# Patient Record
Sex: FEMALE | Race: BLACK OR AFRICAN AMERICAN | NOT HISPANIC OR LATINO | Employment: FULL TIME | URBAN - METROPOLITAN AREA
[De-identification: names, ages, dates, MRNs, and addresses within clinical notes are randomized per-mention and may not be internally consistent; named-entity substitution may affect disease eponyms.]

---

## 2023-04-25 DIAGNOSIS — I10 HYPERTENSION, UNSPECIFIED TYPE: Primary | ICD-10-CM

## 2023-04-26 RX ORDER — LOSARTAN POTASSIUM AND HYDROCHLOROTHIAZIDE 12.5; 5 MG/1; MG/1
TABLET ORAL
Qty: 90 TABLET | Refills: 1 | Status: SHIPPED | OUTPATIENT
Start: 2023-04-26 | End: 2023-07-07 | Stop reason: ALTCHOICE

## 2023-07-07 ENCOUNTER — OFFICE VISIT (OUTPATIENT)
Dept: PRIMARY CARE | Facility: CLINIC | Age: 53
End: 2023-07-07
Payer: COMMERCIAL

## 2023-07-07 VITALS
HEIGHT: 62 IN | DIASTOLIC BLOOD PRESSURE: 84 MMHG | BODY MASS INDEX: 25.24 KG/M2 | HEART RATE: 84 BPM | OXYGEN SATURATION: 95 % | TEMPERATURE: 97.1 F | SYSTOLIC BLOOD PRESSURE: 128 MMHG

## 2023-07-07 DIAGNOSIS — F10.20 ALCOHOLISM (MULTI): Primary | ICD-10-CM

## 2023-07-07 DIAGNOSIS — F41.9 ANXIETY AND DEPRESSION: ICD-10-CM

## 2023-07-07 DIAGNOSIS — Z09 HOSPITAL DISCHARGE FOLLOW-UP: ICD-10-CM

## 2023-07-07 DIAGNOSIS — F32.A ANXIETY AND DEPRESSION: ICD-10-CM

## 2023-07-07 PROBLEM — Z98.51 HISTORY OF BILATERAL TUBAL LIGATION: Status: ACTIVE | Noted: 2018-01-03

## 2023-07-07 PROBLEM — I49.3 PVC'S (PREMATURE VENTRICULAR CONTRACTIONS): Status: ACTIVE | Noted: 2023-07-07

## 2023-07-07 PROBLEM — A59.9 TRICHOMONAS VAGINALIS INFECTION: Status: ACTIVE | Noted: 2023-07-07

## 2023-07-07 PROBLEM — F10.10 ALCOHOL ABUSE: Status: ACTIVE | Noted: 2018-01-03

## 2023-07-07 PROBLEM — R55 SYNCOPE: Status: ACTIVE | Noted: 2023-07-07

## 2023-07-07 PROBLEM — L57.4 LAXITY OF SKIN: Status: ACTIVE | Noted: 2022-05-05

## 2023-07-07 PROBLEM — H93.11 TINNITUS, RIGHT EAR: Status: ACTIVE | Noted: 2023-07-07

## 2023-07-07 PROBLEM — N93.9 ABNORMAL UTERINE BLEEDING (AUB): Status: ACTIVE | Noted: 2023-07-07

## 2023-07-07 PROBLEM — E66.9 OBESITY: Status: ACTIVE | Noted: 2023-07-07

## 2023-07-07 PROBLEM — F17.200 SMOKING: Status: ACTIVE | Noted: 2023-07-07

## 2023-07-07 PROBLEM — H90.6 MIXED CONDUCTIVE AND SENSORINEURAL HEARING LOSS OF BOTH EARS: Status: ACTIVE | Noted: 2023-07-07

## 2023-07-07 PROBLEM — R00.0 TACHYCARDIA: Status: ACTIVE | Noted: 2023-07-07

## 2023-07-07 PROBLEM — I47.10 PAROXYSMAL SVT (SUPRAVENTRICULAR TACHYCARDIA) (CMS-HCC): Status: ACTIVE | Noted: 2023-07-07

## 2023-07-07 PROBLEM — S89.91XA INJURY OF RIGHT KNEE: Status: ACTIVE | Noted: 2023-07-07

## 2023-07-07 PROBLEM — R19.8 LAXITY OF ABDOMINAL WALL: Status: ACTIVE | Noted: 2022-05-05

## 2023-07-07 PROBLEM — S82.831A: Status: ACTIVE | Noted: 2023-07-07

## 2023-07-07 PROBLEM — R42 DIZZINESS: Status: ACTIVE | Noted: 2023-07-07

## 2023-07-07 PROBLEM — L98.7 EXCESS SKIN OF ABDOMEN: Status: ACTIVE | Noted: 2022-05-04

## 2023-07-07 PROBLEM — T88.7XXA MEDICATION SIDE EFFECT: Status: ACTIVE | Noted: 2023-07-07

## 2023-07-07 PROBLEM — H91.90 HEARING LOSS: Status: ACTIVE | Noted: 2023-07-07

## 2023-07-07 PROBLEM — I10 BENIGN ESSENTIAL HYPERTENSION: Status: ACTIVE | Noted: 2018-01-03

## 2023-07-07 PROCEDURE — 3079F DIAST BP 80-89 MM HG: CPT | Performed by: INTERNAL MEDICINE

## 2023-07-07 PROCEDURE — 1036F TOBACCO NON-USER: CPT | Performed by: INTERNAL MEDICINE

## 2023-07-07 PROCEDURE — 3074F SYST BP LT 130 MM HG: CPT | Performed by: INTERNAL MEDICINE

## 2023-07-07 PROCEDURE — 99215 OFFICE O/P EST HI 40 MIN: CPT | Performed by: INTERNAL MEDICINE

## 2023-07-07 RX ORDER — QUETIAPINE FUMARATE 25 MG/1
TABLET, FILM COATED ORAL
COMMUNITY
Start: 2023-06-24 | End: 2024-03-18 | Stop reason: ALTCHOICE

## 2023-07-07 RX ORDER — LOSARTAN POTASSIUM 50 MG/1
TABLET ORAL
COMMUNITY
Start: 2023-07-01

## 2023-07-07 RX ORDER — SERTRALINE HYDROCHLORIDE 50 MG/1
TABLET, FILM COATED ORAL
COMMUNITY
Start: 2023-06-28 | End: 2024-03-18 | Stop reason: ALTCHOICE

## 2023-07-07 RX ORDER — FOLIC ACID 1 MG/1
TABLET ORAL
COMMUNITY
Start: 2023-07-01

## 2023-07-07 RX ORDER — METOPROLOL SUCCINATE 100 MG/1
1 TABLET, EXTENDED RELEASE ORAL DAILY
COMMUNITY
Start: 2020-08-28 | End: 2023-12-29

## 2023-07-07 RX ORDER — HYDROCHLOROTHIAZIDE 12.5 MG/1
TABLET ORAL
COMMUNITY
Start: 2023-06-28

## 2023-07-07 RX ORDER — TRAZODONE HYDROCHLORIDE 50 MG/1
TABLET ORAL
COMMUNITY
Start: 2023-07-05

## 2023-07-07 RX ORDER — HYDROXYZINE HYDROCHLORIDE 50 MG/1
TABLET, FILM COATED ORAL
COMMUNITY
Start: 2023-05-05

## 2023-07-07 RX ORDER — NALTREXONE HYDROCHLORIDE 50 MG/1
TABLET, FILM COATED ORAL
COMMUNITY
Start: 2023-06-28 | End: 2024-06-04 | Stop reason: WASHOUT

## 2023-07-07 ASSESSMENT — PATIENT HEALTH QUESTIONNAIRE - PHQ9
10. IF YOU CHECKED OFF ANY PROBLEMS, HOW DIFFICULT HAVE THESE PROBLEMS MADE IT FOR YOU TO DO YOUR WORK, TAKE CARE OF THINGS AT HOME, OR GET ALONG WITH OTHER PEOPLE: SOMEWHAT DIFFICULT
9. THOUGHTS THAT YOU WOULD BE BETTER OFF DEAD, OR OF HURTING YOURSELF: NOT AT ALL
2. FEELING DOWN, DEPRESSED OR HOPELESS: SEVERAL DAYS
SUM OF ALL RESPONSES TO PHQ9 QUESTIONS 1 & 2: 0
SUM OF ALL RESPONSES TO PHQ9 QUESTIONS 1 AND 2: 2
8. MOVING OR SPEAKING SO SLOWLY THAT OTHER PEOPLE COULD HAVE NOTICED. OR THE OPPOSITE, BEING SO FIGETY OR RESTLESS THAT YOU HAVE BEEN MOVING AROUND A LOT MORE THAN USUAL: NOT AT ALL
7. TROUBLE CONCENTRATING ON THINGS, SUCH AS READING THE NEWSPAPER OR WATCHING TELEVISION: SEVERAL DAYS
1. LITTLE INTEREST OR PLEASURE IN DOING THINGS: NOT AT ALL
6. FEELING BAD ABOUT YOURSELF - OR THAT YOU ARE A FAILURE OR HAVE LET YOURSELF OR YOUR FAMILY DOWN: NOT AT ALL
SUM OF ALL RESPONSES TO PHQ QUESTIONS 1-9: 9
4. FEELING TIRED OR HAVING LITTLE ENERGY: SEVERAL DAYS
3. TROUBLE FALLING OR STAYING ASLEEP OR SLEEPING TOO MUCH: NEARLY EVERY DAY
2. FEELING DOWN, DEPRESSED OR HOPELESS: NOT AT ALL
10. IF YOU CHECKED OFF ANY PROBLEMS, HOW DIFFICULT HAVE THESE PROBLEMS MADE IT FOR YOU TO DO YOUR WORK, TAKE CARE OF THINGS AT HOME, OR GET ALONG WITH OTHER PEOPLE: SOMEWHAT DIFFICULT
1. LITTLE INTEREST OR PLEASURE IN DOING THINGS: SEVERAL DAYS
2. FEELING DOWN, DEPRESSED OR HOPELESS: SEVERAL DAYS
SUM OF ALL RESPONSES TO PHQ9 QUESTIONS 1 AND 2: 2
1. LITTLE INTEREST OR PLEASURE IN DOING THINGS: SEVERAL DAYS
5. POOR APPETITE OR OVEREATING: MORE THAN HALF THE DAYS

## 2023-07-07 ASSESSMENT — ENCOUNTER SYMPTOMS
NERVOUS/ANXIOUS: 1
SORE THROAT: 0
DIZZINESS: 0
JOINT SWELLING: 0
CHILLS: 0
FEVER: 0
HALLUCINATIONS: 0
SHORTNESS OF BREATH: 0
HEADACHES: 0
ARTHRALGIAS: 1
DYSURIA: 0
CONFUSION: 0
EYES NEGATIVE: 1
NUMBNESS: 0
RHINORRHEA: 0
ABDOMINAL PAIN: 0
DIARRHEA: 0
AGITATION: 0
UNEXPECTED WEIGHT CHANGE: 0
WHEEZING: 0
VOMITING: 0
NAUSEA: 0
COUGH: 0
DYSPHORIC MOOD: 1

## 2023-07-07 ASSESSMENT — ANXIETY QUESTIONNAIRES
6. BECOMING EASILY ANNOYED OR IRRITABLE: MORE THAN HALF THE DAYS
GAD7 TOTAL SCORE: 13
3. WORRYING TOO MUCH ABOUT DIFFERENT THINGS: MORE THAN HALF THE DAYS
7. FEELING AFRAID AS IF SOMETHING AWFUL MIGHT HAPPEN: NEARLY EVERY DAY
1. FEELING NERVOUS, ANXIOUS, OR ON EDGE: MORE THAN HALF THE DAYS
IF YOU CHECKED OFF ANY PROBLEMS ON THIS QUESTIONNAIRE, HOW DIFFICULT HAVE THESE PROBLEMS MADE IT FOR YOU TO DO YOUR WORK, TAKE CARE OF THINGS AT HOME, OR GET ALONG WITH OTHER PEOPLE: SOMEWHAT DIFFICULT
5. BEING SO RESTLESS THAT IT IS HARD TO SIT STILL: SEVERAL DAYS
2. NOT BEING ABLE TO STOP OR CONTROL WORRYING: SEVERAL DAYS
4. TROUBLE RELAXING: MORE THAN HALF THE DAYS

## 2023-07-07 NOTE — PATIENT INSTRUCTIONS
Japonica was seen today for hospital discharge.  Diagnoses and all orders for this visit:  Alcoholism (CMS/HCC) (Primary)  Comments:  Last ETOH intake was 04/30/2023. Rehab inpatient rehab completed 07/05/2023.  Plan: Continue current management  -F/u with psychiatry as scheduled 07/12/2023  Anxiety and depression  Comments:  No suicidal thoughts or plan.  Plan: DAMION-7 score=13. PHQ-9 score =9  Plan: Ciontinue current [place].-  F/U with psychiatry as scheduled 07/12/2023  Hospital discharge follow-up     F/U in 1-2 mths for HTN

## 2023-07-07 NOTE — PROGRESS NOTES
Subjective   Patient ID: Mary Mayers is a 52 y.o. female who presents for HOSPITAL DISCHARGE.  Was admitted to [facility] 05/01/2023 to 05/05/2023 and was transferred to [facility] in [facility] 05/05/2023. She was discharged to home on 07/05/2023 Alcoholism, anxiety and Depression. The patient did not bring any discharge paper work with her today.  She is scheduled to see a Psychiatrist at [facility] psychiatry in [facility] on 07/12/2023.        Review of Systems   Constitutional:  Negative for chills, fever and unexpected weight change.   HENT:  Negative for congestion, rhinorrhea and sore throat.    Eyes: Negative.    Respiratory:  Negative for cough, shortness of breath and wheezing.    Cardiovascular:  Negative for chest pain and leg swelling.   Gastrointestinal:  Negative for abdominal pain, diarrhea, nausea and vomiting.   Genitourinary:  Negative for dysuria.   Musculoskeletal:  Positive for arthralgias (intermittent left ankle pain for several weeks.). Negative for joint swelling.        No known injury.   Skin:  Negative for rash.   Neurological:  Negative for dizziness, syncope, numbness and headaches.   Psychiatric/Behavioral:  Positive for dysphoric mood (PHQ-9 score =9/27). Negative for agitation, confusion, hallucinations and suicidal ideas. The patient is nervous/anxious (DAMION-7 score=13/21).        Objective   Physical Exam  Vitals reviewed.   Constitutional:       General: She is not in acute distress.     Appearance: Normal appearance. She is not ill-appearing.   HENT:      Head: Normocephalic and atraumatic.   Eyes:      General: No scleral icterus.     Extraocular Movements: Extraocular movements intact.      Conjunctiva/sclera: Conjunctivae normal.      Pupils: Pupils are equal, round, and reactive to light.   Cardiovascular:      Rate and Rhythm: Normal rate and regular rhythm.      Heart sounds: Normal heart sounds.   Pulmonary:      Effort: Pulmonary effort is  normal.      Breath sounds: Normal breath sounds.   Abdominal:      General: Bowel sounds are normal.      Palpations: Abdomen is soft.      Tenderness: There is no abdominal tenderness.   Musculoskeletal:         General: No tenderness.      Cervical back: Normal range of motion and neck supple. No tenderness.      Right lower leg: No edema.      Left lower leg: No edema.   Lymphadenopathy:      Cervical: No cervical adenopathy.   Skin:     General: Skin is warm and dry.      Findings: No bruising or erythema.   Neurological:      General: No focal deficit present.      Mental Status: She is alert and oriented to person, place, and time.   Psychiatric:         Behavior: Behavior normal.         Assessment/Plan   Japonica was seen today for hospital discharge.  Diagnoses and all orders for this visit:  Alcoholism (CMS/[facility]) (Primary)  Comments:  Last ETOH intake was 04/30/2023. Rehab inpatient rehab completed 07/05/2023.  Plan: Continue current management  -F/u with psychiatry as scheduled 07/12/2023  Anxiety and depression  Comments:  No suicidal thoughts or plan.  Plan: DAMION-7 score=13. PHQ-9 score =9  Plan: Ciontinue current [place].-  F/U with psychiatry as scheduled 07/12/2023  Hospital discharge follow-up     F/U in 1-2 mths for HTN

## 2023-08-23 DIAGNOSIS — F41.9 ANXIETY: ICD-10-CM

## 2023-08-23 DIAGNOSIS — F10.10 ALCOHOL ABUSE: ICD-10-CM

## 2023-08-23 RX ORDER — NAPROXEN 375 MG/1
375 TABLET ORAL DAILY
Qty: 30 TABLET | Refills: 2 | Status: CANCELLED | OUTPATIENT
Start: 2023-08-23

## 2023-08-23 RX ORDER — NAPROXEN 375 MG/1
375 TABLET ORAL
COMMUNITY
End: 2024-03-18 | Stop reason: ALTCHOICE

## 2023-08-23 RX ORDER — SERTRALINE HYDROCHLORIDE 50 MG/1
TABLET, FILM COATED ORAL
Qty: 30 TABLET | Refills: 2 | Status: CANCELLED | OUTPATIENT
Start: 2023-08-23

## 2023-08-23 RX ORDER — GABAPENTIN 300 MG/1
300 CAPSULE ORAL 3 TIMES DAILY
Qty: 90 CAPSULE | Refills: 1 | Status: CANCELLED | OUTPATIENT
Start: 2023-08-23

## 2023-08-23 RX ORDER — QUETIAPINE FUMARATE 25 MG/1
TABLET, FILM COATED ORAL
Qty: 30 TABLET | Refills: 2 | Status: CANCELLED | OUTPATIENT
Start: 2023-08-23

## 2023-08-23 RX ORDER — GABAPENTIN 300 MG/1
300 CAPSULE ORAL 3 TIMES DAILY
COMMUNITY
End: 2023-08-31 | Stop reason: SDUPTHER

## 2023-08-24 DIAGNOSIS — F41.9 ANXIETY: ICD-10-CM

## 2023-08-24 DIAGNOSIS — F10.10 ALCOHOL ABUSE: ICD-10-CM

## 2023-08-24 NOTE — TELEPHONE ENCOUNTER
Patient requested the following medications to be refilled:   traZODone (Desyrel) 50 mg tablet   sertraline (Zoloft) 50 mg tablet   QUEtiapine (SEROquel) 25 mg tablet   gabapentin (Neurontin) 300 mg capsule     If approved , Please send over to pharmacy listed:  [facility] - [address] [place] [address] - [phone]

## 2023-08-29 RX ORDER — TRAZODONE HYDROCHLORIDE 50 MG/1
TABLET ORAL
Status: CANCELLED | OUTPATIENT
Start: 2023-08-29

## 2023-08-29 RX ORDER — SERTRALINE HYDROCHLORIDE 50 MG/1
TABLET, FILM COATED ORAL
Qty: 90 TABLET | Refills: 0 | Status: CANCELLED | OUTPATIENT
Start: 2023-08-29

## 2023-08-29 RX ORDER — GABAPENTIN 300 MG/1
300 CAPSULE ORAL 3 TIMES DAILY
Qty: 90 CAPSULE | Refills: 2 | Status: CANCELLED | OUTPATIENT
Start: 2023-08-29 | End: 2023-11-27

## 2023-08-31 RX ORDER — SERTRALINE HYDROCHLORIDE 50 MG/1
TABLET, FILM COATED ORAL
Qty: 90 TABLET | Refills: 0 | OUTPATIENT
Start: 2023-08-31

## 2023-08-31 RX ORDER — TRAZODONE HYDROCHLORIDE 50 MG/1
TABLET ORAL
Qty: 90 TABLET | Refills: 0 | OUTPATIENT
Start: 2023-08-31

## 2023-08-31 RX ORDER — GABAPENTIN 300 MG/1
300 CAPSULE ORAL 3 TIMES DAILY
Qty: 270 CAPSULE | Refills: 0 | Status: SHIPPED | OUTPATIENT
Start: 2023-08-31 | End: 2024-05-10

## 2023-08-31 RX ORDER — GABAPENTIN 300 MG/1
300 CAPSULE ORAL 3 TIMES DAILY
OUTPATIENT
Start: 2023-08-31

## 2023-08-31 RX ORDER — QUETIAPINE FUMARATE 25 MG/1
TABLET, FILM COATED ORAL
Qty: 90 TABLET | Refills: 0 | OUTPATIENT
Start: 2023-08-31

## 2023-08-31 RX ORDER — NALTREXONE HYDROCHLORIDE 50 MG/1
TABLET, FILM COATED ORAL
Qty: 90 TABLET | Refills: 0 | OUTPATIENT
Start: 2023-08-31

## 2023-09-07 ENCOUNTER — APPOINTMENT (OUTPATIENT)
Dept: PRIMARY CARE | Facility: CLINIC | Age: 53
End: 2023-09-07
Payer: COMMERCIAL

## 2023-09-13 ENCOUNTER — APPOINTMENT (OUTPATIENT)
Dept: PRIMARY CARE | Facility: CLINIC | Age: 53
End: 2023-09-13
Payer: COMMERCIAL

## 2024-01-18 ENCOUNTER — APPOINTMENT (OUTPATIENT)
Dept: OBSTETRICS AND GYNECOLOGY | Facility: CLINIC | Age: 54
End: 2024-01-18
Payer: COMMERCIAL

## 2024-01-23 ASSESSMENT — DERMATOLOGY QUALITY OF LIFE (QOL) ASSESSMENT
RATE HOW EMOTIONALLY BOTHERED YOU ARE BY YOUR SKIN PROBLEM (FOR EXAMPLE, WORRY, EMBARRASSMENT, FRUSTRATION): 0 - NEVER BOTHERED
RATE HOW BOTHERED YOU ARE BY EFFECTS OF YOUR SKIN PROBLEMS ON YOUR ACTIVITIES (EG, GOING OUT, ACCOMPLISHING WHAT YOU WANT, WORK ACTIVITIES OR YOUR RELATIONSHIPS WITH OTHERS): 0 - NEVER BOTHERED
RATE HOW EMOTIONALLY BOTHERED YOU ARE BY YOUR SKIN PROBLEM (FOR EXAMPLE, WORRY, EMBARRASSMENT, FRUSTRATION): 0 - NEVER BOTHERED
RATE HOW BOTHERED YOU ARE BY SYMPTOMS OF YOUR SKIN PROBLEM (EG, ITCHING, STINGING BURNING, HURTING OR SKIN IRRITATION): 6 - ALWAYS BOTHERED
RATE HOW BOTHERED YOU ARE BY SYMPTOMS OF YOUR SKIN PROBLEM (EG, ITCHING, STINGING BURNING, HURTING OR SKIN IRRITATION): 6 - ALWAYS BOTHERED
RATE HOW BOTHERED YOU ARE BY EFFECTS OF YOUR SKIN PROBLEMS ON YOUR ACTIVITIES (EG, GOING OUT, ACCOMPLISHING WHAT YOU WANT, WORK ACTIVITIES OR YOUR RELATIONSHIPS WITH OTHERS): 0 - NEVER BOTHERED

## 2024-01-23 ASSESSMENT — PATIENT GLOBAL ASSESSMENT (PGA): WHAT IS THE PGA: PATIENT GLOBAL ASSESSMENT:  4 - SEVERE

## 2024-01-25 ENCOUNTER — OFFICE VISIT (OUTPATIENT)
Dept: DERMATOLOGY | Facility: CLINIC | Age: 54
End: 2024-01-25
Payer: COMMERCIAL

## 2024-01-25 DIAGNOSIS — L20.89 OTHER ATOPIC DERMATITIS: Primary | ICD-10-CM

## 2024-01-25 PROCEDURE — 96372 THER/PROPH/DIAG INJ SC/IM: CPT

## 2024-01-25 PROCEDURE — 1036F TOBACCO NON-USER: CPT

## 2024-01-25 PROCEDURE — 99203 OFFICE O/P NEW LOW 30 MIN: CPT

## 2024-01-25 RX ORDER — TRIAMCINOLONE ACETONIDE 40 MG/ML
60 INJECTION, SUSPENSION INTRA-ARTICULAR; INTRAMUSCULAR ONCE
Status: COMPLETED | OUTPATIENT
Start: 2024-01-25 | End: 2024-01-25

## 2024-01-25 RX ORDER — BETAMETHASONE DIPROPIONATE 0.5 MG/G
CREAM TOPICAL DAILY
Qty: 50 G | Refills: 0 | Status: SHIPPED | OUTPATIENT
Start: 2024-01-25 | End: 2024-02-24

## 2024-01-25 RX ADMIN — TRIAMCINOLONE ACETONIDE 60 MG: 40 INJECTION, SUSPENSION INTRA-ARTICULAR; INTRAMUSCULAR at 11:37

## 2024-03-18 ENCOUNTER — LAB (OUTPATIENT)
Dept: LAB | Facility: LAB | Age: 54
End: 2024-03-18
Payer: COMMERCIAL

## 2024-03-18 ENCOUNTER — OFFICE VISIT (OUTPATIENT)
Dept: PRIMARY CARE | Facility: CLINIC | Age: 54
End: 2024-03-18
Payer: COMMERCIAL

## 2024-03-18 VITALS
DIASTOLIC BLOOD PRESSURE: 62 MMHG | TEMPERATURE: 97.6 F | BODY MASS INDEX: 31.15 KG/M2 | RESPIRATION RATE: 16 BRPM | HEART RATE: 104 BPM | HEIGHT: 62 IN | OXYGEN SATURATION: 97 % | SYSTOLIC BLOOD PRESSURE: 124 MMHG | WEIGHT: 169.3 LBS

## 2024-03-18 DIAGNOSIS — F41.9 ANXIETY: ICD-10-CM

## 2024-03-18 DIAGNOSIS — E66.9 CLASS 1 OBESITY WITH SERIOUS COMORBIDITY AND BODY MASS INDEX (BMI) OF 30.0 TO 30.9 IN ADULT, UNSPECIFIED OBESITY TYPE: ICD-10-CM

## 2024-03-18 DIAGNOSIS — I47.10 PAROXYSMAL SVT (SUPRAVENTRICULAR TACHYCARDIA) (CMS-HCC): ICD-10-CM

## 2024-03-18 DIAGNOSIS — I10 BENIGN ESSENTIAL HYPERTENSION: Primary | ICD-10-CM

## 2024-03-18 DIAGNOSIS — F10.20 ALCOHOLISM (MULTI): ICD-10-CM

## 2024-03-18 PROBLEM — F17.200 SMOKING: Status: RESOLVED | Noted: 2023-07-07 | Resolved: 2024-03-18

## 2024-03-18 LAB
ANION GAP SERPL CALC-SCNC: 16 MMOL/L (ref 10–20)
BUN SERPL-MCNC: 21 MG/DL (ref 6–23)
CALCIUM SERPL-MCNC: 10 MG/DL (ref 8.6–10.6)
CHLORIDE SERPL-SCNC: 102 MMOL/L (ref 98–107)
CHOLEST SERPL-MCNC: 266 MG/DL (ref 0–199)
CHOLESTEROL/HDL RATIO: 2.4
CO2 SERPL-SCNC: 27 MMOL/L (ref 21–32)
CREAT SERPL-MCNC: 1.39 MG/DL (ref 0.5–1.05)
EGFRCR SERPLBLD CKD-EPI 2021: 45 ML/MIN/1.73M*2
EST. AVERAGE GLUCOSE BLD GHB EST-MCNC: 105 MG/DL
GLUCOSE SERPL-MCNC: 118 MG/DL (ref 74–99)
HBA1C MFR BLD: 5.3 %
HDLC SERPL-MCNC: 112.9 MG/DL
NON-HDL CHOLESTEROL: 153 MG/DL (ref 0–149)
POTASSIUM SERPL-SCNC: 4.4 MMOL/L (ref 3.5–5.3)
SODIUM SERPL-SCNC: 141 MMOL/L (ref 136–145)

## 2024-03-18 PROCEDURE — 3078F DIAST BP <80 MM HG: CPT | Performed by: STUDENT IN AN ORGANIZED HEALTH CARE EDUCATION/TRAINING PROGRAM

## 2024-03-18 PROCEDURE — 83036 HEMOGLOBIN GLYCOSYLATED A1C: CPT

## 2024-03-18 PROCEDURE — 36415 COLL VENOUS BLD VENIPUNCTURE: CPT

## 2024-03-18 PROCEDURE — 3074F SYST BP LT 130 MM HG: CPT | Performed by: STUDENT IN AN ORGANIZED HEALTH CARE EDUCATION/TRAINING PROGRAM

## 2024-03-18 PROCEDURE — 3008F BODY MASS INDEX DOCD: CPT | Performed by: STUDENT IN AN ORGANIZED HEALTH CARE EDUCATION/TRAINING PROGRAM

## 2024-03-18 PROCEDURE — 82465 ASSAY BLD/SERUM CHOLESTEROL: CPT

## 2024-03-18 PROCEDURE — 83718 ASSAY OF LIPOPROTEIN: CPT

## 2024-03-18 PROCEDURE — 80048 BASIC METABOLIC PNL TOTAL CA: CPT

## 2024-03-18 PROCEDURE — 99214 OFFICE O/P EST MOD 30 MIN: CPT | Performed by: STUDENT IN AN ORGANIZED HEALTH CARE EDUCATION/TRAINING PROGRAM

## 2024-03-18 RX ORDER — TOPIRAMATE 25 MG/1
TABLET ORAL
Qty: 282 TABLET | Refills: 1 | Status: SHIPPED | OUTPATIENT
Start: 2024-03-18 | End: 2024-06-07

## 2024-03-18 ASSESSMENT — PATIENT HEALTH QUESTIONNAIRE - PHQ9
1. LITTLE INTEREST OR PLEASURE IN DOING THINGS: NOT AT ALL
2. FEELING DOWN, DEPRESSED OR HOPELESS: SEVERAL DAYS
SUM OF ALL RESPONSES TO PHQ9 QUESTIONS 1 & 2: 1
10. IF YOU CHECKED OFF ANY PROBLEMS, HOW DIFFICULT HAVE THESE PROBLEMS MADE IT FOR YOU TO DO YOUR WORK, TAKE CARE OF THINGS AT HOME, OR GET ALONG WITH OTHER PEOPLE: NOT DIFFICULT AT ALL

## 2024-03-18 ASSESSMENT — LIFESTYLE VARIABLES
HOW MANY STANDARD DRINKS CONTAINING ALCOHOL DO YOU HAVE ON A TYPICAL DAY: 3 OR 4
SKIP TO QUESTIONS 9-10: 0
HOW OFTEN DO YOU HAVE A DRINK CONTAINING ALCOHOL: MONTHLY OR LESS
AUDIT-C TOTAL SCORE: 2
HOW OFTEN DO YOU HAVE SIX OR MORE DRINKS ON ONE OCCASION: NEVER

## 2024-03-18 NOTE — ASSESSMENT & PLAN NOTE
He drinks alcohol daily, advised on cutting down on alcohol consumption, due for repeat blood work, follow-up with psychiatry

## 2024-03-18 NOTE — PROGRESS NOTES
Subjective   Patient ID: Mary Mayers is a pleasant 53 y.o. female w hx of anxiety who presents for Establish Care.  HPI  Patient is here to follow-up.  She has history of anxiety currently taking hydroxyzine.  Not taking any other medications.  Feels that her anxiety is not well-controlled.  She is currently not seeing a psychiatrist.  She was previously following up with psychiatry outside  at [facility] psychiatry.  She is also interested in weight loss medications.  No prior diagnosis of diabetes.  We discussed about starting topiramate which she is agreeable with.  She is also counseled on lowering caloric intake and increasing physical activity.    Review of Systems   All other systems reviewed and are negative.      Visit Vitals  /62 (BP Location: Left arm, Patient Position: Sitting, BP Cuff Size: Adult)   Pulse 104   Temp 36.4 °C (97.6 °F)   Resp 16          Objective   Physical Exam  Constitutional:       General: She is not in acute distress.     Appearance: Normal appearance.   HENT:      Head: Normocephalic and atraumatic.   Eyes:      General: No scleral icterus.     Conjunctiva/sclera: Conjunctivae normal.   Cardiovascular:      Rate and Rhythm: Normal rate and regular rhythm.      Heart sounds: Normal heart sounds.   Pulmonary:      Effort: Pulmonary effort is normal.      Breath sounds: Normal breath sounds. No wheezing.   Abdominal:      General: Bowel sounds are normal. There is no distension.      Palpations: Abdomen is soft.      Tenderness: There is no abdominal tenderness.   Musculoskeletal:      Cervical back: Neck supple.      Right lower leg: No edema.      Left lower leg: No edema.   Lymphadenopathy:      Cervical: No cervical adenopathy.   Skin:     General: Skin is warm and dry.   Neurological:      General: No focal deficit present.      Mental Status: She is alert and oriented to person, place, and time.   Psychiatric:         Mood and Affect: Mood normal.         Behavior: Behavior  normal.         Assessment/Plan   Problem List Items Addressed This Visit       Anxiety    Relevant Orders    Referral to Psychiatry    Benign essential hypertension - Primary    Paroxysmal SVT (supraventricular tachycardia)    Obesity    Relevant Medications    topiramate (Topamax) 25 mg tablet    Other Relevant Orders    Hemoglobin A1C    Lipid Panel Non-Fasting    Basic metabolic panel    Alcoholism (CMS/[facility])     He drinks alcohol daily, advised on cutting down on alcohol consumption, due for repeat blood work, follow-up with psychiatry

## 2024-03-18 NOTE — PATIENT INSTRUCTIONS
Sent Topiramate 25mg to your pharmacy:  - Use 1 tablet (25mg) at night X 1 wk  - Then use 2 tablet (50mg) at night X 1 wk  - Then use 3 tablet (75mg) at night X 1 wk  - Then use 4 tablet (100mg) at night there after.   Side effects for topiramate include:  - Drowsiness or dizziness  - Memory or thinking trouble  - Tingling around the mouth and fingertips (usually temporary)  - A rare form of glaucoma (increased eye pressure) (extremely uncommon)  - Weight loss  - Nausea, vomiting  - Kidney stones  - Interaction with birth control pills and other hormones making them less effective  - increased risk of birth defects if you become pregnant (cleft lip and palate)  Follow up in 3-4 months or as needed

## 2024-03-19 DIAGNOSIS — N17.9 AKI (ACUTE KIDNEY INJURY) (CMS-HCC): Primary | ICD-10-CM

## 2024-03-28 ENCOUNTER — LAB (OUTPATIENT)
Dept: LAB | Facility: LAB | Age: 54
End: 2024-03-28
Payer: COMMERCIAL

## 2024-03-28 ENCOUNTER — OFFICE VISIT (OUTPATIENT)
Dept: OBSTETRICS AND GYNECOLOGY | Facility: CLINIC | Age: 54
End: 2024-03-28
Payer: COMMERCIAL

## 2024-03-28 VITALS
BODY MASS INDEX: 28.89 KG/M2 | WEIGHT: 169.2 LBS | DIASTOLIC BLOOD PRESSURE: 80 MMHG | HEIGHT: 64 IN | SYSTOLIC BLOOD PRESSURE: 122 MMHG

## 2024-03-28 DIAGNOSIS — N95.1 SYMPTOMS, SUCH AS FLUSHING, SLEEPLESSNESS, HEADACHE, LACK OF CONCENTRATION, ASSOCIATED WITH THE MENOPAUSE: Primary | ICD-10-CM

## 2024-03-28 DIAGNOSIS — R68.82 DECREASED LIBIDO: ICD-10-CM

## 2024-03-28 DIAGNOSIS — N95.1 SYMPTOMS, SUCH AS FLUSHING, SLEEPLESSNESS, HEADACHE, LACK OF CONCENTRATION, ASSOCIATED WITH THE MENOPAUSE: ICD-10-CM

## 2024-03-28 LAB
ALBUMIN SERPL BCP-MCNC: 4.2 G/DL (ref 3.4–5)
ALP SERPL-CCNC: 72 U/L (ref 33–110)
ALT SERPL W P-5'-P-CCNC: 22 U/L (ref 7–45)
ANION GAP SERPL CALC-SCNC: 15 MMOL/L (ref 10–20)
AST SERPL W P-5'-P-CCNC: 22 U/L (ref 9–39)
BILIRUB SERPL-MCNC: 0.5 MG/DL (ref 0–1.2)
BUN SERPL-MCNC: 18 MG/DL (ref 6–23)
CALCIUM SERPL-MCNC: 10.4 MG/DL (ref 8.6–10.6)
CHLORIDE SERPL-SCNC: 100 MMOL/L (ref 98–107)
CO2 SERPL-SCNC: 29 MMOL/L (ref 21–32)
CREAT SERPL-MCNC: 0.96 MG/DL (ref 0.5–1.05)
EGFRCR SERPLBLD CKD-EPI 2021: 71 ML/MIN/1.73M*2
FSH SERPL-ACNC: 117.3 IU/L
GLUCOSE SERPL-MCNC: 85 MG/DL (ref 74–99)
POTASSIUM SERPL-SCNC: 4.2 MMOL/L (ref 3.5–5.3)
PROT SERPL-MCNC: 7.5 G/DL (ref 6.4–8.2)
SODIUM SERPL-SCNC: 140 MMOL/L (ref 136–145)
TSH SERPL-ACNC: 0.69 MIU/L (ref 0.44–3.98)

## 2024-03-28 PROCEDURE — 83001 ASSAY OF GONADOTROPIN (FSH): CPT

## 2024-03-28 PROCEDURE — 3008F BODY MASS INDEX DOCD: CPT | Performed by: OBSTETRICS & GYNECOLOGY

## 2024-03-28 PROCEDURE — 3074F SYST BP LT 130 MM HG: CPT | Performed by: OBSTETRICS & GYNECOLOGY

## 2024-03-28 PROCEDURE — 36415 COLL VENOUS BLD VENIPUNCTURE: CPT

## 2024-03-28 PROCEDURE — 3079F DIAST BP 80-89 MM HG: CPT | Performed by: OBSTETRICS & GYNECOLOGY

## 2024-03-28 PROCEDURE — 99204 OFFICE O/P NEW MOD 45 MIN: CPT | Performed by: OBSTETRICS & GYNECOLOGY

## 2024-03-28 PROCEDURE — 80053 COMPREHEN METABOLIC PANEL: CPT

## 2024-03-28 PROCEDURE — 84443 ASSAY THYROID STIM HORMONE: CPT

## 2024-03-28 ASSESSMENT — PAIN SCALES - GENERAL: PAINLEVEL: 0-NO PAIN

## 2024-03-28 NOTE — PROGRESS NOTES
53 year old  presents with menopause symptoms including hot flashes, night sweats, vaginal dryness and decreased libido over the last 6 to 12 months.  She tried Estroven/over-the-counter meds without relief.  She reports occasional discomfort with sexual intercourse.    She has been amenorrheic since age 40-year-old but states that over the last year she has noticed increased menopausal symptoms.  She reports a lot of stress.  She states that she did some rehab a year ago and plans to restart seeing a therapist to assist with decreasing her 5 glasses of wine per day use.  She reports that she recently had to place her father and hospice after a CVA.    She is without suicidal homicidal ideation.    GynHx: Menarche began at age 14.  She is in postmenopausal since age 42.  She has remote history of chlamydia.  She denies any abnormal Pap smears or sexual abuse.  She did not receive the HPV vaccine.  She is sexually active with 1 male partner.    OBHx:  x3. EAB x1.     O: WDWN woman in NAD, A&O x3    A/P: Menoapause sx    -  FSH, TSH     -  CMP (increase EtOH)    -  [facility] referral     -  RTC for F/U     -  Info given

## 2024-03-29 ENCOUNTER — TELEPHONE (OUTPATIENT)
Dept: OBSTETRICS AND GYNECOLOGY | Facility: CLINIC | Age: 54
End: 2024-03-29
Payer: COMMERCIAL

## 2024-03-29 NOTE — TELEPHONE ENCOUNTER
Contacted pt and verified name and .  Pt notified of her test results and to follow up with Dr Chu [phone].  Pt also advised to RTC for menopause symptoms if neccessary.  Pt states understanding and denies having questions at this time.

## 2024-03-29 NOTE — TELEPHONE ENCOUNTER
----- Message from Kathy Argueta MD sent at 3/28/2024 10:13 PM EDT -----  FSH c/w menopause. Normal CMP. F/U with Vlad and RTC for menopause sx follow up.

## 2024-04-16 ENCOUNTER — TELEMEDICINE (OUTPATIENT)
Dept: BEHAVIORAL HEALTH | Facility: CLINIC | Age: 54
End: 2024-04-16
Payer: COMMERCIAL

## 2024-04-16 DIAGNOSIS — F41.9 ANXIETY: ICD-10-CM

## 2024-04-25 ENCOUNTER — APPOINTMENT (OUTPATIENT)
Dept: OBSTETRICS AND GYNECOLOGY | Facility: CLINIC | Age: 54
End: 2024-04-25
Payer: COMMERCIAL

## 2024-06-04 ENCOUNTER — TELEMEDICINE (OUTPATIENT)
Dept: BEHAVIORAL HEALTH | Facility: CLINIC | Age: 54
End: 2024-06-04
Payer: COMMERCIAL

## 2024-06-04 DIAGNOSIS — F10.20 ALCOHOL USE DISORDER, SEVERE (MULTI): ICD-10-CM

## 2024-06-04 DIAGNOSIS — F51.01 PRIMARY INSOMNIA: ICD-10-CM

## 2024-06-04 DIAGNOSIS — F41.1 GAD (GENERALIZED ANXIETY DISORDER): ICD-10-CM

## 2024-06-04 DIAGNOSIS — F33.0 MILD EPISODE OF RECURRENT MAJOR DEPRESSIVE DISORDER (CMS-HCC): ICD-10-CM

## 2024-06-04 PROCEDURE — 3008F BODY MASS INDEX DOCD: CPT | Performed by: REGISTERED NURSE

## 2024-06-04 PROCEDURE — 99205 OFFICE O/P NEW HI 60 MIN: CPT | Performed by: REGISTERED NURSE

## 2024-06-04 ASSESSMENT — PATIENT HEALTH QUESTIONNAIRE - PHQ9
10. IF YOU CHECKED OFF ANY PROBLEMS, HOW DIFFICULT HAVE THESE PROBLEMS MADE IT FOR YOU TO DO YOUR WORK, TAKE CARE OF THINGS AT HOME, OR GET ALONG WITH OTHER PEOPLE: SOMEWHAT DIFFICULT
1. LITTLE INTEREST OR PLEASURE IN DOING THINGS: SEVERAL DAYS
2. FEELING DOWN, DEPRESSED OR HOPELESS: SEVERAL DAYS
SUM OF ALL RESPONSES TO PHQ9 QUESTIONS 1 & 2: 2

## 2024-06-04 ASSESSMENT — ANXIETY QUESTIONNAIRES
2. NOT BEING ABLE TO STOP OR CONTROL WORRYING: NOT AT ALL
3. WORRYING TOO MUCH ABOUT DIFFERENT THINGS: SEVERAL DAYS
IF YOU CHECKED OFF ANY PROBLEMS ON THIS QUESTIONNAIRE, HOW DIFFICULT HAVE THESE PROBLEMS MADE IT FOR YOU TO DO YOUR WORK, TAKE CARE OF THINGS AT HOME, OR GET ALONG WITH OTHER PEOPLE: SOMEWHAT DIFFICULT
GAD7 TOTAL SCORE: 10
1. FEELING NERVOUS, ANXIOUS, OR ON EDGE: SEVERAL DAYS
6. BECOMING EASILY ANNOYED OR IRRITABLE: NEARLY EVERY DAY
4. TROUBLE RELAXING: MORE THAN HALF THE DAYS
7. FEELING AFRAID AS IF SOMETHING AWFUL MIGHT HAPPEN: NEARLY EVERY DAY
5. BEING SO RESTLESS THAT IT IS HARD TO SIT STILL: NOT AT ALL
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
## 2024-06-12 ENCOUNTER — APPOINTMENT (OUTPATIENT)
Dept: BEHAVIORAL HEALTH | Facility: CLINIC | Age: 54
End: 2024-06-12
Payer: COMMERCIAL

## 2024-06-12 VITALS — HEART RATE: 84 BPM | DIASTOLIC BLOOD PRESSURE: 77 MMHG | SYSTOLIC BLOOD PRESSURE: 176 MMHG

## 2024-06-12 DIAGNOSIS — F10.20 ALCOHOL USE DISORDER, SEVERE (MULTI): Primary | ICD-10-CM

## 2024-06-12 LAB
AMPHETAMINES UR QL SCN: NORMAL
BARBITURATES UR QL SCN: NORMAL
BENZODIAZ UR QL SCN: NORMAL
BZE UR QL SCN: NORMAL
CANNABINOIDS UR QL SCN: NORMAL
FENTANYL+NORFENTANYL UR QL SCN: NORMAL
METHADONE UR QL SCN: NORMAL
OPIATES UR QL SCN: NORMAL
OXYCODONE+OXYMORPHONE UR QL SCN: NORMAL
PCP UR QL SCN: NORMAL

## 2024-06-12 PROCEDURE — 80346 BENZODIAZEPINES1-12: CPT | Performed by: NURSE PRACTITIONER

## 2024-06-12 PROCEDURE — 3078F DIAST BP <80 MM HG: CPT | Performed by: COUNSELOR

## 2024-06-12 PROCEDURE — 80307 DRUG TEST PRSMV CHEM ANLYZR: CPT | Performed by: NURSE PRACTITIONER

## 2024-06-12 PROCEDURE — 80321 ALCOHOLS BIOMARKERS 1OR 2: CPT | Performed by: NURSE PRACTITIONER

## 2024-06-12 PROCEDURE — 3077F SYST BP >= 140 MM HG: CPT | Performed by: COUNSELOR

## 2024-06-12 PROCEDURE — 80365 DRUG SCREENING OXYCODONE: CPT | Performed by: NURSE PRACTITIONER

## 2024-06-12 PROCEDURE — 90791 PSYCH DIAGNOSTIC EVALUATION: CPT | Performed by: COUNSELOR

## 2024-06-12 PROCEDURE — 3008F BODY MASS INDEX DOCD: CPT | Performed by: COUNSELOR

## 2024-06-12 ASSESSMENT — PAIN SCALES - GENERAL: PAINLEVEL: 0-NO PAIN
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
## 2024-06-13 ENCOUNTER — MULTIDISCIPLINARY VISIT (OUTPATIENT)
Dept: BEHAVIORAL HEALTH | Facility: CLINIC | Age: 54
End: 2024-06-13
Payer: COMMERCIAL

## 2024-06-13 DIAGNOSIS — F10.20 ALCOHOL USE DISORDER, SEVERE (MULTI): ICD-10-CM

## 2024-06-13 PROCEDURE — 90853 GROUP PSYCHOTHERAPY: CPT

## 2024-06-14 ENCOUNTER — DOCUMENTATION (OUTPATIENT)
Dept: BEHAVIORAL HEALTH | Facility: CLINIC | Age: 54
End: 2024-06-14
Payer: COMMERCIAL

## 2024-06-14 LAB — ETHYL GLUCURONIDE UR QL SCN: NORMAL NG/ML

## 2024-06-14 NOTE — GROUP NOTE
Group Topic: Chemical Dependency - Relapse   Group Date: 6/13/2024  Start Time:  7:00 PM  End Time:  8:00 PM  Facilitators: Mesha Crowe LPC; BARRY Deleon   [facility]    Number of Participants: 9   Treatment Modality: Psychodynamic Psychotherapy  Interventions utilized were confrontation, group exercise, orientation, patient education, problem solving, story telling, and support  Purpose: coping skills, feelings, communication skills, insight or knowledge, and relapse prevention strategies  Comment: Group therapy.  Used time to allow group members time to check in and provide any updates.  One group member takes time to process a relapse while another group member takes time to introduce herself to the group as tonight is her first night.     Name: Mary Mayers YOB: 1970   MR: [id number]      Level of Participation: active  Quality of Participation: attentive  Mood/Affect:  Euthymic, tearful at times  Progress: Gaining insight or knowledge  Plan: continue with services  Shares with group circumstance of her admission to [facility].  Recognized that her drinking had progressed past were it had been before she initially sought treatment one year ago. Tearful when talking about keeping her use secret from her significant other.  Seems open to the group process as well as insightful regarding impact of alcohol on her life.

## 2024-06-14 NOTE — CARE PLAN
Problem: D5 Substance free life: Lifestyle which reinforces maintenance of chemical dependency  Goal: STG Patient will abstain from alcohol and all mind altering substances as evidence by negative uds  Outcome: Progressing  Goal: STG Patient will attend scheduled IOP days  Outcome: Progressing  Goal: LTG Patient will establish a structured recovery peer support program during course of treatment program  Outcome: Progressing  Goal: LTG Patient will develop a written plan for continuing treatment post discharge during last scheduled week of treatment  Outcome: Progressing      Level of Care Assessment:  D1: Acute Intx/Withdrawal Potential: 1  D2: Biomedical Conditions/Complications: 1  D3: Emtional Behavioral/Cog. Conditions Complications: 1  D4: Treatment Acceptance/Resistance: 1  D5: Relapse/Cont. Use/Cont. Problem Potential: 1  D6: Recovery Environment: 1    6/14 Jt Client starts IOP on 6/13.  Seems open to the group process.  Is oriented to the program and expectations.

## 2024-06-14 NOTE — GROUP NOTE
Group Topic: Chemical Dependency - Primary Disease   Group Date: 6/13/2024  Start Time:  8:00 PM  End Time:  9:00 PM  Facilitators: BARRY Deleon   Department: [facility]    Number of Participants: 8   Treatment Modality: Psychoeducation  Interventions utilized were patient education  Purpose: communication skills, insight or knowledge, and relapse prevention strategies  Comment: Today's education session focused on personally identifying signs and symptomatology of substance use disorder including a loss of control over drinking or drug use and identifying life areas that have been affected by addiction.  One patient who is transitioning from IOP to after care, spoke to the group about ways that these principles have manifested in their own life.  Patient was engaged and attentive.    Name: Mary Mayers YOB: 1970   MR: [id number]      Level of Participation: when cued  Quality of Participation: appropriate/pleasant, attentive, cooperative, and engaged  Mood/Affect: appropriate  Progress: Gaining insight or knowledge  Plan: continue with services

## 2024-06-14 NOTE — GROUP NOTE
Group Topic: Dialectical Behavioral Therapy - Mindfulness   Group Date: 6/13/2024  Start Time:  6:00 PM  End Time:  7:00 PM  Facilitators: Mesha Crowe LPC; BARRY Deleon   [facility]    Number of Participants: 9   Treatment Modality: Skills Training  Interventions utilized were group exercise  Purpose: maladaptive thinking, feelings, and irrational fears  Comment: Beginning of group was focused on introduction to mindfulness exercise. Discussed rational for the practice of mindfulness as well as brief education about some of myths regarding the goals of mindfulness. Allowed time to process the activity.      Name: Mary Mayers YOB: 1970   MR: [id number]      Level of Participation: active  Quality of Participation: cooperative  Mood/Affect: appropriate  Progress: Gaining insight or knowledge  Plan: continue with services

## 2024-06-17 ENCOUNTER — MULTIDISCIPLINARY VISIT (OUTPATIENT)
Dept: BEHAVIORAL HEALTH | Facility: CLINIC | Age: 54
End: 2024-06-17
Payer: COMMERCIAL

## 2024-06-17 DIAGNOSIS — F10.20 ALCOHOL USE DISORDER, SEVERE (MULTI): Primary | ICD-10-CM

## 2024-06-17 LAB
1OH-MIDAZOLAM UR CFM-MCNC: <25 NG/ML
6MAM UR CFM-MCNC: <25 NG/ML
7AMINOCLONAZEPAM UR CFM-MCNC: <25 NG/ML
A-OH ALPRAZ UR CFM-MCNC: <25 NG/ML
ALPRAZ UR CFM-MCNC: <25 NG/ML
CHLORDIAZEP UR CFM-MCNC: <25 NG/ML
CLONAZEPAM UR CFM-MCNC: <25 NG/ML
CODEINE UR CFM-MCNC: <50 NG/ML
DIAZEPAM UR CFM-MCNC: <25 NG/ML
HYDROCODONE CTO UR CFM-MCNC: <25 NG/ML
HYDROMORPHONE UR CFM-MCNC: <25 NG/ML
LORAZEPAM UR CFM-MCNC: <25 NG/ML
MIDAZOLAM UR CFM-MCNC: <25 NG/ML
MORPHINE UR CFM-MCNC: <50 NG/ML
NORDIAZEPAM UR CFM-MCNC: <25 NG/ML
NORHYDROCODONE UR CFM-MCNC: <25 NG/ML
NOROXYCODONE UR CFM-MCNC: <25 NG/ML
OXAZEPAM UR CFM-MCNC: <25 NG/ML
OXYCODONE UR CFM-MCNC: <25 NG/ML
OXYMORPHONE UR CFM-MCNC: <25 NG/ML
TEMAZEPAM UR CFM-MCNC: <25 NG/ML

## 2024-06-17 PROCEDURE — 90853 GROUP PSYCHOTHERAPY: CPT | Performed by: COUNSELOR

## 2024-06-18 ENCOUNTER — MULTIDISCIPLINARY VISIT (OUTPATIENT)
Dept: BEHAVIORAL HEALTH | Facility: CLINIC | Age: 54
End: 2024-06-18
Payer: COMMERCIAL

## 2024-06-18 DIAGNOSIS — F10.20 ALCOHOL USE DISORDER, SEVERE, DEPENDENCE (MULTI): ICD-10-CM

## 2024-06-18 LAB
ETHYL GLUCURONIDE UR CFM-MCNC: 2325 NG/ML
ETHYL SULFATE UR CFM-MCNC: 3214 NG/ML

## 2024-06-18 PROCEDURE — 90853 GROUP PSYCHOTHERAPY: CPT | Performed by: COUNSELOR

## 2024-06-18 NOTE — GROUP NOTE
Group Topic: Dialectical Behavioral Therapy - Mindfulness   Group Date: 6/17/2024  Start Time:  7:00 PM  End Time:  8:00 PM  Facilitators: Mesha Crowe LPC   [facility]    Number of Participants: 4   Treatment Modality: Skills Training  Interventions utilized were group exercise  Purpose: relapse prevention strategies and trigger / craving management  Comment: Beginning of group was focused on introduction to mindfulness exercise. Discussed rational for the practice of mindfulness as well as brief education about some of myths regarding the goals of mindfulness. Processed reading from 'Mind, body and spirit' which talked about people pleasing behaviors and learning to share openly and honestly.      Name: Mary Mayers YOB: 1970   MR: [id number]      Level of Participation: active  Quality of Participation: cooperative  Mood/Affect: appropriate  Progress: Gaining insight or knowledge  Plan: continue with services

## 2024-06-18 NOTE — GROUP NOTE
Group Topic: Chemical Dependency - Relapse   Group Date: 6/17/2024  Start Time:  6:00 PM  End Time:  7:00 PM  Facilitators: Mesha Crowe LPC   [facility]    Number of Participants: 4   Treatment Modality: Psychoeducation  Interventions utilized were patient education  Purpose: relapse prevention strategies  Comment: Group members worked through material 'Relapse and the Addict' which explores an 8 stage relapse process.  Discussed stages of relapse as well as strategies if group members begin to see signs of relapse.     Name: Mary Mayers YOB: 1970   MR: [id number]      Level of Participation: active  Quality of Participation: cooperative  Mood/Affect: appropriate  Progress: Gaining insight or knowledge  Plan: continue with services

## 2024-06-18 NOTE — GROUP NOTE
Group Topic: Chemical Dependency - Primary Disease   Group Date: 6/17/2024  Start Time:  8:00 PM  End Time:  9:00 PM  Facilitators: Mesha Crowe LPC   [facility]    Number of Participants: 4   Treatment Modality: Psychodynamic Psychotherapy  Interventions utilized were exploration, patient education, reality testing, and support  Purpose: coping skills, feelings, communication skills, insight or knowledge, and self-worth  Comment: Group therapy.  Group begins with reading from meditation book.  One group member takes time to share with group  an article he recently found that encouraged offering compassion to addicted/alcoholic individuals. One group member brings up navigating high risk situations in early recovery.  Good group discussion.       Name: Mary Mayers YOB: 1970   MR: [id number]      Level of Participation: active  Quality of Participation: cooperative  Mood/Affect: appropriate  Progress: Gaining insight or knowledge  Plan: continue with services  Talks about how she navigated high risk situations in the past.  Seems to dismiss the danger of hanging out at bars however admits that seeing people drinking triggers euphoric recall.  Relates well and offers good support to peers.

## 2024-06-19 NOTE — GROUP NOTE
Group Topic: Chemical Dependency - Relapse   Group Date: 6/18/2024  Start Time:  8:00 PM  End Time:  9:00 PM  Facilitators: BARRY Deleon; Mesha Crowe LPC   [facility]    Number of Participants: 4   Treatment Modality: Cognitive Behavioral Therapy and Dialectical Behavioral Therapy  Interventions utilized were exploration, reality testing, and support  Purpose: feelings, communication skills, and trigger / craving management  Comment: Group therapy with members of the [facility] level of treatment.  It was family night.  Started the group with a reading from “Body, Mind, and Spirit.”  The reading talked about the power of forgiveness including forgiving ourselves.  Group members shared what they could relate to from today's reading.  The group then discussed family related recovery issues.      Name: Mary Mayers YOB: 1970   MR: [id number]      Level of Participation: active  Quality of Participation: appropriate/pleasant, attentive, cooperative, and engaged  Mood/Affect: appropriate  Progress: Gaining insight or knowledge.  Patient was active and engaged in tonight's group therapy session.  She shared with the group about her struggle to see how her disease of addiction has negatively impacted other people in her life.  The group provided her with feedback to consider regarding this disclosure.  Patient reports current abstinence.    Plan: continue with services

## 2024-06-19 NOTE — GROUP NOTE
Group Topic: Chemical Dependency - Primary Disease   Group Date: 6/18/2024  Start Time:  6:00 PM  End Time:  7:00 PM  Facilitators: Mesha Crowe LPC   [facility]    Number of Participants: 4   Treatment Modality: Psychoeducation  Interventions utilized were other video and patient education  Purpose: insight or knowledge  Comment: Group members watched 'Family Trap' which explores the roles/behaviors adopted by family members in response to drinking/substance use in the family.  Explored the role of the primary enabler, family hero, scapegoat, lost child, and mascot.  Explored how these roles, while helping children survive chemically dependent families, later become a trap and interfere later in life. Used some time to process information and discuss further dysfunctional family rules including 'no talk, trust, and feel' rules that promote continued dysfunction.      Name: Mary Mayers YOB: 1970   MR: [id number]      Level of Participation: active  Quality of Participation: cooperative  Mood/Affect: appropriate  Progress: Gaining insight or knowledge  Plan: continue with services

## 2024-06-20 ENCOUNTER — MULTIDISCIPLINARY VISIT (OUTPATIENT)
Dept: BEHAVIORAL HEALTH | Facility: CLINIC | Age: 54
End: 2024-06-20
Payer: COMMERCIAL

## 2024-06-20 DIAGNOSIS — F10.20 ALCOHOL USE DISORDER, SEVERE (MULTI): ICD-10-CM

## 2024-06-20 PROCEDURE — 90853 GROUP PSYCHOTHERAPY: CPT

## 2024-06-20 PROCEDURE — 80307 DRUG TEST PRSMV CHEM ANLYZR: CPT

## 2024-06-21 ENCOUNTER — APPOINTMENT (OUTPATIENT)
Dept: BEHAVIORAL HEALTH | Facility: CLINIC | Age: 54
End: 2024-06-21
Payer: COMMERCIAL

## 2024-06-21 ENCOUNTER — DOCUMENTATION (OUTPATIENT)
Dept: BEHAVIORAL HEALTH | Facility: CLINIC | Age: 54
End: 2024-06-21

## 2024-06-21 DIAGNOSIS — F54 PSYCHOLOGICAL AND BEHAVIORAL FACTORS ASSOCIATED WITH DISORDERS OR DISEASES CLASSIFIED ELSEWHERE: ICD-10-CM

## 2024-06-21 DIAGNOSIS — F10.20 ALCOHOLISM (MULTI): Primary | ICD-10-CM

## 2024-06-21 PROCEDURE — 90791 PSYCH DIAGNOSTIC EVALUATION: CPT | Performed by: PSYCHOLOGIST

## 2024-06-21 PROCEDURE — 3008F BODY MASS INDEX DOCD: CPT | Performed by: PSYCHOLOGIST

## 2024-06-21 NOTE — PROGRESS NOTES
Initial Assessment  Start Time 10 am  End Time 10:40 am  Documentation 10   No Falls, Tobacco use, SI    Japonica, 53, is referred by Dr. Argueta for assessment and treatment recommendations of menopause related symptoms.  Of note, Mary is currently in the  IOP after relapsing alcohol and has been in the IOP 2 weeks.  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  She has 3 adult children. She has been with and lives with her partner Brian x 3 years. She is happy in her relationship with him.  Her primary concern is loss of sexual desire with onset last year. However, she reports a relatively early menopause (mid 40s) and no HT.  She currently has fairly severe VMS . She has at least 4 hotflashes during the day and several in the night.  She has WASO (waking after sleep onset) that is consistent with menopause symptoms.  She also has some irritability that could be tied to lack of good sleep.  She denies current depression.  She is on several medications some of which might impact drive.  We discussed treatment options.  Psychologically I provided some education around sexual response and differentiated spontaneous desire from responsive desire and provided some talking points when discussing with Brian.  She has no pain when they are sexual.  I also recommended Better Sex Through Mindfulness.  We discussed pharmacologic options for hypoactive sexual desire disorder (topical testosterone or Addyi) but I suggested that she talk to Dr. Argueta about a trail of HT first (estradiol patch and micronized progesterone) to see if she feels better on this before adding another medication. In addition I informed her that the T and the Addyi are both off-label uses of these medications.  She is to follow up with me after she is out of IOP and has had 3 months on HT if she is prescribed.

## 2024-06-21 NOTE — GROUP NOTE
Group Topic: Chemical Dependency - Primary Disease   Group Date: 6/20/2024  Start Time:  6:00 PM  End Time:  7:00 PM  Facilitators: Mesha Crowe LPC; BARRY Deleon   [facility]    Number of Participants: 7   Treatment Modality: Skills Training  Interventions utilized were group exercise  Purpose: feelings and insight or knowledge  Comment: Beginning of group was focused on introduction to mindfulness exercise. Discussed rational for the practice of mindfulness as well as brief education about some of myths regarding the goals of mindfulness. Allowed time to process the exercise.      Name: Mary Mayers YOB: 1970   MR: [id number]      Level of Participation: active  Quality of Participation: cooperative  Mood/Affect: appropriate  Progress: Gaining insight or knowledge  Plan: continue with services

## 2024-06-21 NOTE — GROUP NOTE
Group Topic: Chemical Dependency - Primary Disease   Group Date: 6/20/2024  Start Time:  8:00 PM  End Time:  9:00 PM  Facilitators: BARRY Deleon   Department: [facility]    Number of Participants: 5   Treatment Modality: Psychoeducation  Interventions utilized were exploration, patient education, and support  Purpose: insight or knowledge and trigger / craving management  Comment:  Today's education session focused on personally identifying signs and symptomatology of substance use disorder including a loss of control over drinking or drug use and identifying life areas that have been affected by addiction.  One patient who is transitioning from IOP to after care, spoke to the group about ways that these principles have manifested in their own life. Patient was engaged and attentive.     Name: Mary Mayers YOB: 1970   MR: [id number]      Level of Participation: active  Quality of Participation: appropriate/pleasant, attentive, cooperative, engaged, and supportive  Mood/Affect: appropriate  Progress: Gaining insight or knowledge  Plan: continue with services

## 2024-06-21 NOTE — GROUP NOTE
Group Topic: Chemical Dependency - Primary Disease   Group Date: 6/20/2024  Start Time:  7:00 PM  End Time:  8:00 PM  Facilitators: Mesha Crowe LPC; BARRY Deleon   [facility]    Number of Participants: 7   Treatment Modality: Psychodynamic Psychotherapy  Interventions utilized were exploration, patient education, problem solving, and support  Purpose: communication skills, insight or knowledge, relapse prevention strategies, and trigger / craving management  Comment: Group therapy.  One group member begins by sharing recently challenging time with his daughter who insisted he drink.  Leads to a larger group discussion related to diagnosis of alcohol/substance use disorder and who makes that decision self vs. doctors.  Used some time to process reading from 'Mind body spirit' which talks about the process of recovery.      Name: Mary Mayers YOB: 1970   MR: [id number]      Level of Participation: when cued  Quality of Participation: quiet  Mood/Affect: appropriate  Progress: Gaining insight or knowledge  Plan: continue with services  Quiet however attentive.

## 2024-06-23 LAB — ETHYL GLUCURONIDE UR QL SCN: NORMAL NG/ML

## 2024-06-24 ENCOUNTER — MULTIDISCIPLINARY VISIT (OUTPATIENT)
Dept: BEHAVIORAL HEALTH | Facility: CLINIC | Age: 54
End: 2024-06-24
Payer: COMMERCIAL

## 2024-06-24 DIAGNOSIS — F10.20 ALCOHOL USE DISORDER, SEVERE (MULTI): Primary | ICD-10-CM

## 2024-06-24 PROCEDURE — 90853 GROUP PSYCHOTHERAPY: CPT | Performed by: COUNSELOR

## 2024-06-25 ENCOUNTER — MULTIDISCIPLINARY VISIT (OUTPATIENT)
Dept: BEHAVIORAL HEALTH | Facility: CLINIC | Age: 54
End: 2024-06-25
Payer: COMMERCIAL

## 2024-06-25 DIAGNOSIS — F10.20 ALCOHOL USE DISORDER, SEVERE, DEPENDENCE (MULTI): ICD-10-CM

## 2024-06-25 PROCEDURE — 90853 GROUP PSYCHOTHERAPY: CPT | Performed by: COUNSELOR

## 2024-06-25 NOTE — GROUP NOTE
Group Topic: Chemical Dependency - Primary Disease   Group Date: 6/24/2024  Start Time:  6:00 PM  End Time:  7:00 PM  Facilitators: Mesha Crowe LPC   [facility]    Number of Participants: 4   Treatment Modality: Psychoeducation  Interventions utilized were patient education  Purpose: insight or knowledge  Comment: Group members completed the 'Recovery Quiz' which asks basic true/false questions regarding addiction recovery.  Each group member took turns answering questions- questions generated discussions regarding role of nonalcoholic beer in recovery, difference between abstinence and sobriety, as well as role of depression, anxiety and stress in developing an addiction.     Name: Mary Mayers YOB: 1970   MR: [id number]      Level of Participation: active  Quality of Participation: cooperative  Mood/Affect: appropriate  Progress: Gaining insight or knowledge  Plan: continue with services

## 2024-06-25 NOTE — CARE PLAN
Problem: D5 Substance free life: Lifestyle which reinforces maintenance of chemical dependency  Goal: STG Patient will abstain from alcohol and all mind altering substances as evidence by negative uds  Outcome: Not Progressing  Goal: LTG Patient will establish a structured recovery peer support program during course of treatment program  Outcome: Not Progressing  Goal: LTG Patient will develop a written plan for continuing treatment post discharge during last scheduled week of treatment  Outcome: Not Progressing     Level of Care Assessment:  D1: Acute Intx/Withdrawal Potential: 1  D2: Biomedical Conditions/Complications: 1  D3: Emtional Behavioral/Cog. Conditions Complications: 1  D4: Treatment Acceptance/Resistance: 1  D5: Relapse/Cont. Use/Cont. Problem Potential: 2  D6: Recovery Environment: 1    6/21 Client has maintained consistent attendance since beginning the program.  UDS results pending from 6/20.  Client has not acknowledged any relapses with alcohol since beginning the program.  No 12-step meeting attendance or family involvement. May need encouragement to follow through with 12-step meetings.

## 2024-06-25 NOTE — GROUP NOTE
Group Topic: Dialectical Behavioral Therapy - Mindfulness   Group Date: 6/24/2024  Start Time:  7:00 PM  End Time:  8:00 PM  Facilitators: Mesha Crowe LPC   [facility]    Number of Participants: 4   Treatment Modality: Skills Training  Interventions utilized were group exercise  Purpose: insight or knowledge  Comment: Beginning of group was focused on introduction to mindfulness exercise. Discussed rational for the practice of mindfulness as well as brief education about some of myths regarding the goals of mindfulness. Allowed time to process exercise.      Name: Mary Mayers YOB: 1970   MR: [id number]      Level of Participation: active  Quality of Participation: cooperative  Mood/Affect: appropriate  Progress: Gaining insight or knowledge  Plan: continue with services

## 2024-06-25 NOTE — GROUP NOTE
Group Topic: Chemical Dependency - Primary Disease   Group Date: 6/24/2024  Start Time:  8:00 PM  End Time:  9:00 PM  Facilitators: Mesha Crowe LPC   [facility]    Number of Participants: 4   Treatment Modality: Psychodynamic Psychotherapy  Interventions utilized were clarification, confrontation, exploration, patient education, and support  Purpose: coping skills, feelings, communication skills, and insight or knowledge  Comment: Group therapy.  Group members are encouraged to take time to share any updates in their addiction recovery.  One group member takes time to share stressful family dynamics.  Many peers relate and offer support along with their own personal experiences.        Name: Mary Mayers YOB: 1970   MR: [id number]      Level of Participation: active  Quality of Participation: cooperative  Mood/Affect: appropriate  Progress: Gaining insight or knowledge  Plan: follow-up needed  Takes time to talk about frustrating family dynamics including being the one that the family looks up to.  Talks about the challenge with setting limits and boundaries with them out of feeling an obligation.  Sad when talking about her fathers situation and the lack of support from her family.

## 2024-06-26 LAB
ETHYL GLUCURONIDE UR CFM-MCNC: NORMAL NG/ML
ETHYL SULFATE UR CFM-MCNC: NORMAL NG/ML

## 2024-06-26 NOTE — GROUP NOTE
Group Topic: Chemical Dependency - Primary Disease   Group Date: 6/25/2024  Start Time:  6:00 PM  End Time:  7:00 PM  Facilitators: Mesha Crowe LPC   [facility]    Number of Participants: 3   Treatment Modality: Psychoeducation  Interventions utilized were patient education  Purpose: insight or knowledge  Comment: Group members learned about the signs and symptoms of a substance use disorder per the DSM- 5 criteria.  Introduced group members to criteria and offered examples of how theses symptoms may manifest.  Explored how criteria is different than previously held beliefs/stereotypes of alcoholism/addiction.  Discussed the role of defense mechanisms in acknowledging criteria.     Name: Mary Mayers YOB: 1970   MR: [id number]      Level of Participation: active  Quality of Participation: cooperative  Mood/Affect: appropriate  Progress: Gaining insight or knowledge  Plan: continue with services

## 2024-06-26 NOTE — GROUP NOTE
Group Topic: Chemical Dependency - Primary Disease   Group Date: 6/25/2024  Start Time:  7:00 PM  End Time:  8:00 PM  Facilitators: Mesha Crowe LPC   [facility]    Number of Participants: 3   Treatment Modality: Skills Training  Interventions utilized were group exercise  Purpose: insight or knowledge  Comment: Beginning of group was focused on introduction to mindfulness exercise. Discussed rational for the practice of mindfulness as well as brief education about some of myths regarding the goals of mindfulness. Allowed time to process exercise.      Name: Mary Mayers YOB: 1970   MR: [id number]      Level of Participation: active  Quality of Participation: cooperative  Mood/Affect: appropriate  Progress: Gaining insight or knowledge  Plan: continue with services

## 2024-06-27 ENCOUNTER — MULTIDISCIPLINARY VISIT (OUTPATIENT)
Dept: BEHAVIORAL HEALTH | Facility: CLINIC | Age: 54
End: 2024-06-27
Payer: COMMERCIAL

## 2024-06-27 DIAGNOSIS — F10.20 ALCOHOL USE DISORDER, SEVERE, DEPENDENCE (MULTI): ICD-10-CM

## 2024-06-27 PROCEDURE — 80307 DRUG TEST PRSMV CHEM ANLYZR: CPT

## 2024-06-27 PROCEDURE — 90853 GROUP PSYCHOTHERAPY: CPT | Performed by: COUNSELOR

## 2024-06-27 NOTE — GROUP NOTE
Group Topic: Chemical Dependency - Primary Disease   Group Date: 6/25/2024  Start Time:  8:00 PM  End Time:  9:00 PM  Facilitators: Mesha Crowe LPC   [facility]    Number of Participants: 3   Treatment Modality: Psychodynamic Psychotherapy  Interventions utilized were exploration, group exercise, patient education, and support  Purpose: coping skills, maladaptive thinking, communication skills, and relapse prevention strategies  Comment: Group therapy.  Used some of group to follow up on education material.  Explored how group members view their use in relation to the DSM criteria.  Used reminder of session to allow group members time to share any updates regarding their addiction recovery.        Name: Mary Mayers YOB: 1970   MR: [id number]      Level of Participation: active  Quality of Participation: attentive  Mood/Affect: appropriate  Progress: Gaining insight or knowledge  Plan: continue with services  Reports continued sobriety.   Relates to multiple dsm criteria and notes symptoms have been present for some time.  Offers good support to peers.

## 2024-06-28 NOTE — CARE PLAN
Problem: D5 Substance free life: Lifestyle which reinforces maintenance of chemical dependency  Goal: STG Patient will abstain from alcohol and all mind altering substances as evidence by negative uds  Outcome: Not Progressing  Goal: LTG Patient will establish a structured recovery peer support program during course of treatment program  Outcome: Not Progressing  Goal: LTG Patient will develop a written plan for continuing treatment post discharge during last scheduled week of treatment  Outcome: Not Progressing     Level of Care Assessment:  D1: Acute Intx/Withdrawal Potential: 1  D2: Biomedical Conditions/Complications: 1  D3: Emtional Behavioral/Cog. Conditions Complications: 2  D4: Treatment Acceptance/Resistance: 1  D5: Relapse/Cont. Use/Cont. Problem Potential: 2  D6: Recovery Environment: 2    6/27/2024 Jt Client has maintained consistent attendance.  In group sessions is quiet however will initiate participation.  Does not share with group recent relapse with alcohol however when pulled by writer after session admits to drinking last Wednesday, 6/19.  Denies drinking since 6/19 and uds results from 6/27 are pending.  No family involvement.  Mood has been sad and is tearful at times in session.  Notes significant outside stressors including caring for her ill father.  No meeting attendance to date.  Is encouraged to work on developing a routine that includes recovery related activities as she identifies boredom and free time as barriers to remaining sober.

## 2024-06-28 NOTE — GROUP NOTE
Group Topic: Chemical Dependency - Primary Disease   Group Date: 6/27/2024  Start Time:  7:00 PM  End Time:  8:00 PM  Facilitators: Mesha Crowe LPC   [facility]    Number of Participants: 5   Treatment Modality: Psychodynamic Psychotherapy  Interventions utilized were exploration, patient education, problem solving, and support  Purpose: coping skills, feelings, communication skills, insight or knowledge, and relapse prevention strategies  Comment: Group therapy.  Group begins with a reading from 'Mind, body, and spirit' which talks about resentments and the journey of finding a higher power.  Good discussion mostly on resentments and why this is detrimental to recovery. Used reminder of session to check in with group members regarding their recovery progress.       Name: Mary Mayers YOB: 1970   MR: [id number]      Level of Participation: when cued  Quality of Participation: attentive and quiet  Mood/Affect: tearful  Plan: follow-up needed  Starts to talk about recent struggles however is tearful and avoids this by leaving the room for a break.  Upon return is attentive to the group discussion however quiet.      After program checked in regarding progress.  Admits to drinking last week and struggling with stress and cravings.  Identifies biggest challenge is alone time.  Is encouraged to work on a schedule over the weekend that includes meeting attendance.  Has resumed taking Naltrexone.      Will continue to monitor.  Denies drinking since relapse last week.

## 2024-06-28 NOTE — GROUP NOTE
Group Topic: Dialectical Behavioral Therapy - Mindfulness   Group Date: 6/27/2024  Start Time:  6:00 PM  End Time:  7:00 PM  Facilitators: Mesha Crowe LPC   Department: [facility]    Number of Participants: 5   Treatment Modality: Skills Training  Interventions utilized were group exercise  Purpose: coping skills, feelings, and insight or knowledge  Comment: Beginning of group was focused on introduction to mindfulness exercise. Discussed rational for the practice of mindfulness as well as brief education about some of myths regarding the goals of mindfulness. Allowed time to process exercise.        Name: Mary Mayers YOB: 1970   MR: [id number]      Level of Participation: active  Quality of Participation: cooperative  Mood/Affect: appropriate  Progress: Gaining insight or knowledge  Plan: continue with services

## 2024-06-28 NOTE — GROUP NOTE
Group Topic: Decision Making   Group Date: 6/27/2024  Start Time:  8:00 PM  End Time:  9:00 PM  Facilitators: Mesha Crowe LPC   [facility]    Number of Participants: 4   Treatment Modality: Psychoeducation  Interventions utilized were group exercise  Purpose: insight or knowledge  Comment: Used session to explore suggestions offered to group members since beginning the recovery process including attending 12-step meetings, finding a sponsor, complete abstinence, obtaining a home group, staying away from old people, places and things etc.  Introduced group to decisional balance which explores the pro's and con's of making a change vs. not making a change.  Worked through example as a group.  Encouraged group members to take some time to work on their own decisional balance regarding a recovery suggestion they are considering.     Name: Mary Mayers YOB: 1970   MR: [id number]      Level of Participation: active  Quality of Participation: attentive  Mood/Affect: appropriate  Progress: Gaining insight or knowledge  Plan: continue with services

## 2024-06-30 LAB — ETHYL GLUCURONIDE UR QL SCN: NEGATIVE NG/ML

## 2024-07-01 ENCOUNTER — MULTIDISCIPLINARY VISIT (OUTPATIENT)
Dept: BEHAVIORAL HEALTH | Facility: CLINIC | Age: 54
End: 2024-07-01
Payer: COMMERCIAL

## 2024-07-01 DIAGNOSIS — F10.20 ALCOHOL USE DISORDER, SEVERE, DEPENDENCE (MULTI): Primary | ICD-10-CM

## 2024-07-01 PROCEDURE — 90853 GROUP PSYCHOTHERAPY: CPT | Performed by: COUNSELOR

## 2024-07-02 ENCOUNTER — APPOINTMENT (OUTPATIENT)
Dept: BEHAVIORAL HEALTH | Facility: CLINIC | Age: 54
End: 2024-07-02
Payer: COMMERCIAL

## 2024-07-02 NOTE — GROUP NOTE
"Group Topic: Chemical Dependency - Primary Disease   Group Date: 7/1/2024  Start Time:  8:00 PM  End Time:  9:00 PM  Facilitators: Mesha Crowe LPC   [facility]    Number of Participants: 5   Treatment Modality: Psychodynamic Psychotherapy  Interventions utilized were confrontation, exploration, orientation, and support  Purpose: coping skills, feelings, communication skills, insight or knowledge, and relapse prevention strategies  Comment: Group therapy.  Group members take time to introduce themselves as a new peer begins group this pm.  One group member takes time to share her struggle with using thoughts, including admitting, \"I think it's not a matter of if I go back to drinking it's when...\"  Good group discussion to follow with peers relating.      Name: Mary Mayers YOB: 1970   MR: [id number]      Level of Participation: active  Quality of Participation: attentive  Mood/Affect: appropriate  Progress: Gaining insight or knowledge  Plan: continue with services  Shares overall she is doing okay- introduces self to peer noting past and current \"set backs.\"  Does not get specific.  Supports this writer in challenging peers denial via relating and sharing of her experience when accountability diminishes noting she was susceptible to relapse.      "

## 2024-07-02 NOTE — GROUP NOTE
Group Topic: Chemical Dependency - Primary Disease   Group Date: 7/1/2024  Start Time:  6:00 PM  End Time:  7:00 PM  Facilitators: Mesha Crowe LPC   [facility]    Number of Participants: 5   Treatment Modality: Psychoeducation  Interventions utilized were group exercise  Purpose: insight or knowledge  Comment: Group members watched the first part of 'Addiction is a treatable chronic relapsing brain disease.'  Video focuses on the disease concept of addiction/alcoholism along with the neurobiology of addiction.  Used remainder of session to process the information and answer any questions.     Name: Mary Mayers YOB: 1970   MR: [id number]      Level of Participation: active  Quality of Participation: attentive  Mood/Affect: appropriate  Progress: Gaining insight or knowledge  Plan: continue with services

## 2024-07-02 NOTE — GROUP NOTE
Group Topic: Dialectical Behavioral Therapy - Mindfulness   Group Date: 7/1/2024  Start Time:  7:00 PM  End Time:  8:00 PM  Facilitators: Mesha Crowe LPC   [facility]    Number of Participants: 5   Treatment Modality: Skills Training  Interventions utilized were group exercise  Purpose: maladaptive thinking, feelings, and insight or knowledge  Comment: Beginning of group was focused on introduction to mindfulness exercise. Discussed rational for the practice of mindfulness as well as brief education about some of myths regarding the goals of mindfulness. Allowed time to process the exercise.       Name: Mary Mayers YOB: 1970   MR: [id number]      Level of Participation: active  Quality of Participation: attentive  Mood/Affect: appropriate  Progress: Gaining insight or knowledge  Plan: continue with services

## 2024-07-03 ENCOUNTER — DOCUMENTATION (OUTPATIENT)
Dept: BEHAVIORAL HEALTH | Facility: CLINIC | Age: 54
End: 2024-07-03
Payer: COMMERCIAL

## 2024-07-03 NOTE — CARE PLAN
"  Problem: D5 Substance free life: Lifestyle which reinforces maintenance of chemical dependency  Goal: STG Patient will abstain from alcohol and all mind altering substances as evidence by negative uds  Outcome: Progressing  Goal: STG Patient will attend scheduled IOP days  Outcome: Progressing  Goal: LTG Patient will establish a structured recovery peer support program during course of treatment program  Outcome: Not Progressing  Goal: LTG Patient will develop a written plan for continuing treatment post discharge during last scheduled week of treatment  Outcome: Not Progressing     7/3/2024 Jt Client is progressing towards the goals as outlined in her treatment plan.  Admits last week to drinking prior week however denies use since- uds results collected 6/27 are negative for all substances which seem to support her report of abstinence.  Alluded to having \"struggles\" in group session however has not processed her relapse with the group. Acknowledges thoughts about future drinking and hears thoughts normalized.   Treatment is complicated by recent loss of her father, on 7/2.  Expected to miss some time as to assist with family matters- requested she keep us updated and if is in need of anything to reach out.  No 12-step participation to date although recognizes this is an important component to long term success.      "

## 2024-07-08 ENCOUNTER — APPOINTMENT (OUTPATIENT)
Dept: BEHAVIORAL HEALTH | Facility: CLINIC | Age: 54
End: 2024-07-08
Payer: COMMERCIAL

## 2024-07-09 ENCOUNTER — APPOINTMENT (OUTPATIENT)
Dept: BEHAVIORAL HEALTH | Facility: CLINIC | Age: 54
End: 2024-07-09
Payer: COMMERCIAL

## 2024-07-11 ENCOUNTER — APPOINTMENT (OUTPATIENT)
Dept: BEHAVIORAL HEALTH | Facility: CLINIC | Age: 54
End: 2024-07-11
Payer: COMMERCIAL

## 2024-07-15 ENCOUNTER — APPOINTMENT (OUTPATIENT)
Dept: BEHAVIORAL HEALTH | Facility: CLINIC | Age: 54
End: 2024-07-15
Payer: COMMERCIAL

## 2024-07-16 ENCOUNTER — APPOINTMENT (OUTPATIENT)
Dept: BEHAVIORAL HEALTH | Facility: CLINIC | Age: 54
End: 2024-07-16
Payer: COMMERCIAL

## 2024-07-18 ENCOUNTER — APPOINTMENT (OUTPATIENT)
Dept: BEHAVIORAL HEALTH | Facility: CLINIC | Age: 54
End: 2024-07-18
Payer: COMMERCIAL

## 2024-07-19 ENCOUNTER — APPOINTMENT (OUTPATIENT)
Dept: PRIMARY CARE | Facility: CLINIC | Age: 54
End: 2024-07-19
Payer: COMMERCIAL

## 2024-07-22 ENCOUNTER — APPOINTMENT (OUTPATIENT)
Dept: BEHAVIORAL HEALTH | Facility: CLINIC | Age: 54
End: 2024-07-22
Payer: COMMERCIAL

## 2024-07-23 ENCOUNTER — APPOINTMENT (OUTPATIENT)
Dept: BEHAVIORAL HEALTH | Facility: CLINIC | Age: 54
End: 2024-07-23
Payer: COMMERCIAL

## 2024-07-25 ENCOUNTER — APPOINTMENT (OUTPATIENT)
Dept: BEHAVIORAL HEALTH | Facility: CLINIC | Age: 54
End: 2024-07-25
Payer: COMMERCIAL

## 2024-07-30 ENCOUNTER — MULTIDISCIPLINARY VISIT (OUTPATIENT)
Dept: BEHAVIORAL HEALTH | Facility: CLINIC | Age: 54
End: 2024-07-30
Payer: COMMERCIAL

## 2024-08-01 ENCOUNTER — MULTIDISCIPLINARY VISIT (OUTPATIENT)
Dept: BEHAVIORAL HEALTH | Facility: CLINIC | Age: 54
End: 2024-08-01
Payer: COMMERCIAL

## 2024-08-01 DIAGNOSIS — F10.20 ALCOHOL USE DISORDER, SEVERE (MULTI): ICD-10-CM

## 2024-08-01 PROCEDURE — 90853 GROUP PSYCHOTHERAPY: CPT

## 2024-08-01 NOTE — GROUP NOTE
Group Topic: Chemical Dependency - Primary Disease   Group Date: 8/1/2024  Start Time:  8:00 PM  End Time:  9:00 PM  Facilitators: BARRY Deleon   Department: [facility]    Number of Participants: 5   Treatment Modality: Psychoeducation  Interventions utilized were patient education  Purpose: relapse prevention strategies  Comment: 60 minute education group.  Patients were from the [facility] level of treatment.  Patient completed a worksheet regarding what values undergird their sobriety efforts.  The worksheet asks what actions the patient has already taken in their sobriety journey, and what the next action step is for them in recovery.  Patient completed the worksheet, and shared their answers.  The patient's answers were then thoroughly discussed with the group.    Name: Mary Mayers YOB: 1970   MR: [id number]      Level of Participation: when cued  Quality of Participation: appropriate/pleasant, attentive, cooperative, and engaged  Mood/Affect: appropriate  Progress: Gaining insight or knowledge  Plan: continue with services.  Patient will continue with [facility] services.

## 2024-08-02 NOTE — GROUP NOTE
Group Topic: Dialectical Behavioral Therapy - Mindfulness   Group Date: 8/1/2024  Start Time:  6:00 PM  End Time:  7:00 PM  Facilitators: Mesha Crowe LPC; BARRY Deleon   [facility]    Number of Participants: 7   Treatment Modality: Skills Training  Interventions utilized were group exercise  Purpose: insight or knowledge  Comment: Beginning of group was focused on introduction to mindfulness exercise. Discussed rational for the practice of mindfulness as well as brief education about some of myths regarding the goals of mindfulness. Allowed time to process the exercise.       Name: Mary Mayers YOB: 1970   MR: [id number]      Level of Participation: active  Quality of Participation: cooperative  Mood/Affect: appropriate  Progress: Gaining insight or knowledge  Plan: continue with services

## 2024-08-02 NOTE — GROUP NOTE
Group Topic: Chemical Dependency - Primary Disease   Group Date: 8/1/2024  Start Time:  7:00 PM  End Time:  8:00 PM  Facilitators: Mesha Crowe LPC; BARRY Deleon   [facility]    Number of Participants: 7   Treatment Modality: Psychodynamic Psychotherapy  Interventions utilized were exploration, problem solving, and support  Purpose: coping skills, communication skills, and relapse prevention strategies  Comment: Group therapy.  Begin with brief introductions as aftercare joined IOP and a former IOP member retuned from a prolonged absence.  Used remainder of session to allow group members time to share any updates, challenges or concerns regarding their addiction recovery.  One group member takes time to process feelings and thoughts related to upcoming family trip.  Hears support and peers who relate.     Name: Mary Mayers YOB: 1970   MR: [id number]      Level of Participation: moderate  Quality of Participation: engaged  Mood/Affect:  congruent to mood, tearful at times  Progress: Gaining insight or knowledge  Plan: follow-up needed  Client is back after prolonged absence due to loss of her father.  Supportive of peers and offers insights to her approach to mindfulness.  Admits she is considering a return to residential treatment however does not share reasoning behind this.

## 2024-08-02 NOTE — CARE PLAN
Problem: D5 Substance free life: Lifestyle which reinforces maintenance of chemical dependency  Goal: STG Patient will abstain from alcohol and all mind altering substances as evidence by negative uds  Outcome: Not Progressing  Goal: STG Patient will attend scheduled IOP days  Outcome: Progressing  Goal: LTG Patient will establish a structured recovery peer support program during course of treatment program  Outcome: Not Progressing  Goal: LTG Patient will develop a written plan for continuing treatment post discharge during last scheduled week of treatment  Outcome: Not Progressing      Level of Care Assessment:  D1: Acute Intx/Withdrawal Potential: 1  D2: Biomedical Conditions/Complications: 0  D3: Emtional Behavioral/Cog. Conditions Complications: 2  D4: Treatment Acceptance/Resistance: 1  D5: Relapse/Cont. Use/Cont. Problem Potential: 2  D6: Recovery Environment: 2    8/1/2024 Client has returned to [facility] after extended absence due to the loss of her father.  Admits struggling with sobriety while out noting her last use of alcohol was 7/30.  In addition to struggling with sobriety she notes increased anxiety in form of panic attacks at the thought of returning to work as well as handling other affairs related to her father's passing.  Ambivalent regarding wanting a higher level of care as she initially suggests possibly returning to residential treatment.  Agrees to consider several options regarding treatment including residential, PHP, or IOP and will call writer on 8/2 to advise of decision.
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
## 2024-08-05 ENCOUNTER — APPOINTMENT (OUTPATIENT)
Dept: BEHAVIORAL HEALTH | Facility: CLINIC | Age: 54
End: 2024-08-05
Payer: COMMERCIAL

## 2024-08-05 ENCOUNTER — OFFICE VISIT (OUTPATIENT)
Dept: BEHAVIORAL HEALTH | Facility: CLINIC | Age: 54
End: 2024-08-05
Payer: COMMERCIAL

## 2024-08-05 ENCOUNTER — MULTIDISCIPLINARY VISIT (OUTPATIENT)
Dept: BEHAVIORAL HEALTH | Facility: CLINIC | Age: 54
End: 2024-08-05
Payer: COMMERCIAL

## 2024-08-05 VITALS
SYSTOLIC BLOOD PRESSURE: 136 MMHG | WEIGHT: 163 LBS | DIASTOLIC BLOOD PRESSURE: 66 MMHG | HEART RATE: 103 BPM | HEIGHT: 62 IN | BODY MASS INDEX: 30 KG/M2

## 2024-08-05 DIAGNOSIS — F41.1 GAD (GENERALIZED ANXIETY DISORDER): Primary | ICD-10-CM

## 2024-08-05 DIAGNOSIS — F10.20 ALCOHOL USE DISORDER, SEVERE (MULTI): ICD-10-CM

## 2024-08-05 DIAGNOSIS — F10.20 ALCOHOL USE DISORDER, SEVERE (MULTI): Primary | ICD-10-CM

## 2024-08-05 DIAGNOSIS — F33.0 MILD EPISODE OF RECURRENT MAJOR DEPRESSIVE DISORDER (CMS-HCC): ICD-10-CM

## 2024-08-05 LAB
ALBUMIN SERPL BCP-MCNC: 4.3 G/DL (ref 3.4–5)
ALP SERPL-CCNC: 86 U/L (ref 33–110)
ALT SERPL W P-5'-P-CCNC: 18 U/L (ref 7–45)
ANION GAP SERPL CALC-SCNC: 15 MMOL/L (ref 10–20)
AST SERPL W P-5'-P-CCNC: 25 U/L (ref 9–39)
BILIRUB SERPL-MCNC: 0.3 MG/DL (ref 0–1.2)
BUN SERPL-MCNC: 19 MG/DL (ref 6–23)
CALCIUM SERPL-MCNC: 10 MG/DL (ref 8.6–10.6)
CHLORIDE SERPL-SCNC: 101 MMOL/L (ref 98–107)
CO2 SERPL-SCNC: 24 MMOL/L (ref 21–32)
CREAT SERPL-MCNC: 1.07 MG/DL (ref 0.5–1.05)
EGFRCR SERPLBLD CKD-EPI 2021: 62 ML/MIN/1.73M*2
ERYTHROCYTE [DISTWIDTH] IN BLOOD BY AUTOMATED COUNT: 13.2 % (ref 11.5–14.5)
GGT SERPL-CCNC: 25 U/L (ref 5–55)
GLUCOSE SERPL-MCNC: 126 MG/DL (ref 74–99)
HAV IGM SER QL: NONREACTIVE
HBV CORE IGM SER QL: NONREACTIVE
HBV SURFACE AG SERPL QL IA: NONREACTIVE
HCT VFR BLD AUTO: 40.8 % (ref 36–46)
HCV AB SER QL: NONREACTIVE
HGB BLD-MCNC: 13.3 G/DL (ref 12–16)
MAGNESIUM SERPL-MCNC: 2.39 MG/DL (ref 1.6–2.4)
MCH RBC QN AUTO: 31.5 PG (ref 26–34)
MCHC RBC AUTO-ENTMCNC: 32.6 G/DL (ref 32–36)
MCV RBC AUTO: 97 FL (ref 80–100)
NRBC BLD-RTO: 0 /100 WBCS (ref 0–0)
PLATELET # BLD AUTO: 300 X10*3/UL (ref 150–450)
POTASSIUM SERPL-SCNC: 4.1 MMOL/L (ref 3.5–5.3)
PROT SERPL-MCNC: 7.8 G/DL (ref 6.4–8.2)
RBC # BLD AUTO: 4.22 X10*6/UL (ref 4–5.2)
SODIUM SERPL-SCNC: 136 MMOL/L (ref 136–145)
TSH SERPL-ACNC: 0.97 MIU/L (ref 0.44–3.98)
WBC # BLD AUTO: 9 X10*3/UL (ref 4.4–11.3)

## 2024-08-05 PROCEDURE — 83735 ASSAY OF MAGNESIUM: CPT | Performed by: NURSE PRACTITIONER

## 2024-08-05 PROCEDURE — 99417 PROLNG OP E/M EACH 15 MIN: CPT | Performed by: NURSE PRACTITIONER

## 2024-08-05 PROCEDURE — 85027 COMPLETE CBC AUTOMATED: CPT | Performed by: NURSE PRACTITIONER

## 2024-08-05 PROCEDURE — 90853 GROUP PSYCHOTHERAPY: CPT

## 2024-08-05 PROCEDURE — 87389 HIV-1 AG W/HIV-1&-2 AB AG IA: CPT | Performed by: NURSE PRACTITIONER

## 2024-08-05 PROCEDURE — 86705 HEP B CORE ANTIBODY IGM: CPT | Performed by: NURSE PRACTITIONER

## 2024-08-05 PROCEDURE — 2500000001 HC RX 250 WO HCPCS SELF ADMINISTERED DRUGS (ALT 637 FOR MEDICARE OP): Performed by: NURSE PRACTITIONER

## 2024-08-05 PROCEDURE — 84443 ASSAY THYROID STIM HORMONE: CPT | Performed by: NURSE PRACTITIONER

## 2024-08-05 PROCEDURE — S9445 PT EDUCATION NOC INDIVID: HCPCS

## 2024-08-05 PROCEDURE — 82977 ASSAY OF GGT: CPT | Performed by: NURSE PRACTITIONER

## 2024-08-05 PROCEDURE — 36415 COLL VENOUS BLD VENIPUNCTURE: CPT | Performed by: NURSE PRACTITIONER

## 2024-08-05 PROCEDURE — 80053 COMPREHEN METABOLIC PANEL: CPT | Performed by: NURSE PRACTITIONER

## 2024-08-05 PROCEDURE — 99215 OFFICE O/P EST HI 40 MIN: CPT | Performed by: NURSE PRACTITIONER

## 2024-08-05 RX ORDER — FLUOXETINE HYDROCHLORIDE 20 MG/1
20 CAPSULE ORAL DAILY
Qty: 30 CAPSULE | Refills: 0 | Status: SHIPPED | OUTPATIENT
Start: 2024-08-05 | End: 2024-09-04

## 2024-08-05 RX ORDER — BISMUTH SUBSALICYLATE 262 MG
1 TABLET,CHEWABLE ORAL DAILY
Status: COMPLETED | OUTPATIENT
Start: 2024-08-05 | End: 2024-08-09

## 2024-08-05 RX ORDER — FOLIC ACID 1 MG/1
1 TABLET ORAL DAILY
Status: COMPLETED | OUTPATIENT
Start: 2024-08-05 | End: 2024-08-09

## 2024-08-05 RX ORDER — LANOLIN ALCOHOL/MO/W.PET/CERES
100 CREAM (GRAM) TOPICAL DAILY
Status: COMPLETED | OUTPATIENT
Start: 2024-08-05 | End: 2024-08-09

## 2024-08-05 ASSESSMENT — LIFESTYLE VARIABLES
PAROXYSMAL SWEATS: NO SWEAT VISIBLE
AUDITORY DISTURBANCES: NOT PRESENT
CIWA TOTAL SCORE: 0
HEADACHE, FULLNESS IN HEAD: NOT PRESENT
NAUSEA AND VOMITING: NO NAUSEA AND NO VOMITING
TREMOR: NO TREMOR
ORIENTATION AND CLOUDING OF SENSORIUM: ORIENTED AND CAN DO SERIAL ADDITIONS
ANXIETY: NO ANXIETY, AT EASE
VISUAL DISTURBANCES: NOT PRESENT
AGITATION: NORMAL ACTIVITY

## 2024-08-05 ASSESSMENT — PAIN SCALES - GENERAL: PAINLEVEL: 0-NO PAIN

## 2024-08-05 NOTE — PROGRESS NOTES
[facility] Clinical Progress Note      Name: Mary Mayers  MRN: [id number]   YOB: 1970    Date: 08/05/24    Record Review: Yes   OARRS Reviewed: Reviewed OARRS on 8/5/24-OARRS has been reviewed and is consistent with prescribed medications diagnosis.    Summary:  Mary  is a 52yo female presenting to [facility] today for treatment of Alcohol use disorder. She reports consuming a half gallon of wine every 2-3 days since her relapse.  Last use Sunday evening at 8pm had 1.5 glasses of wine. She denies CNS withdrawal. She endorses low mood and anxiety. Open to trial of psychotropic medications to target mood and cravings.     IMPRESSION  Alcohol use disorder, severe  Major Depressive disorder  Generalized Anxiety  R/o PTSD    PLAN  Admit to PHP  Monitor for CNS withdrawal- CIWA 0  Monitor mood for destabilization  Group psychotherapy  Group Psychoeducation  Start Prozac to target mood  Resume Naltrexone to target Cravings once LFTs obtained  Recommend 12 step program  Continue home medication as prescribed  Follow up with PCP as scheduled  Rec individual therapy to target anxiety, grief    Treatment Plan/Recommendations: Continue current medications, follow up as scheduled    Diagnosis: Alcohol use disorder severe, MDD, DAMION    Complexity Issues:  Number of diagnoses or management options multiple  Problems effect on treatment and management yes  Risk of complications and/or morbidity or mortality moderate  Coordination of care (e.g. with patient and/or family, social workers, nursing staff, other doctors) >50% of visit    Patient Response to Treatment:  Risks, benefits, side effects, drug to drug interactions and alternatives to treatment were discussed in my usual manner: yes    Patient response to treatment fair  Reason for not reducing medication dose(s) high risk of patient's deterioration    Topics Discussed:  Nature of diagnosis and/or prognosis, Aspects of aging process and relationship to the current  problem, Nature of possible treatment options/drug interaction, Risk of non-treatment, Psychopharmacologic treatment options/possible benefits and risks, Nature of reasons for and possible benefits from psychotherapy, Family and/or situational stressors, Behavioral and/or environmental changes that might help, Medical records reviewed, Communication with facility staff, Referred for psychotherapy, and Forms/reports filled out    Psychotherapy/Counseling: Treatment plan and recommendations    Review with patient: Treatment plan reviewed with the patient.  Medication risks/benefit reviewed with the patient    Subjective   Chief Complaint: No chief complaint on file..    HPI:   Mary is a 52yo female with PMHx htn, SVT, hearing loss, depression, anxiety is presenting to [facility] today for treatment of alcohol use disorder. She has been in [facility] but was having difficulty with abstinence from alcohol, therefore was decided higher level of care was needed. She reports consuming half gallon of wine daily prior to entering [facility] a few months ago. She struggled with abstinence during [facility] and actually stopped coming for a few weeks after the death of her father. She is currently consuming half gallon of wine 2-3 days per week.  Last consumption Sunday around 8pm. She denies CNS withdrawal. Denies history of complicated withdrawal characterized by seizures or Delirium Tremens. Reports history of recovery on and off over the years, had period of couple months last summer (July-sept) after being in [facility] for 90 days. On evaluation this morning, no noted CNS withdrawal.     Patient reports mood has been depressed and anxious recently. Sleep and appetite are adequate. Denies wishes for death or consideration for suicide. CSSRS negative.   Physical/Somatic Complaints  The patient lists: no physical complaints.    Past Psychiatric History:   Previous therapy: yes with [facility]  Previous psychiatric treatment and medication  trials: yes - Zoloft, Hydroxyzine, Gabapentin, Naltrexone, Campral  Previous psychiatric hospitalizations: no  Previous diagnoses: yes - MDD, DAMION, PTSD  Previous suicide attempts: no  History of violence: no  Currently in treatment with n/a.  Education: college graduate  Other pertinent history: None  Depression screening was performed with standardized tool: Yes - Depression    Medical History:  Past Medical History:   Diagnosis Date    Anxiety     Depression     Essential (primary) hypertension 10/21/2021    Benign essential hypertension    HL (hearing loss)     Other seasonal allergic rhinitis     Seasonal allergies     Current Medications:     Current Outpatient Medications:     folic acid (Folvite) 1 mg tablet, , Disp: , Rfl:     gabapentin (Neurontin) 300 mg capsule, Take 1 capsule (300 mg) by mouth 3 times a day., Disp: 270 capsule, Rfl: 0    hydroCHLOROthiazide (HYDRODiuril) 12.5 mg tablet, , Disp: , Rfl:     hydrOXYzine HCL (Atarax) 50 mg tablet, , Disp: , Rfl:     losartan (Cozaar) 50 mg tablet, , Disp: , Rfl:     metoprolol succinate XL (Toprol-XL) 100 mg 24 hr tablet, TAKE ONE TABLET BY MOUTH ONE TIME DAILY, Disp: 90 tablet, Rfl: 0    topiramate (Topamax) 25 mg tablet, Take 1 tablet (25 mg) by mouth once daily for 7 days, THEN 2 tablets (50 mg) once daily for 7 days, THEN 3 tablets (75 mg) once daily for 7 days, THEN 4 tablets (100 mg) once daily., Disp: 282 tablet, Rfl: 1    traZODone (Desyrel) 50 mg tablet, , Disp: , Rfl:     Active Problems:  Patient Active Problem List    Diagnosis Date Noted    DAMION (generalized anxiety disorder) 06/04/2024    Mild episode of recurrent major depressive disorder (CMS-HCC) 06/04/2024    Primary insomnia 06/04/2024    Alcoholism (Multi) 03/18/2024    Abnormal uterine bleeding (AUB) 07/07/2023    Anxiety 07/07/2023    Closed fracture of neck of right fibula 07/07/2023    Dizziness 07/07/2023    Hearing loss 07/07/2023    Paroxysmal SVT (supraventricular tachycardia)  (CMS-[facility]) 07/07/2023    Injury of right knee 07/07/2023    Medication side effect 07/07/2023    Mixed conductive and sensorineural hearing loss of both ears 07/07/2023    Obesity 07/07/2023    PVC's (premature ventricular contractions) 07/07/2023    Syncope 07/07/2023    Tachycardia 07/07/2023    Tinnitus, right ear 07/07/2023    Trichomonas vaginalis infection 07/07/2023    Laxity of abdominal wall 05/05/2022    Laxity of skin 05/05/2022    Excess skin of abdomen 05/04/2022    Alcohol use disorder, severe (Multi) 01/03/2018    Benign essential hypertension 01/03/2018    History of bilateral tubal ligation 01/03/2018    Menorrhagia 04/10/2015    Fracture, foot 05/24/2010     Surgical History:  Past Surgical History:   Procedure Laterality Date    BREAST SURGERY      CHOLECYSTECTOMY  03/26/2018    Cholecystectomy Laparoscopic    COSMETIC SURGERY      OTHER SURGICAL HISTORY  04/08/2020    Gastric bypass surgery    OTHER SURGICAL HISTORY  03/16/2021    Vulvectomy    TUBAL LIGATION  03/26/2018    Tubal Ligation     Allergies:  Allergies   Allergen Reactions    Fish Containing Products Anaphylaxis    Lisinopril Cough    Latex Rash       Review of Systems:    Constitutional: Denies cough, weight changes, sweats or fever  Eyes/Ears/Nose/Throat: wears glasses denies difficulty hearing or pain in ears, denies sore throat or harshness, denies rhinorrhea  Respiratory: Denies shortness of breath   Cardiovascular: hx svt, htn  Gastrointestinal: Denies abdominal pain, nausea or vomiting  Genitourinary: Denies incontinence, pain or frequency with urination  Neurological: Denies numbness, tingling, tremor. Denies hx seizures  Hematologic/Lymphatic: Denies bleeding disorders  Endocrine: denies hx thyroid disease or diabetes  Musculoskeletal: gait steady  Integumentary: skin warm and dry, no rash or lesions noted     Family History:  Family History   Problem Relation Name Age of Onset    Colon cancer Mother Cintia     Alcohol abuse  Father Sawyer     Stroke Father Sawyer      Social History:  Social History     Socioeconomic History    Marital status: Single     Spouse name: Not on file    Number of children: Not on file    Years of education: Not on file    Highest education level: Not on file   Occupational History    Occupation: Nurse   Tobacco Use    Smoking status: Former     Types: Cigarettes    Smokeless tobacco: Current   Vaping Use    Vaping status: Some Days   Substance and Sexual Activity    Alcohol use: Yes     Alcohol/week: 10.0 standard drinks of alcohol     Types: 10 Glasses of wine per week     Comment: 4-5 glasses of wine/day    Drug use: Never    Sexual activity: Not on file   Other Topics Concern    Not on file   Social History Narrative    Not on file     Social Determinants of Health     Financial Resource Strain: Not on File (2023)    Received from [facility]    Financial Resource Strain     Financial Resource Strain: 0   Food Insecurity: Not on File (2023)    Received from [facility]    Food Insecurity     Food: 0   Transportation Needs: Not on File (2023)    Received from [facility]    Transportation Needs     Transportation: 0   Physical Activity: Not on File (2023)    Received from [facility]    Physical Activity     Physical Activity: 0   Stress: Not on File (2023)    Received from [facility]    Stress     Stress: 0   Social Connections: Not on File (2023)    Received from [facility]    Social Connections     Social Connections and Isolation: 0   Intimate Partner Violence: Not on file   Housing Stability: Not on File (2023)    Received from [facility]    Housing Stability     Housin       Psychiatric Review Of Systems  Japonica reports history of depression and anxiety for several years.  Depression is characterized by experiencing low mood, anhedonia, low motivation, increased sleep, decreased energy, isolation. Denies wishes for death or consideration for suicide.  CSSRS  negative. Also reports experiencing anxiety characterized by uncontrolled worry, mind racing, restlessness, heart racing. Reports hx of panic attacks recently characterized by sob, sweats. Denies ever experiencing rosalba of psychosis. Denies hx hospitalization.   Objective        Mental Status Exam:   The patient is alert, pleasant, and cooperative. Well-groomed and maintains good eye contact. Insight and judgment are fair. Oriented to time, place, and person. Speech is goal-directed, coherent, and of normal rate and tone. Thought process is logical. Mood is depressed and affect is congruent. Denies auditory or visual hallucinations. No overt signs of psychosis or rosalba noted. Denies wishes for death or consideration for suicide. Denies thoughts of violence toward others.     PHYSICAL EXAM  GENERAL: Alert and oriented x 3. No acute distress. Well-nourished.  HEENT: Moist mucous membranes. No scleral icterus.   LUNGS: Clear to auscultation bilaterally. No accessory muscle use.  CARDIOVASCULAR: Regular rate and rhythm.   ABDOMEN: Soft, non-tender and non-distended. No palpable masses.  EXTREMITIES: No edema. Non-tender.?  SKIN: No rashes or lesions. Warm and Dry  NEUROLOGIC: No tremor noted.  PSYCHIATRIC: Cooperative. Appropriate mood and affect.     Results:    CIWA 0   BERNIE 0.00  146/77/79            Wt Readings from Last 1 Encounters:   03/28/24 76.7 kg (169 lb 3.2 oz)           Total time on date of patient encounter 98    KCAY Taveras-CNP

## 2024-08-05 NOTE — GROUP NOTE
Group Topic: Feeling Awareness/Expression   Group Date: 8/5/2024  Start Time: 10:00 AM  End Time: 11:00 AM  Facilitators: BLANK Cotto   [facility]    Number of Participants: 5   Treatment Modality: Cognitive Behavioral Therapy  Interventions utilized were group exercise and patient education  Purpose: coping skills, feelings, and insight or knowledge  Comment: Emotional management for Addiction Recovery    Emotions and Addiction Recovery    Goal of this session is to educate about the role of emotions and their relationship with addiction recovery. A power point presentation was reviewed and handouts were distributed. Discussion encouraged throughout to encourage members to self relate and identify their personal emotional responses and identification f motions. Overview of CBT completed and several examples  used to apply the education content.       PHP-  First day of PHP treatment. Self relating to material and sharing relatable content.    Name: Mary Mayers YOB: 1970   MR: [id number]      Level of Participation: active  Quality of Participation: engaged  Mood/Affect: flat  Progress: Gaining insight or knowledge  Plan: continue with services

## 2024-08-05 NOTE — GROUP NOTE
"Group Topic: Chemical Dependency - Primary Disease   Group Date: 8/5/2024  Start Time:  1:00 PM  End Time:  2:00 PM  Facilitators: [facility]   Department: [facility]    Number of Participants: 2   Treatment Modality: Interpersonal Therapy  Interventions utilized were assignment, exploration, group exercise, and support  Purpose: relapse prevention strategies  Comment: 60 minute activity group.  Patient is a member of the [facility] level of care. Patient participated in the \"Dr. Aguero/Mr. Oleae\" exercise for self-reflection and learning.  Patient listed \"Mr Bush\" negative behaviors and attitudes they exhibited when actively using vs. \"Dr. Aguero\" positive behaviors and attitudes that they displayed when sober.  Patient also participated in discussion about the exercise.    Name: Mary Mayers YOB: 1970   MR: [id number]      Level of Participation: active  Quality of Participation: appropriate/pleasant, attentive, cooperative, and engaged  Mood/Affect: appropriate  Progress: Gaining insight or knowledge  Plan: continue with services.  Patient will continue with PHP services.      "

## 2024-08-05 NOTE — GROUP NOTE
Group Topic: Chemical Dependency - Relapse   Group Date: 8/5/2024  Start Time: 11:00 AM  End Time: 12:00 PM  Facilitators: BLANK Cotto; BARRY Deleon   Department: [facility]    Number of Participants: 11   Treatment Modality: Cognitive Behavioral Therapy, Interpersonal Therapy, and Psychoeducation  Interventions utilized were exploration, patient education, and support  Purpose: coping skills, insight or knowledge, and trigger / craving management  Comment: Group Counseling    Group began with a meditative reading about Step 2 and developing belief in a Higher Power. Pts. were encouraged to reflect and respond. Members were encouraged to provide the group with an update regarding their recovery progress, as well as discuss shared experiences and observations.    PHP-  Introduced self to group as this is her first day of PHP program. She described at length the obsession' in her mind and resulting behaviors that support ongoing drinking. She has attended AA in the past and understands she will need to attend again and establish a sober support system.   Name: Mary Mayers YOB: 1970   MR: [id number]      Level of Participation: active  Quality of Participation: engaged  Mood/Affect: appropriate; flat  Progress: Gaining insight or knowledge  Plan: continue with services

## 2024-08-05 NOTE — PROGRESS NOTES
ARS Nurse Note    Name: Mary Mayers  MRN: [id number]  YOB: 1970    Date: 08/05/24    Reason for Visit:  No chief complaint on file.    Vitals:  BP: 146/77  Heart Rate: 79    Patient Active Problem List   Diagnosis    Abnormal uterine bleeding (AUB)    Alcohol use disorder, severe (Multi)    Anxiety    Benign essential hypertension    Closed fracture of neck of right fibula    Dizziness    Excess skin of abdomen    Hearing loss    History of bilateral tubal ligation    Paroxysmal SVT (supraventricular tachycardia) (CMS-[facility])    Injury of right knee    Laxity of abdominal wall    Laxity of skin    Medication side effect    Mixed conductive and sensorineural hearing loss of both ears    Obesity    PVC's (premature ventricular contractions)    Syncope    Tachycardia    Tinnitus, right ear    Trichomonas vaginalis infection    Fracture, foot    Menorrhagia    Alcoholism (Multi)    DAMION (generalized anxiety disorder)    Mild episode of recurrent major depressive disorder (CMS-[facility])    Primary insomnia       Allergies   Allergen Reactions    Fish Containing Products Anaphylaxis    Lisinopril Cough    Latex Rash       Outpatient Encounter Medications as of 8/5/2024   Medication Sig Dispense Refill    FLUoxetine (PROzac) 20 mg capsule Take 1 capsule (20 mg) by mouth once daily. 30 capsule 0    folic acid (Folvite) 1 mg tablet       hydroCHLOROthiazide (HYDRODiuril) 12.5 mg tablet       hydrOXYzine HCL (Atarax) 50 mg tablet       losartan (Cozaar) 50 mg tablet       metoprolol succinate XL (Toprol-XL) 100 mg 24 hr tablet TAKE ONE TABLET BY MOUTH ONE TIME DAILY 90 tablet 0    traZODone (Desyrel) 50 mg tablet       [DISCONTINUED] gabapentin (Neurontin) 300 mg capsule Take 1 capsule (300 mg) by mouth 3 times a day. 270 capsule 0    [DISCONTINUED] topiramate (Topamax) 25 mg tablet Take 1 tablet (25 mg) by mouth once daily for 7 days, THEN 2 tablets (50 mg) once daily for 7 days, THEN 3 tablets (75 mg) once daily for  7 days, THEN 4 tablets (100 mg) once daily. 282 tablet 1     No facility-administered encounter medications on file as of 8/5/2024.       Progress:    Japonica was compliant with admission. She reports sleeping 6 hours  overnight. She rates cravings for alcohol as high 10/10 with 10 being the most severe. She was previously in the evening IOP program but was unable to completely stop drinking, reporting drinking 1/2 gallon of wine over a 2-3 days in the week. She reports last drink was Sunday 1.5 glasses of wine and prior to that she reports last drinking Wednesday of last week.     1200: Japonica denies any new or worsening symptoms    1300: Mary reports she was able to eat and tolerate lunch. Denies any new or worsening symptoms   Medication administration: folic acid 1 mg PO, Thiamine 100 mg PO, multivitamin 1 tab PO per order    1400: Mary voiced no concerns.

## 2024-08-05 NOTE — GROUP NOTE
Group Topic: Dialectical Behavioral Therapy - Mindfulness   Group Date: 8/5/2024  Start Time:  9:00 AM  End Time: 10:00 AM  Facilitators: BARRY Deleon   Department: [facility]    Number of Participants: 5   Treatment Modality: Dialectical Behavioral Therapy  Interventions utilized were exploration, group exercise, and support  Purpose: coping skills, feelings, communication skills, and trigger / craving management  Comment: Patient participated in a mindfulness meditation exercise and a group check-in.  The purpose and benefits of mindfulness meditation in strengthening relapse prevention skills was explained.  A mindfulness meditation exercise was conducted, and patient provided feedback in the form of personal observations from the exercise.      Name: Mary Mayers YOB: 1970   MR: [id number]      Level of Participation: when cued  Quality of Participation: appropriate/pleasant, attentive, cooperative, and engaged  Mood/Affect: appropriate  Progress: Gaining insight or knowledge  Plan: continue with services.  Patient will continue with PHP services.

## 2024-08-06 ENCOUNTER — MULTIDISCIPLINARY VISIT (OUTPATIENT)
Dept: BEHAVIORAL HEALTH | Facility: CLINIC | Age: 54
End: 2024-08-06
Payer: COMMERCIAL

## 2024-08-06 ENCOUNTER — OFFICE VISIT (OUTPATIENT)
Dept: BEHAVIORAL HEALTH | Facility: CLINIC | Age: 54
End: 2024-08-06
Payer: COMMERCIAL

## 2024-08-06 ENCOUNTER — APPOINTMENT (OUTPATIENT)
Dept: BEHAVIORAL HEALTH | Facility: CLINIC | Age: 54
End: 2024-08-06
Payer: COMMERCIAL

## 2024-08-06 VITALS — DIASTOLIC BLOOD PRESSURE: 79 MMHG | SYSTOLIC BLOOD PRESSURE: 151 MMHG | HEART RATE: 99 BPM

## 2024-08-06 DIAGNOSIS — F10.20 ALCOHOL USE DISORDER, SEVERE (MULTI): ICD-10-CM

## 2024-08-06 DIAGNOSIS — F41.1 GAD (GENERALIZED ANXIETY DISORDER): ICD-10-CM

## 2024-08-06 DIAGNOSIS — F33.0 MILD EPISODE OF RECURRENT MAJOR DEPRESSIVE DISORDER (CMS-HCC): ICD-10-CM

## 2024-08-06 LAB — HIV 1+2 AB+HIV1 P24 AG SERPL QL IA: NONREACTIVE

## 2024-08-06 PROCEDURE — 2500000001 HC RX 250 WO HCPCS SELF ADMINISTERED DRUGS (ALT 637 FOR MEDICARE OP): Performed by: NURSE PRACTITIONER

## 2024-08-06 PROCEDURE — 99214 OFFICE O/P EST MOD 30 MIN: CPT | Performed by: NURSE PRACTITIONER

## 2024-08-06 PROCEDURE — 90853 GROUP PSYCHOTHERAPY: CPT

## 2024-08-06 PROCEDURE — S9445 PT EDUCATION NOC INDIVID: HCPCS

## 2024-08-06 PROCEDURE — 99417 PROLNG OP E/M EACH 15 MIN: CPT | Performed by: NURSE PRACTITIONER

## 2024-08-06 PROCEDURE — 99213 OFFICE O/P EST LOW 20 MIN: CPT | Performed by: STUDENT IN AN ORGANIZED HEALTH CARE EDUCATION/TRAINING PROGRAM

## 2024-08-06 ASSESSMENT — PAIN SCALES - GENERAL: PAINLEVEL: 0-NO PAIN

## 2024-08-06 NOTE — CARE PLAN
Problem: D1 Physiologic and Psychological Stability: Withdrawal from alcohol  Goal: Physiologic and Psychological Stability and progression through withdrawal symptomology  Outcome: Progressing  Goal: STG Patient will identify support persons by end of day 1  Outcome: Progressing  Goal: STG Patient will identify personally negative physical consequences of substance dependence by day 1  Outcome: Progressing  Goal: LTG Detoxification will be completed with minimally reported patient discomfort within week 1  Outcome: Progressing  Goal: LTG Detoxification will be completed with minimally reported patient discomfort within week 1  Outcome: Progressing  Goal: LTG Patient will list personally relevant relapse triggers by end of Partial Hospitalization Program (1-2 weeks)  Outcome: Progressing  Goal: LTG Patient will have information and a plan for continuing treatment prior to discharge by end of Partial Hospitalization Program (1-2 weeks)  Outcome: Progressing     Problem: D5 Substance free life: Lifestyle which reinforces maintenance of chemical dependency  Goal: STG Patient will discuss key principals of addictive disease and relate them to their personal experience by end of first week of treatment program  Outcome: Progressing  Goal: STG Patient will identify three personal reinforcers of chemical dependency by end of second week of treatment program  Outcome: Progressing  Goal: LTG Patient will establish a structured recovery peer support program during course of treatment program  Outcome: Progressing  Goal: LTG Patient will develop a written plan for continuing treatment post discharge during last scheduled week of treatment  Outcome: Progressing    Japonica is well known to [facility] as she was in the evening IOP group. She left the program in early July and experienced the loss of her father. She struggled with sobriety while in IOP and during the time of absence started drinking a gallon of wine every couple of  days. She denies withdrawal symptoms and there have been no observable signs of withdrawal. She rates cravings as 10/10 and has been going to meetings and using Naltrexone to help navigate cravings. Plan is to complete 2 weeks of PHP followed by IOP

## 2024-08-06 NOTE — PROGRESS NOTES
Addiction Recovery Services - [facility] Intake - Psychiatrist    Maribell Hernandez LLOYD Mayers is a 53 y.o. female here for   Chief Complaint   Patient presents with    Addiction Problem     Brief HPI  53F with severe AUD, continuing to drink a half gallon of wine every 2-3 days while engaged in addiction IOP.    Has been off work due to bereavement as well, as her father recently  a month ago.  High cravings  Worsening GAD7 and PHQ9 scores in the past couple of months.    Why treatment now: ongoing - originally seeking treatment in the context of blackouts, but the real reason was after she left food cooking on the stovetop and went to sleep - partner found it before any damage happened    Social Context  Relationships: Brian (partner), children (3 in their 30s), grandchildren, mother (caring for her)  Work/school: [employer] over 25 years, liked it for a while but now struggling with management changes  Activities: cooking; has some girlfriends but like to go out and drink    STEPHANIE History  When started using: in her 30s  Longest period of sobriety: months at a time, including rehab  When relapsed and why: had a drink for her birthday but that was fine, had weeks of abstinence after that, but kept thinking it was fine to have one, then relapsed  Past treatments: Residential for 90 days last year, relapsed shortly after    Psychiatric Comorbidities  Mood: low - related to father's passing, just started on prozac by ARS NP  Anxiety: high - unable to go back to work, had a panic attack, she was at work when they called her about her father  Madelin: none  Psychosis: none    Medical Comorbidities  Menopause  Hypertension  Paroxysmal SVT - med-controlled    Objective   Mental Status Exam:  General Appearance: Well groomed, appropriate eye contact  Attitude/Behavior: Cooperative  Motor: No psychomotor agitation or retardation, no tremor or other abnormal movements  Speech: Normal rate, volume, prosody  Mood:  depressed  Affect: Sad/Tearful  Thought Process: Linear, goal directed  Thought Associations: No loosening of associations  Thought Content: Normal  Perception: No perceptual abnormalities noted  Sensorium: Alert  Insight: Intact  Judgement: Intact  Cognition: Cognitively intact to conversational testing with respect to attention, orientation, fund of knowledge, recent and remote memory, and language    Lab Review:   Office Visit on 08/05/2024   Component Date Value    WBC 08/05/2024 9.0     nRBC 08/05/2024 0.0     RBC 08/05/2024 4.22     Hemoglobin 08/05/2024 13.3     Hematocrit 08/05/2024 40.8     MCV 08/05/2024 97     MCH 08/05/2024 31.5     MCHC 08/05/2024 32.6     RDW 08/05/2024 13.2     Platelets 08/05/2024 300     Glucose 08/05/2024 126 (H)     Sodium 08/05/2024 136     Potassium 08/05/2024 4.1     Chloride 08/05/2024 101     Bicarbonate 08/05/2024 24     Anion Gap 08/05/2024 15     Urea Nitrogen 08/05/2024 19     Creatinine 08/05/2024 1.07 (H)     eGFR 08/05/2024 62     Calcium 08/05/2024 10.0     Albumin 08/05/2024 4.3     Alkaline Phosphatase 08/05/2024 86     Total Protein 08/05/2024 7.8     AST 08/05/2024 25     Bilirubin, Total 08/05/2024 0.3     ALT 08/05/2024 18     GGT 08/05/2024 25     Hepatitis B Surface AG 08/05/2024 Nonreactive     Hepatitis A  AB- IgM 08/05/2024 Nonreactive     Hepatitis B Core AB; IgM 08/05/2024 Nonreactive     Hepatitis C AB 08/05/2024 Nonreactive     HIV 1/2 Antigen/Antibody* 08/05/2024 Nonreactive     Magnesium 08/05/2024 2.39     Thyroid Stimulating Horm* 08/05/2024 0.97    Multidisciplinary Visit on 06/24/2024   Component Date Value    Ethyl Glucuronide Screen 06/27/2024 Negative     Amphetamine Screen, Urine 06/27/2024 Presumptive Negative     Barbiturate Screen, Urine 06/27/2024 Presumptive Negative     Benzodiazepines Screen, * 06/27/2024 Presumptive Negative     Cannabinoid Screen, Urine 06/27/2024 Presumptive Negative     Cocaine Metabolite Scree* 06/27/2024  Presumptive Negative     Fentanyl Screen, Urine 06/27/2024 Presumptive Negative     Opiate Screen, Urine 06/27/2024 Presumptive Negative     Oxycodone Screen, Urine 06/27/2024 Presumptive Negative     PCP Screen, Urine 06/27/2024 Presumptive Negative     Methadone Screen, Urine 06/27/2024 Presumptive Negative     Clonazepam 06/27/2024 <25     7-Aminoclonazepam 06/27/2024 <25     Alprazolam 06/27/2024 <25     Alpha-Hydroxyalprazolam 06/27/2024 <25     Midazolam 06/27/2024 <25     Alpha-Hydroxymidazolam 06/27/2024 <25     Chlordiazepoxide 06/27/2024 <25     Diazepam 06/27/2024 <25     Nordiazepam 06/27/2024 <25     Temazepam 06/27/2024 <25     Oxazepam 06/27/2024 <25     Lorazepam 06/27/2024 <25     6-Acetylmorphine 06/27/2024 <25     Codeine 06/27/2024 <50     Hydrocodone 06/27/2024 <25     Hydromorphone 06/27/2024 <25     Morphine  06/27/2024 <50     Norhydrocodone 06/27/2024 <25     Noroxycodone 06/27/2024 <25     Oxycodone 06/27/2024 <25     Oxymorphone 06/27/2024 <25    Multidisciplinary Visit on 06/17/2024   Component Date Value    Ethyl Glucuronide Screen 06/20/2024 PresumptivePOS     Amphetamine Screen, Urine 06/20/2024 Presumptive Negative     Barbiturate Screen, Urine 06/20/2024 Presumptive Negative     Benzodiazepines Screen, * 06/20/2024 Presumptive Negative     Cannabinoid Screen, Urine 06/20/2024 Presumptive Negative     Cocaine Metabolite Scree* 06/20/2024 Presumptive Negative     Fentanyl Screen, Urine 06/20/2024 Presumptive Negative     Opiate Screen, Urine 06/20/2024 Presumptive Negative     Oxycodone Screen, Urine 06/20/2024 Presumptive Negative     PCP Screen, Urine 06/20/2024 Presumptive Negative     Methadone Screen, Urine 06/20/2024 Presumptive Negative     Clonazepam 06/20/2024 <25     7-Aminoclonazepam 06/20/2024 <25     Alprazolam 06/20/2024 <25     Alpha-Hydroxyalprazolam 06/20/2024 <25     Midazolam 06/20/2024 <25     Alpha-Hydroxymidazolam 06/20/2024 <25     Chlordiazepoxide 06/20/2024 <25      Diazepam 06/20/2024 <25     Nordiazepam 06/20/2024 <25     Temazepam 06/20/2024 <25     Oxazepam 06/20/2024 <25     Lorazepam 06/20/2024 <25     6-Acetylmorphine 06/20/2024 <25     Codeine 06/20/2024 <50     Hydrocodone 06/20/2024 <25     Hydromorphone 06/20/2024 <25     Morphine  06/20/2024 <50     Norhydrocodone 06/20/2024 <25     Noroxycodone 06/20/2024 <25     Oxycodone 06/20/2024 <25     Oxymorphone 06/20/2024 <25     Ethyl Sulfate,U 06/20/2024 >52122     Ethyl Glucuronide 06/20/2024 >[id number]    Office Visit on 06/12/2024   Component Date Value    Ethyl Glucuronide Screen 06/12/2024 PresumptivePOS     Amphetamine Screen, Urine 06/12/2024 Presumptive Negative     Barbiturate Screen, Urine 06/12/2024 Presumptive Negative     Benzodiazepines Screen, * 06/12/2024 Presumptive Negative     Cannabinoid Screen, Urine 06/12/2024 Presumptive Negative     Cocaine Metabolite Scree* 06/12/2024 Presumptive Negative     Fentanyl Screen, Urine 06/12/2024 Presumptive Negative     Opiate Screen, Urine 06/12/2024 Presumptive Negative     Oxycodone Screen, Urine 06/12/2024 Presumptive Negative     PCP Screen, Urine 06/12/2024 Presumptive Negative     Methadone Screen, Urine 06/12/2024 Presumptive Negative     Clonazepam 06/12/2024 <25     7-Aminoclonazepam 06/12/2024 <25     Alprazolam 06/12/2024 <25     Alpha-Hydroxyalprazolam 06/12/2024 <25     Midazolam 06/12/2024 <25     Alpha-Hydroxymidazolam 06/12/2024 <25     Chlordiazepoxide 06/12/2024 <25     Diazepam 06/12/2024 <25     Nordiazepam 06/12/2024 <25     Temazepam 06/12/2024 <25     Oxazepam 06/12/2024 <25     Lorazepam 06/12/2024 <25     6-Acetylmorphine 06/12/2024 <25     Codeine 06/12/2024 <50     Hydrocodone 06/12/2024 <25     Hydromorphone 06/12/2024 <25     Morphine  06/12/2024 <50     Norhydrocodone 06/12/2024 <25     Noroxycodone 06/12/2024 <25     Oxycodone 06/12/2024 <25     Oxymorphone 06/12/2024 <25     Ethyl Sulfate,U 06/12/2024 3214     Ethyl Glucuronide  06/12/2024 1660        Assessment/Plan   53F with severe AUD, high levels of craving, multiple recent relapses, and worsening anxiety and depression in the context of her father's death a month ago.    Higher level of care for addiction treatment is required to ensure stability in the context of:  - high psychiatric comorbidities  - failed recovery attempts at lower level of care    Suicide Risk Assessment  There are no new risk factors for suicide and imminent risk remains low; therefore, Mary Mayers does not require further risk mitigation.    I provided the mobile crisis number and encouraged calling this, 988 or 911 in case of a mental health crisis, including feeling suicidal or losing touch with reality.

## 2024-08-06 NOTE — PROGRESS NOTES
[facility] Clinical Progress Note      Name: Mary Mayers  MRN: [id number]   YOB: 1970    Date: 08/06/24    Record Review: Yes   OARRS Reviewed: Reviewed OARRS on 8/5/24-OARRS has been reviewed and is consistent with prescribed medications diagnosis.    Summary:  Mary  is a 52yo female presenting to [facility] today for treatment of Alcohol use disorder. She reports consuming a half gallon of wine every 2-3 days since her relapse several weeks ago. Last use Sunday evening at 8pm had 1.5 glasses of wine. She denies CNS withdrawal today. No signs of withdrawal noted. She endorses low mood and anxiety. Remains open to trial of psychotropic medications to target mood and cravings.     IMPRESSION  Alcohol use disorder, severe  Major Depressive disorder  Generalized Anxiety  R/o PTSD    PLAN  Continue PHP  Monitor for CNS withdrawal- CIWA 0  Monitor mood for destabilization  Group psychotherapy  Group Psychoeducation  Start Prozac to target mood- did not  Monday  Resume Naltrexone to target Cravings once LFTs obtained- resumed Monday at home  Recommend 12 step program  Continue home medication as prescribed  Follow up with PCP as scheduled  Rec individual therapy to target anxiety, grief    Treatment Plan/Recommendations: Continue current medications, follow up as scheduled    Diagnosis: Alcohol use disorder severe, MDD, DAMION    Complexity Issues:  Number of diagnoses or management options multiple  Problems effect on treatment and management yes  Risk of complications and/or morbidity or mortality moderate  Coordination of care (e.g. with patient and/or family, social workers, nursing staff, other doctors) >50% of visit    Patient Response to Treatment:  Risks, benefits, side effects, drug to drug interactions and alternatives to treatment were discussed in my usual manner: yes    Patient response to treatment fair  Reason for not reducing medication dose(s) high risk of patient's deterioration    Topics  Discussed:  Nature of diagnosis and/or prognosis, Aspects of aging process and relationship to the current problem, Nature of possible treatment options/drug interaction, Risk of non-treatment, Psychopharmacologic treatment options/possible benefits and risks, Nature of reasons for and possible benefits from psychotherapy, Family and/or situational stressors, Behavioral and/or environmental changes that might help, Medical records reviewed, Communication with facility staff, Referred for psychotherapy, and Forms/reports filled out    Psychotherapy/Counseling: Importance of structure, rec mindfulness    Review with patient: Treatment plan reviewed with the patient.  Medication risks/benefit reviewed with the patient    Subjective   Chief Complaint: Alcohol use disorder, MDD    HPI:   Mary is a 54yo female with PMHx htn, SVT, hearing loss, depression, anxiety is presenting to [facility] today for treatment of alcohol use disorder. She has been consuming half gallon of wine 2-3 days per week.  Last consumption Sunday 8/4 around 8pm. She denies CNS withdrawal. On evaluation this morning, no noted CNS withdrawal. Does report experiencing cravings last evening; attended a 12 step meeting. Having cravings this morning. Patient reports mood has been depressed and anxious; encouraged mindfulness.  Sleep and appetite are adequate. Denies wishes for death or consideration for suicide. CSSRS negative.     Of note resumed Naltrexone last evening. Did not  Prozac  Physical/Somatic Complaints  The patient lists: no physical complaints.    Past Psychiatric History:   Previous therapy: yes with [facility]  Previous psychiatric treatment and medication trials: yes - Zoloft, Hydroxyzine, Gabapentin, Naltrexone, Campral  Previous psychiatric hospitalizations: no  Previous diagnoses: yes - MDD, DAMION, PTSD  Previous suicide attempts: no  History of violence: no  Currently in treatment with n/a.  Education: college graduate  Other pertinent  history: None  Depression screening was performed with standardized tool: Yes - Depression PHQ9 16, DAMION 7 15    Medical History:  Past Medical History:   Diagnosis Date    Anxiety     Depression     Essential (primary) hypertension 10/21/2021    Benign essential hypertension    HL (hearing loss)     Hx of gastric bypass     Other seasonal allergic rhinitis     Seasonal allergies     Current Medications:     Current Outpatient Medications:     FLUoxetine (PROzac) 20 mg capsule, Take 1 capsule (20 mg) by mouth once daily., Disp: 30 capsule, Rfl: 0    folic acid (Folvite) 1 mg tablet, , Disp: , Rfl:     hydroCHLOROthiazide (HYDRODiuril) 12.5 mg tablet, , Disp: , Rfl:     hydrOXYzine HCL (Atarax) 50 mg tablet, , Disp: , Rfl:     losartan (Cozaar) 50 mg tablet, , Disp: , Rfl:     metoprolol succinate XL (Toprol-XL) 100 mg 24 hr tablet, TAKE ONE TABLET BY MOUTH ONE TIME DAILY, Disp: 90 tablet, Rfl: 0    traZODone (Desyrel) 50 mg tablet, , Disp: , Rfl:     Current Facility-Administered Medications:     folic acid (Folvite) tablet 1 mg, 1 mg, oral, Daily, Janina N Aaliyah, APRN-CNP, 1 mg at 08/05/24 1255    multivitamin 1 tablet, 1 tablet, oral, Daily, Janina N Aaliyah, APRN-CNP, 1 tablet at 08/05/24 1256    thiamine (Vitamin B-1) tablet 100 mg, 100 mg, oral, Daily, Janina N Aaliyah, APRN-CNP, 100 mg at 08/05/24 1256    Active Problems:  Patient Active Problem List    Diagnosis Date Noted    DAMION (generalized anxiety disorder) 06/04/2024    Mild episode of recurrent major depressive disorder (CMS-HCC) 06/04/2024    Primary insomnia 06/04/2024    Alcoholism (Multi) 03/18/2024    Abnormal uterine bleeding (AUB) 07/07/2023    Anxiety 07/07/2023    Closed fracture of neck of right fibula 07/07/2023    Dizziness 07/07/2023    Hearing loss 07/07/2023    Paroxysmal SVT (supraventricular tachycardia) (CMS-[facility]) 07/07/2023    Injury of right knee 07/07/2023    Medication side effect 07/07/2023    Mixed conductive and  sensorineural hearing loss of both ears 07/07/2023    Obesity 07/07/2023    PVC's (premature ventricular contractions) 07/07/2023    Syncope 07/07/2023    Tachycardia 07/07/2023    Tinnitus, right ear 07/07/2023    Trichomonas vaginalis infection 07/07/2023    Laxity of abdominal wall 05/05/2022    Laxity of skin 05/05/2022    Excess skin of abdomen 05/04/2022    Alcohol use disorder, severe (Multi) 01/03/2018    Benign essential hypertension 01/03/2018    History of bilateral tubal ligation 01/03/2018    Menorrhagia 04/10/2015    Fracture, foot 05/24/2010     Surgical History:  Past Surgical History:   Procedure Laterality Date    BREAST SURGERY      CHOLECYSTECTOMY  03/26/2018    Cholecystectomy Laparoscopic    COSMETIC SURGERY      OTHER SURGICAL HISTORY  04/08/2020    Gastric bypass surgery    OTHER SURGICAL HISTORY  03/16/2021    Vulvectomy    TUBAL LIGATION  03/26/2018    Tubal Ligation     Allergies:  Allergies   Allergen Reactions    Fish Containing Products Anaphylaxis    Lisinopril Cough    Latex Rash       Review of Systems:    Constitutional: Denies cough, weight changes, sweats or fever  Eyes/Ears/Nose/Throat: wears glasses denies difficulty hearing or pain in ears, denies sore throat or harshness, denies rhinorrhea  Respiratory: Denies shortness of breath   Cardiovascular: hx svt, htn  Gastrointestinal: Denies abdominal pain, nausea or vomiting  Genitourinary: Denies incontinence, pain or frequency with urination  Neurological: Denies numbness, tingling, tremor. Denies hx seizures  Hematologic/Lymphatic: Denies bleeding disorders  Endocrine: denies hx thyroid disease or diabetes  Musculoskeletal: gait steady  Integumentary: skin warm and dry, no rash or lesions noted     Family History:  Family History   Problem Relation Name Age of Onset    Colon cancer Mother Cintia     Alcohol abuse Father Sawyer     Stroke Father Sawyer      Social History:  Social History     Socioeconomic History    Marital status:  Single     Spouse name: Not on file    Number of children: Not on file    Years of education: Not on file    Highest education level: Not on file   Occupational History    Occupation: Nurse   Tobacco Use    Smoking status: Former     Types: Cigarettes    Smokeless tobacco: Current   Vaping Use    Vaping status: Some Days   Substance and Sexual Activity    Alcohol use: Yes     Alcohol/week: 10.0 standard drinks of alcohol     Types: 10 Glasses of wine per week     Comment: 4-5 glasses of wine/day    Drug use: Never    Sexual activity: Not on file   Other Topics Concern    Not on file   Social History Narrative    Not on file     Social Determinants of Health     Financial Resource Strain: Not on File (2023)    Received from [facility]    Financial Resource Strain     Financial Resource Strain: 0   Food Insecurity: Not on File (2023)    Received from [facility]    Food Insecurity     Food: 0   Transportation Needs: Not on File (2023)    Received from [facility]    Transportation Needs     Transportation: 0   Physical Activity: Not on File (2023)    Received from [facility]    Physical Activity     Physical Activity: 0   Stress: Not on File (2023)    Received from [facility]    Stress     Stress: 0   Social Connections: Not on File (2023)    Received from [facility]    Social Connections     Social Connections and Isolation: 0   Intimate Partner Violence: Not on file   Housing Stability: Not on File (2023)    Received from [facility]    Housing Stability     Housin       Psychiatric Review Of Systems  Japonica reports history of depression and anxiety for several years.  Depression is characterized by experiencing low mood, anhedonia, low motivation, increased sleep, decreased energy, isolation. Denies wishes for death or consideration for suicide.  CSSRS negative. Also reports experiencing anxiety characterized by uncontrolled worry, mind racing, restlessness, heart  racing. Reports hx of panic attacks recently characterized by sob, sweats. Denies ever experiencing rosalba of psychosis. Denies hx hospitalization.   Objective        Mental Status Exam:   The patient is alert, pleasant, and cooperative. Well-groomed and maintains good eye contact. Insight and judgment are fair. Oriented to time, place, and person. Speech is goal-directed, coherent, and of normal rate and tone. Thought process is logical. Mood is depressed and affect is congruent. Denies auditory or visual hallucinations. No overt signs of psychosis or rosalba noted. Denies wishes for death or consideration for suicide. Denies thoughts of violence toward others.     PHYSICAL EXAM  GENERAL: Alert and oriented x 3. No acute distress. Well-nourished.  HEENT: Moist mucous membranes. No scleral icterus.   LUNGS:  No accessory muscle use.  EXTREMITIES: No edema. Non-tender.?  SKIN: No rashes or lesions. Warm and Dry  NEUROLOGIC: No tremor noted.  PSYCHIATRIC: Cooperative. Appropriate mood and affect.     Results:    CIWA 0  [id number]            Wt Readings from Last 1 Encounters:   08/05/24 73.9 kg (163 lb)           Total time on date of patient encounter 49    Janina Fischer, APRN-CNP

## 2024-08-06 NOTE — GROUP NOTE
Group Topic: Chemical Dependency - Relapse   Group Date: 8/6/2024  Start Time:  1:00 PM  End Time:  2:00 PM  Facilitators: BARRY Deleon   Department: [facility]    Number of Participants: 2   Treatment Modality: Interpersonal Therapy  Interventions utilized were assignment, exploration, group exercise, and support  Purpose: maladaptive thinking, feelings, irrational fears, insight or knowledge, self-care, and relapse prevention strategies  Comment: 60 minute Activity Group with member of the PHP level of treatment. Patient filled out a worksheet where they recorded the emotional, mental, and physical warning signs of relapse.  They also listed coping skills in all three areas (emotional, mental, and physical).  This was followed by the patient sharing what they had written and receiving feedback.  Patient actively participated in the exercise.        Name: Mary Mayers YOB: 1970   MR: [id number]      Level of Participation: active  Quality of Participation: appropriate/pleasant, attentive, cooperative, and engaged  Mood/Affect: appropriate  Progress: Gaining insight or knowledge  Plan: continue with services

## 2024-08-06 NOTE — GROUP NOTE
Group Topic: Coping Skills   Group Date: 8/6/2024-PHP  Start Time: 11:00 AM  End Time: 12:00 PM  Facilitators: BLANK Cotto   [facility]    Number of Participants: 2   Treatment Modality: Cognitive Behavioral Therapy and Patient-Centered Therapy  Interventions utilized were exploration, patient education, and support  Purpose: coping skills and feelings  Comment: Group Counseling    Group began with a meditative reading about balancing time to include play and pts. were encouraged to reflect and respond. Members were encouraged to provide the group with an update regarding their recovery progress, as well as discuss shared experiences and observations.    Limited sober support. Children are not aware of treatment/drinking. Feels as though everything is coming to a head' in life. Tearful as she is evaluating many tasks and possible changes in her life. Staff reviewed grieving process, peer related. Educated regarding stress sensitive nature pf the illness.   Name: Mary Mayers YOB: 1970   MR: [id number]      Level of Participation: active  Quality of Participation: engaged and motivated  Mood/Affect: depressed, flat, and tearful  Progress: Gaining insight or knowledge  Plan: continue with services

## 2024-08-06 NOTE — GROUP NOTE
Group Topic: Chemical Dependency - Primary Disease   Group Date: 8/6/2024-PHP  Start Time: 10:00 AM  End Time: 11:00 AM  Facilitators: BLANK Cotto   [facility]    Number of Participants: 2   Treatment Modality: Psychoeducation  Interventions utilized were patient education  Purpose: coping skills, insight or knowledge, relapse prevention strategies, and trigger / craving management  Comment: Education- [place]/Pt 2-Neurobiology disease  Pt. 2 of Dr. Baugh's video regarding benefits of recovery, applicable to working a 12 step program and further explaining how recovery changes the brain. Patients viewed second portion of video presentation by Dr. Erum Baugh, addiction psychiatrist, covering this content. The content of the first portion of the video [disease/neurobiology] , previously viewed by several group members, was reviewed through education and group discussion as well. Review of key concepts and application of these held afterwards.   Absent for a portion of session d/t meeting with [facility] medical staff. Voiced understanding of content ad found information beneficial in understanding craving in which she was particularly interested.  Name: Mary Mayers YOB: 1970   MR: [id number]      Level of Participation: active  Quality of Participation: engaged  Mood/Affect: depressed and flat  Progress: Gaining insight or knowledge  Plan: continue with services

## 2024-08-06 NOTE — GROUP NOTE
Group Topic: Dialectical Behavioral Therapy - Mindfulness   Group Date: 8/6/2024-PHP  Start Time:  9:00 AM  End Time: 10:00 AM  Facilitators: BLANK Cotto   [facility]    Number of Participants: 2   Treatment Modality: Dialectical Behavioral Therapy  Interventions utilized were group exercise and patient education  Purpose: coping skills and insight or knowledge  Comment: Mindfulness and Recovery Check in    Group began with a meditative reading about boredom      and pts. were encouraged to reflect and respond.  This was followed by a brief explanation of the potential benefits of mindfulness.  An experiential exercise was completed and afterwards members were asked to share a non-judgmental observation about their experience as well as a daily gratitude. Members shared an update regarding recovery progress.   Self relating to reading topic of boredom and attended AA last night to help prevent boredom. Reports abstinence.   Name: Mary Mayers YOB: 1970   MR: [id number]      Level of Participation: active  Quality of Participation: engaged  Mood/Affect: depressed and flat  Progress: Gaining insight or knowledge  Plan: continue with services

## 2024-08-06 NOTE — PROGRESS NOTES
ARS Nurse Note    Name: Mary Mayers  MRN: [id number]  YOB: 1970    Date: 08/06/24    Reason for Visit:  No chief complaint on file.    Vitals:  BP: 151/79  Heart Rate: 99    Patient Active Problem List   Diagnosis    Abnormal uterine bleeding (AUB)    Alcohol use disorder, severe (Multi)    Anxiety    Benign essential hypertension    Closed fracture of neck of right fibula    Dizziness    Excess skin of abdomen    Hearing loss    History of bilateral tubal ligation    Paroxysmal SVT (supraventricular tachycardia) (CMS-[facility])    Injury of right knee    Laxity of abdominal wall    Laxity of skin    Medication side effect    Mixed conductive and sensorineural hearing loss of both ears    Obesity    PVC's (premature ventricular contractions)    Syncope    Tachycardia    Tinnitus, right ear    Trichomonas vaginalis infection    Fracture, foot    Menorrhagia    Alcoholism (Multi)    DAMION (generalized anxiety disorder)    Mild episode of recurrent major depressive disorder (CMS-[facility])    Primary insomnia       Allergies   Allergen Reactions    Fish Containing Products Anaphylaxis    Lisinopril Cough    Latex Rash       Outpatient Encounter Medications as of 8/6/2024   Medication Sig Dispense Refill    FLUoxetine (PROzac) 20 mg capsule Take 1 capsule (20 mg) by mouth once daily. 30 capsule 0    folic acid (Folvite) 1 mg tablet       hydroCHLOROthiazide (HYDRODiuril) 12.5 mg tablet       hydrOXYzine HCL (Atarax) 50 mg tablet       losartan (Cozaar) 50 mg tablet       metoprolol succinate XL (Toprol-XL) 100 mg 24 hr tablet TAKE ONE TABLET BY MOUTH ONE TIME DAILY 90 tablet 0    traZODone (Desyrel) 50 mg tablet       [DISCONTINUED] gabapentin (Neurontin) 300 mg capsule Take 1 capsule (300 mg) by mouth 3 times a day. 270 capsule 0    [DISCONTINUED] topiramate (Topamax) 25 mg tablet Take 1 tablet (25 mg) by mouth once daily for 7 days, THEN 2 tablets (50 mg) once daily for 7 days, THEN 3 tablets (75 mg) once daily for  7 days, THEN 4 tablets (100 mg) once daily. 282 tablet 1     Facility-Administered Encounter Medications as of 8/6/2024   Medication Dose Route Frequency Provider Last Rate Last Admin    folic acid (Folvite) tablet 1 mg  1 mg oral Daily Janina Fischer, APRN-CNP   1 mg at 08/05/24 1255    multivitamin 1 tablet  1 tablet oral Daily Janina NERIS FischerKACY-CNP   1 tablet at 08/05/24 1256    thiamine (Vitamin B-1) tablet 100 mg  100 mg oral Daily Janina NERIS FischerKACY-CNP   100 mg at 08/05/24 1256       Progress:  Mary arrived on time for PHP. She reports sleeping 5 hours overnight. She reports intense cravings last night rating them as 10/10 with 10 being the most severe. She denies use and reports going to an AA meeting to help navigate cravings. She also reports taking hydroxyzine and naltrexone to help with cravings. She reports keeping busy helps her to refrain from drinking. Provider discussed anxiety and role in addiction and encouraged incorporating mindfulness.    1200: Mary voiced no concerns at this time.     1250: Japonica reports she was able to eat and tolerate lunch well. CIWA 0.   Medication administration: Folic acid 1 mg PO, Thiamine 100 mg PO, multivitamin 1 tab Po per order     1400: Mary voiced no concerns at this time

## 2024-08-07 ENCOUNTER — OFFICE VISIT (OUTPATIENT)
Dept: BEHAVIORAL HEALTH | Facility: CLINIC | Age: 54
End: 2024-08-07
Payer: COMMERCIAL

## 2024-08-07 ENCOUNTER — MULTIDISCIPLINARY VISIT (OUTPATIENT)
Dept: BEHAVIORAL HEALTH | Facility: CLINIC | Age: 54
End: 2024-08-07
Payer: COMMERCIAL

## 2024-08-07 VITALS — HEART RATE: 93 BPM | DIASTOLIC BLOOD PRESSURE: 79 MMHG | SYSTOLIC BLOOD PRESSURE: 135 MMHG

## 2024-08-07 DIAGNOSIS — F33.0 MILD EPISODE OF RECURRENT MAJOR DEPRESSIVE DISORDER (CMS-HCC): ICD-10-CM

## 2024-08-07 DIAGNOSIS — F41.1 GAD (GENERALIZED ANXIETY DISORDER): ICD-10-CM

## 2024-08-07 DIAGNOSIS — F10.20 ALCOHOL USE DISORDER, SEVERE (MULTI): ICD-10-CM

## 2024-08-07 PROCEDURE — 90853 GROUP PSYCHOTHERAPY: CPT

## 2024-08-07 PROCEDURE — 2500000001 HC RX 250 WO HCPCS SELF ADMINISTERED DRUGS (ALT 637 FOR MEDICARE OP): Performed by: NURSE PRACTITIONER

## 2024-08-07 PROCEDURE — S9445 PT EDUCATION NOC INDIVID: HCPCS

## 2024-08-07 PROCEDURE — 99214 OFFICE O/P EST MOD 30 MIN: CPT | Performed by: NURSE PRACTITIONER

## 2024-08-07 RX ADMIN — Medication 1 TABLET: at 12:56

## 2024-08-07 RX ADMIN — THIAMINE HCL TAB 100 MG 100 MG: 100 TAB at 12:57

## 2024-08-07 RX ADMIN — FOLIC ACID 1 MG: 1 TABLET ORAL at 12:56

## 2024-08-07 ASSESSMENT — PAIN SCALES - GENERAL: PAINLEVEL: 0-NO PAIN

## 2024-08-07 NOTE — GROUP NOTE
Group Topic: Chemical Dependency - Relapse   Group Date: 8/7/2024-PHP  Start Time: 11:00 AM  End Time: 12:00 PM  Facilitators: BLANK Cotto   [facility]    Number of Participants: 5   Treatment Modality: Patient-Centered Therapy  Interventions utilized were clarification, exploration, patient education, and support  Purpose: coping skills, feelings, insight or knowledge, and self-care  Comment: Group Counseling-Family focused treatment day  Group began with a meditative reading about communication and pts. were encouraged to reflect and respond. Members were encouraged to provide the group with an update regarding their recovery progress, as well as discuss shared experiences and observations.  No Support persons present in the group today. Family participation was encouraged.   Related to peers on several areas including the family disease and navigating relationship in early recovery. She stresses how much she drank in isolation and how she believes this impacted her family's perception of the drinking.   Name: Mary Mayers YOB: 1970   MR: [id number]      Level of Participation: active  Quality of Participation: engaged, offered feedback, and supportive  Mood/Affect: depressed and flat  Progress: Gaining insight or knowledge  Plan: continue with services

## 2024-08-07 NOTE — PROGRESS NOTES
ARS Clinical Progress Note      Name: Mary Mayers  MRN: [id number]   YOB: 1970    Date: 08/07/24    Record Review: Yes   OARRS Reviewed: Reviewed OARRS on 8/5/24-OARRS has been reviewed and is consistent with prescribed medications diagnosis.    Summary:  Mary  is a 52yo female presenting to [facility] today for treatment of Alcohol use disorder. She reports consuming a half gallon of wine every 2-3 days since her relapse several weeks ago. Last use Sunday 8/4 at 8pm had 1.5 glasses of wine. She denies CNS withdrawal today. No signs of withdrawal noted. She endorses low mood and anxiety. Remains open to trial of psychotropic medications (but has not picked up from pharmacy yet)to target mood and cravings.     IMPRESSION  Alcohol use disorder, severe  Major Depressive disorder  Generalized Anxiety  R/o PTSD    PLAN  Continue PHP  Monitor for CNS withdrawal- CIWA 0  Monitor mood for destabilization  Group psychotherapy  Group Psychoeducation  Family program  Start Prozac to target mood- did not    Recommend 12 step program  Continue home medication as prescribed  Follow up with PCP as scheduled  Rec individual therapy to target anxiety, grief    Treatment Plan/Recommendations: Continue current medications, follow up as scheduled    Diagnosis: Alcohol use disorder severe, MDD, DAMION    Complexity Issues:  Number of diagnoses or management options multiple  Problems effect on treatment and management yes  Risk of complications and/or morbidity or mortality moderate  Coordination of care (e.g. with patient and/or family, social workers, nursing staff, other doctors) >50% of visit    Patient Response to Treatment:  Risks, benefits, side effects, drug to drug interactions and alternatives to treatment were discussed in my usual manner: yes    Patient response to treatment fair  Reason for not reducing medication dose(s) high risk of patient's deterioration    Topics Discussed:  Nature of diagnosis and/or  prognosis, Aspects of aging process and relationship to the current problem, Nature of possible treatment options/drug interaction, Risk of non-treatment, Psychopharmacologic treatment options/possible benefits and risks, Nature of reasons for and possible benefits from psychotherapy, Family and/or situational stressors, Behavioral and/or environmental changes that might help, Medical records reviewed, Communication with facility staff, Referred for psychotherapy, and Forms/reports filled out    Psychotherapy/Counseling: Encouraged 12 step meetings and individual therapy    Review with patient: Treatment plan reviewed with the patient.  Medication risks/benefit reviewed with the patient    Subjective   Chief Complaint: Alcohol use disorder, MDD    HPI:   Mary is a 54yo female with PMHx htn, SVT, hearing loss, depression, anxiety is presenting to [facility] today for treatment of alcohol use disorder. She has been consuming half gallon of wine 2-3 days per week.  Last consumption Sunday 8/4 around 8pm. She denies CNS withdrawal. On evaluation this morning, no noted CNS withdrawal. Does report experiencing cravings; attended a 12 step meeting yesterday and has one planned this evening. . Patient reports mood has been depressed and anxious; has not started Prozac yet.  Sleep and appetite are adequate. Denies wishes for death or consideration for suicide. CSSRS negative.     Of note resumed Naltrexone and Hydroxyzine. Did not  Prozac  Physical/Somatic Complaints  The patient lists: no physical complaints.    Past Psychiatric History:   Previous therapy: yes with [facility]  Previous psychiatric treatment and medication trials: yes - Zoloft, Hydroxyzine, Gabapentin, Naltrexone, Campral  Previous psychiatric hospitalizations: no  Previous diagnoses: yes - MDD, DAMION, PTSD  Previous suicide attempts: no  History of violence: no  Currently in treatment with n/a.  Education: college graduate  Other pertinent history:  None  Depression screening was performed with standardized tool: Yes - Depression PHQ9 16, DAMION 7 15    Medical History:  Past Medical History:   Diagnosis Date    Anxiety     Depression     Essential (primary) hypertension 10/21/2021    Benign essential hypertension    HL (hearing loss)     Hx of gastric bypass     Other seasonal allergic rhinitis     Seasonal allergies     Current Medications:     Current Outpatient Medications:     FLUoxetine (PROzac) 20 mg capsule, Take 1 capsule (20 mg) by mouth once daily., Disp: 30 capsule, Rfl: 0    folic acid (Folvite) 1 mg tablet, , Disp: , Rfl:     hydroCHLOROthiazide (HYDRODiuril) 12.5 mg tablet, , Disp: , Rfl:     hydrOXYzine HCL (Atarax) 50 mg tablet, , Disp: , Rfl:     losartan (Cozaar) 50 mg tablet, , Disp: , Rfl:     metoprolol succinate XL (Toprol-XL) 100 mg 24 hr tablet, TAKE ONE TABLET BY MOUTH ONE TIME DAILY, Disp: 90 tablet, Rfl: 0    traZODone (Desyrel) 50 mg tablet, , Disp: , Rfl:     Current Facility-Administered Medications:     folic acid (Folvite) tablet 1 mg, 1 mg, oral, Daily, Janina N Aaliyah, APRN-CNP, 1 mg at 08/06/24 1305    multivitamin 1 tablet, 1 tablet, oral, Daily, Janina N Aaliyah, APRN-CNP, 1 tablet at 08/06/24 1306    thiamine (Vitamin B-1) tablet 100 mg, 100 mg, oral, Daily, Janina N Aaliyah, APRN-CNP, 100 mg at 08/06/24 1307    Active Problems:  Patient Active Problem List    Diagnosis Date Noted    DAMOIN (generalized anxiety disorder) 06/04/2024    Mild episode of recurrent major depressive disorder (CMS-HCC) 06/04/2024    Primary insomnia 06/04/2024    Alcoholism (Multi) 03/18/2024    Abnormal uterine bleeding (AUB) 07/07/2023    Anxiety 07/07/2023    Closed fracture of neck of right fibula 07/07/2023    Dizziness 07/07/2023    Hearing loss 07/07/2023    Paroxysmal SVT (supraventricular tachycardia) (CMS-[facility]) 07/07/2023    Injury of right knee 07/07/2023    Medication side effect 07/07/2023    Mixed conductive and sensorineural  hearing loss of both ears 07/07/2023    Obesity 07/07/2023    PVC's (premature ventricular contractions) 07/07/2023    Syncope 07/07/2023    Tachycardia 07/07/2023    Tinnitus, right ear 07/07/2023    Trichomonas vaginalis infection 07/07/2023    Laxity of abdominal wall 05/05/2022    Laxity of skin 05/05/2022    Excess skin of abdomen 05/04/2022    Alcohol use disorder, severe (Multi) 01/03/2018    Benign essential hypertension 01/03/2018    History of bilateral tubal ligation 01/03/2018    Menorrhagia 04/10/2015    Fracture, foot 05/24/2010     Surgical History:  Past Surgical History:   Procedure Laterality Date    BREAST SURGERY      CHOLECYSTECTOMY  03/26/2018    Cholecystectomy Laparoscopic    COSMETIC SURGERY      OTHER SURGICAL HISTORY  04/08/2020    Gastric bypass surgery    OTHER SURGICAL HISTORY  03/16/2021    Vulvectomy    TUBAL LIGATION  03/26/2018    Tubal Ligation     Allergies:  Allergies   Allergen Reactions    Fish Containing Products Anaphylaxis    Lisinopril Cough    Latex Rash       Review of Systems:    Constitutional: Denies cough, weight changes, sweats or fever  Eyes/Ears/Nose/Throat: wears glasses denies difficulty hearing or pain in ears, denies sore throat or harshness, denies rhinorrhea  Respiratory: Denies shortness of breath   Cardiovascular: hx svt, htn  Gastrointestinal: Denies abdominal pain, nausea or vomiting  Genitourinary: Denies incontinence, pain or frequency with urination  Neurological: Denies numbness, tingling, tremor. Denies hx seizures  Hematologic/Lymphatic: Denies bleeding disorders  Endocrine: denies hx thyroid disease or diabetes  Musculoskeletal: gait steady  Integumentary: skin warm and dry, no rash or lesions noted     Family History:  Family History   Problem Relation Name Age of Onset    Colon cancer Mother Cintia     Alcohol abuse Father Sawyer     Stroke Father Sawyer      Social History:  Social History     Socioeconomic History    Marital status: Single      Spouse name: Not on file    Number of children: Not on file    Years of education: Not on file    Highest education level: Not on file   Occupational History    Occupation: Nurse   Tobacco Use    Smoking status: Former     Types: Cigarettes    Smokeless tobacco: Current   Vaping Use    Vaping status: Some Days   Substance and Sexual Activity    Alcohol use: Yes     Alcohol/week: 10.0 standard drinks of alcohol     Types: 10 Glasses of wine per week     Comment: 4-5 glasses of wine/day    Drug use: Never    Sexual activity: Not on file   Other Topics Concern    Not on file   Social History Narrative    Not on file     Social Determinants of Health     Financial Resource Strain: Not on File (2023)    Received from [facility]    Financial Resource Strain     Financial Resource Strain: 0   Food Insecurity: Not on File (2023)    Received from [facility]    Food Insecurity     Food: 0   Transportation Needs: Not on File (2023)    Received from [facility]    Transportation Needs     Transportation: 0   Physical Activity: Not on File (2023)    Received from [facility]    Physical Activity     Physical Activity: 0   Stress: Not on File (2023)    Received from [facility]    Stress     Stress: 0   Social Connections: Not on File (2023)    Received from [facility]    Social Connections     Social Connections and Isolation: 0   Intimate Partner Violence: Not on file   Housing Stability: Not on File (2023)    Received from [facility]    Housing Stability     Housin       Psychiatric Review Of Systems  Japonica reports history of depression and anxiety for several years.  Depression is characterized by experiencing low mood, anhedonia, low motivation, increased sleep, decreased energy, isolation. Denies wishes for death or consideration for suicide.  CSSRS negative. Also reports experiencing anxiety characterized by uncontrolled worry, mind racing, restlessness, heart racing. Reports  hx of panic attacks recently characterized by sob, sweats. Denies ever experiencing rosalba of psychosis. Denies hx hospitalization.   Objective        Mental Status Exam:   The patient is alert, pleasant, and cooperative. Well-groomed and maintains good eye contact. Insight and judgment are fair. Oriented to time, place, and person. Speech is goal-directed, coherent, and of normal rate and tone. Thought process is logical. Mood is depressed and affect is congruent. Denies auditory or visual hallucinations. No overt signs of psychosis or rosalba noted. Denies wishes for death or consideration for suicide. Denies thoughts of violence toward others.     PHYSICAL EXAM  GENERAL: Alert and oriented x 3. No acute distress. Well-nourished.  HEENT: Moist mucous membranes. No scleral icterus.   LUNGS:  No accessory muscle use.  SKIN: No rashes or lesions. Warm and Dry  NEUROLOGIC: No tremor noted.  PSYCHIATRIC: Cooperative. Appropriate mood and affect.     Results:    135/79 93            Wt Readings from Last 1 Encounters:   08/05/24 73.9 kg (163 lb)           Total time on date of patient encounter 48    KACY Taveras-CNP

## 2024-08-07 NOTE — PROGRESS NOTES
ARS Nurse Note    Name: Mary Mayers  MRN: [id number]  YOB: 1970    Date: 08/07/24    Reason for Visit:  No chief complaint on file.    Vitals:       Patient Active Problem List   Diagnosis    Abnormal uterine bleeding (AUB)    Alcohol use disorder, severe (Multi)    Anxiety    Benign essential hypertension    Closed fracture of neck of right fibula    Dizziness    Excess skin of abdomen    Hearing loss    History of bilateral tubal ligation    Paroxysmal SVT (supraventricular tachycardia) (CMS-[facility])    Injury of right knee    Laxity of abdominal wall    Laxity of skin    Medication side effect    Mixed conductive and sensorineural hearing loss of both ears    Obesity    PVC's (premature ventricular contractions)    Syncope    Tachycardia    Tinnitus, right ear    Trichomonas vaginalis infection    Fracture, foot    Menorrhagia    Alcoholism (Multi)    DAMION (generalized anxiety disorder)    Mild episode of recurrent major depressive disorder (CMS-[facility])    Primary insomnia       Allergies   Allergen Reactions    Fish Containing Products Anaphylaxis    Lisinopril Cough    Latex Rash       Outpatient Encounter Medications as of 8/7/2024   Medication Sig Dispense Refill    FLUoxetine (PROzac) 20 mg capsule Take 1 capsule (20 mg) by mouth once daily. 30 capsule 0    folic acid (Folvite) 1 mg tablet       hydroCHLOROthiazide (HYDRODiuril) 12.5 mg tablet       hydrOXYzine HCL (Atarax) 50 mg tablet       losartan (Cozaar) 50 mg tablet       metoprolol succinate XL (Toprol-XL) 100 mg 24 hr tablet TAKE ONE TABLET BY MOUTH ONE TIME DAILY 90 tablet 0    traZODone (Desyrel) 50 mg tablet       [DISCONTINUED] gabapentin (Neurontin) 300 mg capsule Take 1 capsule (300 mg) by mouth 3 times a day. 270 capsule 0    [DISCONTINUED] topiramate (Topamax) 25 mg tablet Take 1 tablet (25 mg) by mouth once daily for 7 days, THEN 2 tablets (50 mg) once daily for 7 days, THEN 3 tablets (75 mg) once daily for 7 days, THEN 4 tablets  (100 mg) once daily. 282 tablet 1     Facility-Administered Encounter Medications as of 8/7/2024   Medication Dose Route Frequency Provider Last Rate Last Admin    folic acid (Folvite) tablet 1 mg  1 mg oral Daily Janina ANTON Quigley   1 mg at 08/06/24 1305    multivitamin 1 tablet  1 tablet oral Daily Janina N JULISA FischerN-CNP   1 tablet at 08/06/24 1306    thiamine (Vitamin B-1) tablet 100 mg  100 mg oral Daily Janina KACY Quigley-CNP   100 mg at 08/06/24 1307       Progress:  Mary arrived on time for PHP. She reports sleeping 6 hours overnight. She reports having an unpleasant evening with power being out and she reports some stressors. She went to a meeting last night. She reports cravings 9/10 with 10 being the most severe but denies use overnight. She reports having the most cravings at night and finds the Naltrexone helpful in the evening with the Hydroxyzine. She rates anxiety as 9/10 and depression 8/10 denies homicidal ideations and scored negative on the [place] suicide risk assessment.

## 2024-08-07 NOTE — GROUP NOTE
Group Topic: Dialectical Behavioral Therapy - Mindfulness   Group Date: 8/7/2024  Start Time:  9:00 AM  End Time: 10:00 AM  Facilitators: BARRY Deleon   Department: [facility]    Number of Participants: 5   Treatment Modality: Dialectical Behavioral Therapy  Interventions utilized were exploration, group exercise, and support  Purpose: feelings, communication skills, insight or knowledge, self-care, and trigger / craving management  Comment: Patient participated in a mindfulness meditation exercise and a group check-in.  The purpose and benefits of mindfulness meditation in strengthening relapse prevention skills was explained.  A mindfulness meditation exercise was conducted, and patient provided feedback in the form of personal observations from the exercise.      Name: Mary Mayers YOB: 1970   MR: [id number]      Level of Participation: when cued  Quality of Participation: appropriate/pleasant, attentive, cooperative, and engaged  Mood/Affect: appropriate  Progress: Gaining insight or knowledge  Plan: continue with services

## 2024-08-07 NOTE — GROUP NOTE
Group Topic: Self-Care/Wellness   Group Date: 8/7/2024-PHP  Start Time: 10:00 AM  End Time: 11:00 AM  Facilitators: BLANK Cotto   [facility]    Number of Participants: 5   Treatment Modality: Psychoeducation  Interventions utilized were patient education and support  Purpose: coping skills and trigger / craving management  Comment: Education  HALT-managing craving  Goal of this session is to introduce H.A.L.T. (hungry, angry, lonely, tired) and other self care practices. Handout distributed and reviewed together explaining the acronym, its relevance to recovery and self care practices. Also included was a list of self care practices taken from Seeking Safety Manual. Discussion and disclosure of personal examples throughout.       Name: Mary Mayers YOB: 1970   MR: [id number]      Level of Participation: active  Quality of Participation: engaged  Mood/Affect: depressed and flat  Progress: Gaining insight or knowledge  Plan: continue with services

## 2024-08-07 NOTE — GROUP NOTE
Group Topic: Chemical Dependency - Relapse   Group Date: 8/7/2024  Start Time:  1:00 PM  End Time:  2:00 PM  Facilitators: BARRY Deleon   Department: [facility]    Number of Participants: 2   Treatment Modality: Interpersonal Therapy  Interventions utilized were assignment, exploration, group exercise, and support  Purpose: communication skills, insight or knowledge, and relapse prevention strategies  Comment: 60 minute activity group.  Patient is in the PHP level of treatment.  (Exercise 1) Patient worked on an exercise called “My Value System.”  They completed a worksheet and then shared their answers.  There was a discussion following this about how addiction had negatively impacted what was most important to them.  Also, discussed the role of denial in the compromising of our values.  Patient participated by filling out the worksheet, sharing their answers, and engaging in the discussion.  (Exercise 2) 60 minute activity group.  Patient was from the [facility] level of treatment.  Patient completed a worksheet regarding what values undergird their sobriety efforts.  The worksheet asks what actions the patient has already taken in their sobriety journey, and what the next action step is for them in recovery.  Patient completed the worksheet, and shared their answers.  The patient's answers were then thoroughly discussed with the group.    Name: Mary Mayers YOB: 1970   MR: [id number]      Level of Participation: active  Quality of Participation: appropriate/pleasant, attentive, cooperative, and engaged  Mood/Affect: appropriate  Progress: Gaining insight or knowledge.  Patient completed Exercise 1 for activity group.    Plan: continue with services

## 2024-08-08 ENCOUNTER — OFFICE VISIT (OUTPATIENT)
Dept: BEHAVIORAL HEALTH | Facility: CLINIC | Age: 54
End: 2024-08-08
Payer: COMMERCIAL

## 2024-08-08 ENCOUNTER — MULTIDISCIPLINARY VISIT (OUTPATIENT)
Dept: BEHAVIORAL HEALTH | Facility: CLINIC | Age: 54
End: 2024-08-08
Payer: COMMERCIAL

## 2024-08-08 ENCOUNTER — APPOINTMENT (OUTPATIENT)
Dept: BEHAVIORAL HEALTH | Facility: CLINIC | Age: 54
End: 2024-08-08
Payer: COMMERCIAL

## 2024-08-08 VITALS — SYSTOLIC BLOOD PRESSURE: 136 MMHG | DIASTOLIC BLOOD PRESSURE: 73 MMHG | HEART RATE: 96 BPM

## 2024-08-08 DIAGNOSIS — F33.0 MILD EPISODE OF RECURRENT MAJOR DEPRESSIVE DISORDER (CMS-HCC): ICD-10-CM

## 2024-08-08 DIAGNOSIS — F10.20 ALCOHOL USE DISORDER, SEVERE (MULTI): ICD-10-CM

## 2024-08-08 DIAGNOSIS — F41.1 GAD (GENERALIZED ANXIETY DISORDER): ICD-10-CM

## 2024-08-08 PROCEDURE — 2500000001 HC RX 250 WO HCPCS SELF ADMINISTERED DRUGS (ALT 637 FOR MEDICARE OP): Performed by: NURSE PRACTITIONER

## 2024-08-08 PROCEDURE — 99214 OFFICE O/P EST MOD 30 MIN: CPT | Performed by: NURSE PRACTITIONER

## 2024-08-08 PROCEDURE — S9445 PT EDUCATION NOC INDIVID: HCPCS

## 2024-08-08 PROCEDURE — 90853 GROUP PSYCHOTHERAPY: CPT

## 2024-08-08 ASSESSMENT — PAIN SCALES - GENERAL: PAINLEVEL: 0-NO PAIN

## 2024-08-08 NOTE — GROUP NOTE
Group Topic: Dialectical Behavioral Therapy - Mindfulness   Group Date: 8/8/2024-PHP  Start Time:  9:00 AM  End Time: 10:00 AM  Facilitators: BLANK Cotto   [facility]    Number of Participants: 5   Treatment Modality: Dialectical Behavioral Therapy  Interventions utilized were group exercise and patient education  Purpose: coping skills and self-worth  Comment: Mindfulness and recovery check in  Group began with a meditative reading about        and pts. were encouraged to reflect and respond.  This was followed by a brief explanation of the potential benefits of mindfulness.  An experiential exercise was completed and afterwards members were asked to share a non-judgmental observation about their experience as well as a daily gratitude. Members shared an update regarding recovery progress.     Name: Mary Mayers YOB: 1970   MR: [id number]      Level of Participation: active  Quality of Participation: engaged  Mood/Affect: anxious and depressed  Progress: Gaining insight or knowledge  Plan: continue with services

## 2024-08-08 NOTE — GROUP NOTE
Group Topic: Illness Education   Group Date: 8/8/2024  Start Time:  1:00 PM  End Time:  2:00 PM  Facilitators: Mesha Crowe LPC   Department: [facility]    Number of Participants: 2   Treatment Modality: Skills Training  Interventions utilized were patient education  Purpose: insight or knowledge  Comment: Group members completed the 'Recovery Quiz' which asks basic true/false questions regarding addiction recovery.  Each group member took turns answering questions- questions generated discussions regarding role of nonalcoholic beer in recovery, difference between abstinence and sobriety, as well as role of depression, anxiety and stress in developing an addiction.     Name: Mary Mayers YOB: 1970   MR: [id number]      Level of Participation: active  Quality of Participation: cooperative  Mood/Affect: appropriate  Progress: Gaining insight or knowledge  Plan: continue with services
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
## 2024-08-08 NOTE — PROGRESS NOTES
[facility] Clinical Progress Note      Name: Mary Mayers  MRN: [id number]   YOB: 1970    Date: 08/08/24    Record Review: Yes   OARRS Reviewed: Reviewed OARRS on 8/5/24-OARRS has been reviewed and is consistent with prescribed medications diagnosis.    Summary:  Mary  is a 54yo female presenting to [facility] today for treatment of Alcohol use disorder. She reports consuming a half gallon of wine every 2-3 days since her relapse several weeks ago. Last use Sunday 8/4 at 8pm had 1.5 glasses of wine. She denies CNS withdrawal today. No signs of withdrawal noted. She endorses low mood and anxiety. Remains open to trial of psychotropic medications (but has not picked up Prozac from pharmacy yet) to target mood and cravings.     IMPRESSION  Alcohol use disorder, severe  Major Depressive disorder  Generalized Anxiety  R/o PTSD    PLAN  Continue PHP  Monitor for CNS withdrawal- CIWA 0  Monitor mood for destabilization  Group psychotherapy  Group Psychoeducation  Family program  Start Prozac to target mood- did not    Recommend 12 step program  Continue home medication as prescribed  Follow up with PCP as scheduled  Rec individual therapy to target anxiety, grief    Treatment Plan/Recommendations: Continue current medications, follow up as scheduled    Diagnosis: Alcohol use disorder severe, MDD, DAMION    Complexity Issues:  Number of diagnoses or management options multiple  Problems effect on treatment and management yes  Risk of complications and/or morbidity or mortality moderate  Coordination of care (e.g. with patient and/or family, social workers, nursing staff, other doctors) >50% of visit    Patient Response to Treatment:  Risks, benefits, side effects, drug to drug interactions and alternatives to treatment were discussed in my usual manner: yes    Patient response to treatment fair  Reason for not reducing medication dose(s) high risk of patient's deterioration    Topics Discussed:  Nature of  "diagnosis and/or prognosis, Aspects of aging process and relationship to the current problem, Nature of possible treatment options/drug interaction, Risk of non-treatment, Psychopharmacologic treatment options/possible benefits and risks, Nature of reasons for and possible benefits from psychotherapy, Family and/or situational stressors, Behavioral and/or environmental changes that might help, Medical records reviewed, Communication with facility staff, Referred for psychotherapy, and Forms/reports filled out    Psychotherapy/Counseling: encouraged mindfulness    Review with patient: Treatment plan reviewed with the patient.  Medication risks/benefit reviewed with the patient    Subjective   Chief Complaint: Alcohol use disorder, MDD    HPI:   Mary is a 52yo female with PMHx htn, SVT, hearing loss, depression, anxiety is presenting to [facility] today for treatment of alcohol use disorder. She has been consuming half gallon of wine 2-3 days per week.  Last consumption Sunday 8/4 around 8pm. She denies CNS withdrawal. On evaluation this morning, no noted CNS withdrawal. Does report experiencing cravings; attended two all womens 12 step meetings yesterday really enjoyed them and plans to make them her home group and has one planned again this evening. Patient reports mood has been depressed and anxious; \"Just stuff at home\" did call therapist that was recommended by [facility] staff but still has not started Prozac- pharmacy is out of power. Sleep and appetite are adequate. Denies wishes for death or consideration for suicide. CSSRS negative.     Of note resumed Naltrexone and Hydroxyzine. Did not  Prozac  Physical/Somatic Complaints  The patient lists: no physical complaints.    Past Psychiatric History:   Previous therapy: yes with [facility]  Previous psychiatric treatment and medication trials: yes - Zoloft, Hydroxyzine, Gabapentin, Naltrexone, Campral  Previous psychiatric hospitalizations: no  Previous diagnoses: yes - " MDD, DAMION, PTSD  Previous suicide attempts: no  History of violence: no  Currently in treatment with n/a.  Education: college graduate  Other pertinent history: None  Depression screening was performed with standardized tool: Yes - Depression PHQ9 16, DAMION 7 15    Medical History:  Past Medical History:   Diagnosis Date    Anxiety     Depression     Essential (primary) hypertension 10/21/2021    Benign essential hypertension    HL (hearing loss)     Hx of gastric bypass     Other seasonal allergic rhinitis     Seasonal allergies     Current Medications:     Current Outpatient Medications:     FLUoxetine (PROzac) 20 mg capsule, Take 1 capsule (20 mg) by mouth once daily., Disp: 30 capsule, Rfl: 0    folic acid (Folvite) 1 mg tablet, , Disp: , Rfl:     hydroCHLOROthiazide (HYDRODiuril) 12.5 mg tablet, , Disp: , Rfl:     hydrOXYzine HCL (Atarax) 50 mg tablet, , Disp: , Rfl:     losartan (Cozaar) 50 mg tablet, , Disp: , Rfl:     metoprolol succinate XL (Toprol-XL) 100 mg 24 hr tablet, TAKE ONE TABLET BY MOUTH ONE TIME DAILY, Disp: 90 tablet, Rfl: 0    traZODone (Desyrel) 50 mg tablet, , Disp: , Rfl:     Current Facility-Administered Medications:     folic acid (Folvite) tablet 1 mg, 1 mg, oral, Daily, Janina N Aaliyah, APRN-CNP, 1 mg at 08/07/24 1256    multivitamin 1 tablet, 1 tablet, oral, Daily, Janina N Aaliyah, APRN-CNP, 1 tablet at 08/07/24 1256    thiamine (Vitamin B-1) tablet 100 mg, 100 mg, oral, Daily, Janina N Aaliyah, APRN-CNP, 100 mg at 08/07/24 1257    Active Problems:  Patient Active Problem List    Diagnosis Date Noted    DAMION (generalized anxiety disorder) 06/04/2024    Mild episode of recurrent major depressive disorder (CMS-HCC) 06/04/2024    Primary insomnia 06/04/2024    Alcoholism (Multi) 03/18/2024    Abnormal uterine bleeding (AUB) 07/07/2023    Anxiety 07/07/2023    Closed fracture of neck of right fibula 07/07/2023    Dizziness 07/07/2023    Hearing loss 07/07/2023    Paroxysmal SVT  (supraventricular tachycardia) (CMS-HCC) 07/07/2023    Injury of right knee 07/07/2023    Medication side effect 07/07/2023    Mixed conductive and sensorineural hearing loss of both ears 07/07/2023    Obesity 07/07/2023    PVC's (premature ventricular contractions) 07/07/2023    Syncope 07/07/2023    Tachycardia 07/07/2023    Tinnitus, right ear 07/07/2023    Trichomonas vaginalis infection 07/07/2023    Laxity of abdominal wall 05/05/2022    Laxity of skin 05/05/2022    Excess skin of abdomen 05/04/2022    Alcohol use disorder, severe (Multi) 01/03/2018    Benign essential hypertension 01/03/2018    History of bilateral tubal ligation 01/03/2018    Menorrhagia 04/10/2015    Fracture, foot 05/24/2010     Surgical History:  Past Surgical History:   Procedure Laterality Date    BREAST SURGERY      CHOLECYSTECTOMY  03/26/2018    Cholecystectomy Laparoscopic    COSMETIC SURGERY      OTHER SURGICAL HISTORY  04/08/2020    Gastric bypass surgery    OTHER SURGICAL HISTORY  03/16/2021    Vulvectomy    TUBAL LIGATION  03/26/2018    Tubal Ligation     Allergies:  Allergies   Allergen Reactions    Fish Containing Products Anaphylaxis    Lisinopril Cough    Latex Rash       Review of Systems:    Constitutional: Denies cough, weight changes, sweats or fever  Eyes/Ears/Nose/Throat: wears glasses denies difficulty hearing or pain in ears, denies sore throat or harshness, denies rhinorrhea  Respiratory: Denies shortness of breath   Cardiovascular: hx svt, htn  Gastrointestinal: Denies abdominal pain, nausea or vomiting  Genitourinary: Denies incontinence, pain or frequency with urination  Neurological: Denies numbness, tingling, tremor. Denies hx seizures  Hematologic/Lymphatic: Denies bleeding disorders  Endocrine: denies hx thyroid disease or diabetes  Musculoskeletal: gait steady  Integumentary: skin warm and dry, no rash or lesions noted     Family History:  Family History   Problem Relation Name Age of Onset    Colon cancer  Mother Cintia     Alcohol abuse Father Sawyer     Stroke Father Sawyer      Social History:  Social History     Socioeconomic History    Marital status: Single     Spouse name: Not on file    Number of children: Not on file    Years of education: Not on file    Highest education level: Not on file   Occupational History    Occupation: Nurse   Tobacco Use    Smoking status: Former     Types: Cigarettes    Smokeless tobacco: Current   Vaping Use    Vaping status: Some Days   Substance and Sexual Activity    Alcohol use: Yes     Alcohol/week: 10.0 standard drinks of alcohol     Types: 10 Glasses of wine per week     Comment: 4-5 glasses of wine/day    Drug use: Never    Sexual activity: Not on file   Other Topics Concern    Not on file   Social History Narrative    Not on file     Social Determinants of Health     Financial Resource Strain: Not on File (2023)    Received from [facility]    Financial Resource Strain     Financial Resource Strain: 0   Food Insecurity: Not on File (2023)    Received from [facility]    Food Insecurity     Food: 0   Transportation Needs: Not on File (2023)    Received from [facility]    Transportation Needs     Transportation: 0   Physical Activity: Not on File (2023)    Received from [facility]    Physical Activity     Physical Activity: 0   Stress: Not on File (2023)    Received from [facility]    Stress     Stress: 0   Social Connections: Not on File (2023)    Received from [facility]    Social Connections     Social Connections and Isolation: 0   Intimate Partner Violence: Not on file   Housing Stability: Not on File (2023)    Received from [facility]    Housing Stability     Housin       Psychiatric Review Of Systems  J[place] reports history of depression and anxiety for several years.  Depression is characterized by experiencing low mood, anhedonia, low motivation, increased sleep, decreased energy, isolation. Denies wishes for death or  consideration for suicide.  CSSRS negative. Also reports experiencing anxiety characterized by uncontrolled worry, mind racing, restlessness, heart racing. Reports hx of panic attacks recently characterized by sob, sweats. Denies ever experiencing rosalba of psychosis. Denies hx hospitalization.   Objective        Mental Status Exam:   The patient is alert, pleasant, and cooperative. Well-groomed and maintains good eye contact. Insight and judgment are fair. Oriented to time, place, and person. Speech is goal-directed, coherent, and of normal rate and tone. Thought process is logical. Mood is depressed and affect is congruent. Denies auditory or visual hallucinations. No overt signs of psychosis or rosalba noted. Denies wishes for death or consideration for suicide. Denies thoughts of violence toward others.     PHYSICAL EXAM  GENERAL: Alert and oriented x 3. No acute distress. Well-nourished.  HEENT: Moist mucous membranes. No scleral icterus.   LUNGS:  No accessory muscle use.  SKIN: No rashes or lesions. Warm and Dry  NEUROLOGIC: No tremor noted.  PSYCHIATRIC: Cooperative. Appropriate mood and affect.     Results:    136/73 96            Wt Readings from Last 1 Encounters:   08/05/24 73.9 kg (163 lb)           Total time on date of patient encounter 37    Janina Fischer, APRN-CNP

## 2024-08-09 ENCOUNTER — MULTIDISCIPLINARY VISIT (OUTPATIENT)
Dept: BEHAVIORAL HEALTH | Facility: CLINIC | Age: 54
End: 2024-08-09
Payer: COMMERCIAL

## 2024-08-09 ENCOUNTER — OFFICE VISIT (OUTPATIENT)
Dept: BEHAVIORAL HEALTH | Facility: CLINIC | Age: 54
End: 2024-08-09
Payer: COMMERCIAL

## 2024-08-09 VITALS — SYSTOLIC BLOOD PRESSURE: 126 MMHG | DIASTOLIC BLOOD PRESSURE: 71 MMHG | HEART RATE: 85 BPM

## 2024-08-09 DIAGNOSIS — F10.20 ALCOHOL USE DISORDER, SEVERE (MULTI): ICD-10-CM

## 2024-08-09 DIAGNOSIS — F41.1 GAD (GENERALIZED ANXIETY DISORDER): ICD-10-CM

## 2024-08-09 DIAGNOSIS — F10.20 ALCOHOL USE DISORDER, SEVERE, DEPENDENCE (MULTI): ICD-10-CM

## 2024-08-09 PROCEDURE — 99214 OFFICE O/P EST MOD 30 MIN: CPT | Performed by: NURSE PRACTITIONER

## 2024-08-09 PROCEDURE — 80346 BENZODIAZEPINES1-12: CPT | Performed by: NURSE PRACTITIONER

## 2024-08-09 PROCEDURE — 90853 GROUP PSYCHOTHERAPY: CPT

## 2024-08-09 PROCEDURE — 80365 DRUG SCREENING OXYCODONE: CPT | Performed by: NURSE PRACTITIONER

## 2024-08-09 PROCEDURE — 2500000001 HC RX 250 WO HCPCS SELF ADMINISTERED DRUGS (ALT 637 FOR MEDICARE OP): Performed by: NURSE PRACTITIONER

## 2024-08-09 PROCEDURE — S9445 PT EDUCATION NOC INDIVID: HCPCS

## 2024-08-09 PROCEDURE — 80307 DRUG TEST PRSMV CHEM ANLYZR: CPT | Performed by: NURSE PRACTITIONER

## 2024-08-09 ASSESSMENT — PAIN SCALES - GENERAL: PAINLEVEL: 0-NO PAIN

## 2024-08-09 NOTE — PROGRESS NOTES
ARS Clinical Progress Note      Name: Mary Mayers  MRN: [id number]   YOB: 1970    Date: 08/09/24    Record Review: Yes   OARRS Reviewed: Reviewed OARRS on 8/5/24-OARRS has been reviewed and is consistent with prescribed medications diagnosis.    Summary:  Mary  is a 54yo female presenting to [facility] today for treatment of Alcohol use disorder. She reports consuming a half gallon of wine every 2-3 days since her relapse several weeks ago. Last use Sunday 8/4 at 8pm had 1.5 glasses of wine. She denies CNS withdrawal today. No signs of withdrawal noted. Attending 12 step meetings daily. She endorses low mood and anxiety. Remains open to trial of psychotropic medications (but has not picked up Prozac from pharmacy yet) to target mood and cravings.     IMPRESSION  Alcohol use disorder, severe  Major Depressive disorder  Generalized Anxiety  R/o PTSD    PLAN  Continue PHP  Monitor for CNS withdrawal- CIWA 0  Monitor mood for destabilization  Group psychotherapy  Group Psychoeducation  Family program  Start Prozac to target mood- did not    Recommend 12 step program  Continue home medication as prescribed  Follow up with PCP as scheduled  Rec individual therapy to target anxiety, grief    Treatment Plan/Recommendations: Continue current medications, follow up as scheduled    Diagnosis: Alcohol use disorder severe, MDD, DAMION    Complexity Issues:  Number of diagnoses or management options multiple  Problems effect on treatment and management yes  Risk of complications and/or morbidity or mortality moderate  Coordination of care (e.g. with patient and/or family, social workers, nursing staff, other doctors) >50% of visit    Patient Response to Treatment:  Risks, benefits, side effects, drug to drug interactions and alternatives to treatment were discussed in my usual manner: yes    Patient response to treatment fair  Reason for not reducing medication dose(s) high risk of patient's  deterioration    Topics Discussed:  Nature of diagnosis and/or prognosis, Aspects of aging process and relationship to the current problem, Nature of possible treatment options/drug interaction, Risk of non-treatment, Psychopharmacologic treatment options/possible benefits and risks, Nature of reasons for and possible benefits from psychotherapy, Family and/or situational stressors, Behavioral and/or environmental changes that might help, Medical records reviewed, Communication with facility staff, Referred for psychotherapy, and Forms/reports filled out    Psychotherapy/Counseling: encouraged weekend planning    Review with patient: Treatment plan reviewed with the patient.  Medication risks/benefit reviewed with the patient    Subjective   Chief Complaint: Alcohol use disorder, MDD    HPI:   Mary is a 52yo female with PMHx htn, SVT, hearing loss, depression, anxiety is presenting to [facility] today for treatment of alcohol use disorder. She has been consuming half gallon of wine 2-3 days per week.  Last consumption Sunday 8/4 around 8pm. She denies CNS withdrawal. On evaluation this morning, no noted CNS withdrawal. Does report experiencing cravings; attended 12 step meeting last evening and called an AA contact last evening. Plans to attend meetings over the weekend but no other solid plans. Patient reports mood has been depressed and anxious;  but still has not started Prozac- pharmacy is out of power. Sleep and appetite are adequate. Denies wishes for death or consideration for suicide. CSSRS negative.     Of note resumed Naltrexone and Hydroxyzine. Did not  Prozac  Physical/Somatic Complaints  The patient lists: no physical complaints.    Past Psychiatric History:   Previous therapy: yes with [facility]  Previous psychiatric treatment and medication trials: yes - Zoloft, Hydroxyzine, Gabapentin, Naltrexone, Campral  Previous psychiatric hospitalizations: no  Previous diagnoses: yes - MDD, DAMION, PTSD  Previous  suicide attempts: no  History of violence: no  Currently in treatment with n/a.  Education: college graduate  Other pertinent history: None  Depression screening was performed with standardized tool: Yes - Depression PHQ9 16, DAMION 7 15    Medical History:  Past Medical History:   Diagnosis Date    Anxiety     Depression     Essential (primary) hypertension 10/21/2021    Benign essential hypertension    HL (hearing loss)     Hx of gastric bypass     Other seasonal allergic rhinitis     Seasonal allergies     Current Medications:     Current Outpatient Medications:     FLUoxetine (PROzac) 20 mg capsule, Take 1 capsule (20 mg) by mouth once daily., Disp: 30 capsule, Rfl: 0    folic acid (Folvite) 1 mg tablet, , Disp: , Rfl:     hydroCHLOROthiazide (HYDRODiuril) 12.5 mg tablet, , Disp: , Rfl:     hydrOXYzine HCL (Atarax) 50 mg tablet, , Disp: , Rfl:     losartan (Cozaar) 50 mg tablet, , Disp: , Rfl:     metoprolol succinate XL (Toprol-XL) 100 mg 24 hr tablet, TAKE ONE TABLET BY MOUTH ONE TIME DAILY, Disp: 90 tablet, Rfl: 0    traZODone (Desyrel) 50 mg tablet, , Disp: , Rfl:     Current Facility-Administered Medications:     folic acid (Folvite) tablet 1 mg, 1 mg, oral, Daily, Janina N Aaliyah, APRN-CNP, 1 mg at 08/08/24 1238    multivitamin 1 tablet, 1 tablet, oral, Daily, Janina N Aaliyah, APRN-CNP, 1 tablet at 08/08/24 1240    thiamine (Vitamin B-1) tablet 100 mg, 100 mg, oral, Daily, Janina N Aaliyah, APRN-CNP, 100 mg at 08/08/24 1240    Active Problems:  Patient Active Problem List    Diagnosis Date Noted    DAMION (generalized anxiety disorder) 06/04/2024    Mild episode of recurrent major depressive disorder (CMS-HCC) 06/04/2024    Primary insomnia 06/04/2024    Alcoholism (Multi) 03/18/2024    Abnormal uterine bleeding (AUB) 07/07/2023    Anxiety 07/07/2023    Closed fracture of neck of right fibula 07/07/2023    Dizziness 07/07/2023    Hearing loss 07/07/2023    Paroxysmal SVT (supraventricular tachycardia)  (CMS-[facility]) 07/07/2023    Injury of right knee 07/07/2023    Medication side effect 07/07/2023    Mixed conductive and sensorineural hearing loss of both ears 07/07/2023    Obesity 07/07/2023    PVC's (premature ventricular contractions) 07/07/2023    Syncope 07/07/2023    Tachycardia 07/07/2023    Tinnitus, right ear 07/07/2023    Trichomonas vaginalis infection 07/07/2023    Laxity of abdominal wall 05/05/2022    Laxity of skin 05/05/2022    Excess skin of abdomen 05/04/2022    Alcohol use disorder, severe (Multi) 01/03/2018    Benign essential hypertension 01/03/2018    History of bilateral tubal ligation 01/03/2018    Menorrhagia 04/10/2015    Fracture, foot 05/24/2010     Surgical History:  Past Surgical History:   Procedure Laterality Date    BREAST SURGERY      CHOLECYSTECTOMY  03/26/2018    Cholecystectomy Laparoscopic    COSMETIC SURGERY      OTHER SURGICAL HISTORY  04/08/2020    Gastric bypass surgery    OTHER SURGICAL HISTORY  03/16/2021    Vulvectomy    TUBAL LIGATION  03/26/2018    Tubal Ligation     Allergies:  Allergies   Allergen Reactions    Fish Containing Products Anaphylaxis    Lisinopril Cough    Latex Rash       Review of Systems:    Constitutional: Denies cough, weight changes, sweats or fever  Eyes/Ears/Nose/Throat: wears glasses denies difficulty hearing or pain in ears, denies sore throat or harshness, denies rhinorrhea  Respiratory: Denies shortness of breath   Cardiovascular: hx svt, htn  Gastrointestinal: Denies abdominal pain, nausea or vomiting  Genitourinary: Denies incontinence, pain or frequency with urination  Neurological: Denies numbness, tingling, tremor. Denies hx seizures  Hematologic/Lymphatic: Denies bleeding disorders  Endocrine: denies hx thyroid disease or diabetes  Musculoskeletal: gait steady  Integumentary: skin warm and dry, no rash or lesions noted     Family History:  Family History   Problem Relation Name Age of Onset    Colon cancer Mother Cintia     Alcohol abuse  Father Sawyer     Stroke Father Sawyer      Social History:  Social History     Socioeconomic History    Marital status: Single     Spouse name: Not on file    Number of children: Not on file    Years of education: Not on file    Highest education level: Not on file   Occupational History    Occupation: Nurse   Tobacco Use    Smoking status: Former     Types: Cigarettes    Smokeless tobacco: Current   Vaping Use    Vaping status: Some Days   Substance and Sexual Activity    Alcohol use: Yes     Alcohol/week: 10.0 standard drinks of alcohol     Types: 10 Glasses of wine per week     Comment: 4-5 glasses of wine/day    Drug use: Never    Sexual activity: Not on file   Other Topics Concern    Not on file   Social History Narrative    Not on file     Social Determinants of Health     Financial Resource Strain: Not on File (2023)    Received from [facility]    Financial Resource Strain     Financial Resource Strain: 0   Food Insecurity: Not on File (2023)    Received from [facility]    Food Insecurity     Food: 0   Transportation Needs: Not on File (2023)    Received from [facility]    Transportation Needs     Transportation: 0   Physical Activity: Not on File (2023)    Received from [facility]    Physical Activity     Physical Activity: 0   Stress: Not on File (2023)    Received from [facility]    Stress     Stress: 0   Social Connections: Not on File (2023)    Received from [facility]    Social Connections     Social Connections and Isolation: 0   Intimate Partner Violence: Not on file   Housing Stability: Not on File (2023)    Received from [facility]    Housing Stability     Housin       Psychiatric Review Of Systems  Japonica reports history of depression and anxiety for several years.  Depression is characterized by experiencing low mood, anhedonia, low motivation, increased sleep, decreased energy, isolation. Denies wishes for death or consideration for suicide.  CSSRS  negative. Also reports experiencing anxiety characterized by uncontrolled worry, mind racing, restlessness, heart racing. Reports hx of panic attacks recently characterized by sob, sweats. Denies ever experiencing rosalba of psychosis. Denies hx hospitalization.   Objective        Mental Status Exam:   The patient is alert, pleasant, and cooperative. Well-groomed and maintains good eye contact. Insight and judgment are fair. Oriented to time, place, and person. Speech is goal-directed, coherent, and of normal rate and tone. Thought process is logical. Mood is depressed and affect is congruent. Denies auditory or visual hallucinations. No overt signs of psychosis or rosalba noted. Denies wishes for death or consideration for suicide. Denies thoughts of violence toward others.     PHYSICAL EXAM  GENERAL: Alert and oriented x 3. No acute distress. Well-nourished.  HEENT: Moist mucous membranes. No scleral icterus.   LUNGS:  No accessory muscle use.  SKIN: No rashes or lesions. Warm and Dry  NEUROLOGIC: No tremor noted.  PSYCHIATRIC: Cooperative. Appropriate mood and affect.     Results:    126/71 85            Wt Readings from Last 1 Encounters:   08/05/24 73.9 kg (163 lb)           Total time on date of patient encounter 33    KACY Taveras-CNP

## 2024-08-09 NOTE — GROUP NOTE
Group Topic: Chemical Dependency - AA   Group Date: 8/8/2024-PHP  Start Time: 10:00 AM  End Time: 11:00 AM  Facilitators: BLANK Cotto   [facility]    Number of Participants: 5   Treatment Modality: Psychoeducation  Interventions utilized were group exercise, patient education, and support  Purpose: coping skills and insight or knowledge  Comment: 12 step meeting orientation  Goal of this session is to increase awareness of 12 step programs and facilitate a 'warm handoff' in hopes to increase connection to AA/NA. Educate about and normalize recovery process. A volunteer shared about their personal recovery and experiences within a 12 step program. Components of this were reviewed. Members were given opportunity to ask questions.   Self related.   Name: Mary Mayers YOB: 1970   MR: [id number]      Level of Participation: active  Quality of Participation: engaged  Mood/Affect: depressed  Progress: Gaining insight or knowledge  Plan: continue with services

## 2024-08-09 NOTE — PROGRESS NOTES
ARS Nurse Note    Name: Mary Mayers  MRN: [id number]  YOB: 1970    Date: 08/09/24    Reason for Visit:  No chief complaint on file.    Vitals:  BP: 126/71  Heart Rate: 85    Patient Active Problem List   Diagnosis    Abnormal uterine bleeding (AUB)    Alcohol use disorder, severe (Multi)    Anxiety    Benign essential hypertension    Closed fracture of neck of right fibula    Dizziness    Excess skin of abdomen    Hearing loss    History of bilateral tubal ligation    Paroxysmal SVT (supraventricular tachycardia) (CMS-[facility])    Injury of right knee    Laxity of abdominal wall    Laxity of skin    Medication side effect    Mixed conductive and sensorineural hearing loss of both ears    Obesity    PVC's (premature ventricular contractions)    Syncope    Tachycardia    Tinnitus, right ear    Trichomonas vaginalis infection    Fracture, foot    Menorrhagia    Alcoholism (Multi)    DAMION (generalized anxiety disorder)    Mild episode of recurrent major depressive disorder (CMS-[facility])    Primary insomnia       Allergies   Allergen Reactions    Fish Containing Products Anaphylaxis    Lisinopril Cough    Latex Rash       Outpatient Encounter Medications as of 8/9/2024   Medication Sig Dispense Refill    FLUoxetine (PROzac) 20 mg capsule Take 1 capsule (20 mg) by mouth once daily. 30 capsule 0    folic acid (Folvite) 1 mg tablet       hydroCHLOROthiazide (HYDRODiuril) 12.5 mg tablet       hydrOXYzine HCL (Atarax) 50 mg tablet       losartan (Cozaar) 50 mg tablet       metoprolol succinate XL (Toprol-XL) 100 mg 24 hr tablet TAKE ONE TABLET BY MOUTH ONE TIME DAILY 90 tablet 0    traZODone (Desyrel) 50 mg tablet       [DISCONTINUED] gabapentin (Neurontin) 300 mg capsule Take 1 capsule (300 mg) by mouth 3 times a day. 270 capsule 0    [DISCONTINUED] topiramate (Topamax) 25 mg tablet Take 1 tablet (25 mg) by mouth once daily for 7 days, THEN 2 tablets (50 mg) once daily for 7 days, THEN 3 tablets (75 mg) once daily for  7 days, THEN 4 tablets (100 mg) once daily. 282 tablet 1     Facility-Administered Encounter Medications as of 8/9/2024   Medication Dose Route Frequency Provider Last Rate Last Admin    folic acid (Folvite) tablet 1 mg  1 mg oral Daily Janina Fischer APRN-CNP   1 mg at 08/08/24 1238    multivitamin 1 tablet  1 tablet oral Daily Janina Fischer APRN-CNP   1 tablet at 08/08/24 1240    thiamine (Vitamin B-1) tablet 100 mg  100 mg oral Daily Janina Fischer APRN-CNP   100 mg at 08/08/24 1240       Progress:  Mary arrived on time for PHP. She reports sleeping 4 hours overnight. She rates cravings for alcohol as 8/10 with 10 being the most severe and denies use. She rates anxiety and depression as 8/10 with 10 being the most severe. She denies homicidal ideations and scored negative on the [place] suicide risk assessment. She reports going to a meeting last night. She reports reaching out to a lady who she met at a meeting and that it went well. She states she has no concrete plans for the weekend but plans to attend a meeting.     1220: medication administration: Folic Acid 1 mg PO, Thiamine 100 mg PO, Multivitamin 1 tablet PO per order

## 2024-08-09 NOTE — GROUP NOTE
Group Topic: Chemical Dependency - Relapse   Group Date: 8/9/2024  Start Time: 11:00 AM  End Time: 12:00 PM  Facilitators: BARRY Deleon   Department: [facility]    Number of Participants: 2   Treatment Modality: Cognitive Behavioral Therapy and Dialectical Behavioral Therapy  Interventions utilized were exploration, reality testing, and support  Purpose: feelings, communication skills, and trigger / craving management  Comment: Group therapy with members of the PHP level of treatment.  Started the group with a reading from “Body, Mind, and Spirit.”  The reading talked about family relationships in recovery.  Group members shared what they could relate to from today's reading.  Group members then provided recovery updates including any recent and/or current sobriety threats.       Name: Mary Mayers YOB: 1970   MR: [id number]      Level of Participation: active  Quality of Participation: appropriate/pleasant, attentive, cooperative, and engaged  Mood/Affect: appropriate  Progress: Gaining insight or knowledge.  Patient was active and engaged in today's group therapy session.  Patient shared about the recent passing of her father, and some of her family dynamics.  The group was very supportive of her.  Patient reports current sobriety and AA meeting attendance.    Plan: continue with services

## 2024-08-09 NOTE — GROUP NOTE
Group Topic: Coping Skills   Group Date: 8/8/2024-PHP  Start Time: 11:00 AM  End Time: 12:00 PM  Facilitators: BLANK Cotto   [facility]    Number of Participants: 5   Treatment Modality: Patient-Centered Therapy  Interventions utilized were exploration, patient education, and support  Purpose: coping skills, feelings, self-care, and relapse prevention strategies  Comment: Group Counseling  Group began with a meditative reading and pts. were encouraged to reflect and respond. Members were encouraged to provide the group with an update regarding their recovery progress, as well as discuss shared experiences and observations.     Shared details about attendance at 12 step meetings, family response to disease and plans for self care as she navigates early recovery and feeling overwhelmed by multiple life challenges.   Name: Mary Mayers YOB: 1970   MR: [id number]      Level of Participation: active  Quality of Participation: attentive, engaged, initiates communication, offered feedback, and supportive  Mood/Affect: depressed and flat  Progress: Gaining insight or knowledge  Plan: continue with services

## 2024-08-09 NOTE — GROUP NOTE
Group Topic: Chemical Dependency - Primary Disease   Group Date: 8/9/2024  Start Time: 10:00 AM  End Time: 11:00 AM  Facilitators: BARRY Deleon   Department: [facility]    Number of Participants: 2   Treatment Modality: Psychoeducation  Interventions utilized were patient education  Purpose: coping skills, insight or knowledge, and trigger / craving management  Comment: Patient watched the Father Braxton video “One Day at a Time.”  The video talked about some AA slogans and how they can help recovering people to stay sober.  This was followed by a discussion about the various slogans, particularly “One Day at a Time” and the importance of living in the present.  Patient watched and participated in the discussion that followed.        Name: Mary Mayers YOB: 1970   MR: [id number]      Level of Participation: active  Quality of Participation: appropriate/pleasant, attentive, cooperative, and engaged  Mood/Affect: appropriate  Progress: Gaining insight or knowledge  Plan: continue with services

## 2024-08-09 NOTE — GROUP NOTE
Group Topic: Self-Care/Wellness   Group Date: 8/9/2024-PHP  Start Time:  1:00 PM  End Time:  2:00 PM  Facilitators: BLANK Cotto   [facility]    Number of Participants: 2   Treatment Modality: Patient-Centered Therapy  Interventions utilized were assignment, clarification, group exercise, and patient education  Purpose: coping skills and trigger / craving management  Comment: Weekend Planning/Relapse Prevention    Weekend Plan Review  Pts. were asked to complete a weekend schedule with respect to the importance of developing structure, routine and integrating recovery oriented behaviors into development of a sober lifestyle.  Members then reviewed their plan aloud with the group. Pts. encouraged to identify high risk situations and alternatives. Topics of discussion focused mostly on general education about addiction and group review/feedback/brainstorming regarding their plans.   Reviewed plan and able to identify specific coping skills. Has meetings scheduled daily. Motivated for continued abstinence.   Name: Mary Mayers YOB: 1970   MR: [id number]      Level of Participation: active  Quality of Participation: distractible and engaged  Mood/Affect: appropriate  Progress: Gaining insight or knowledge  Plan: continue with services     78F with no significant PMH who presents to the [facility] ED from [facility] for 1 day history of dizziness.

## 2024-08-09 NOTE — GROUP NOTE
Group Topic: Dialectical Behavioral Therapy - Mindfulness   Group Date: 8/9/2024 -PHP  Start Time:  9:00 AM  End Time: 10:00 AM  Facilitators: BLANK Cotto   [facility]    Number of Participants: 2   Treatment Modality: Dialectical Behavioral Therapy  Interventions utilized were group exercise, patient education, and support  Purpose: coping skills and relapse prevention strategies  Comment: Mindfulness and Recovery update    Group began with a meditative reading about doing the work of recovery and pts. were encouraged to reflect and respond.  This was followed by a brief explanation of the potential benefits of mindfulness.  An experiential exercise was completed and afterwards members were asked to share a non-judgmental observation about their experience as well as a daily gratitude. Members shared an update regarding recovery progress.   Reviewed behaviors she completed yesterday to support ongoing abstinence-making phone calls, establishing support system, routine and structure, meeting.   Name: Mary Mayers YOB: 1970   MR: [id number]      Level of Participation: active  Quality of Participation: engaged  Mood/Affect: appropriate  Progress: Gaining insight or knowledge  Plan: continue with services

## 2024-08-12 ENCOUNTER — TELEMEDICINE (OUTPATIENT)
Dept: BEHAVIORAL HEALTH | Facility: CLINIC | Age: 54
End: 2024-08-12
Payer: COMMERCIAL

## 2024-08-12 ENCOUNTER — MULTIDISCIPLINARY VISIT (OUTPATIENT)
Dept: BEHAVIORAL HEALTH | Facility: CLINIC | Age: 54
End: 2024-08-12
Payer: COMMERCIAL

## 2024-08-12 VITALS — DIASTOLIC BLOOD PRESSURE: 65 MMHG | HEART RATE: 76 BPM | SYSTOLIC BLOOD PRESSURE: 117 MMHG

## 2024-08-12 DIAGNOSIS — F41.1 GAD (GENERALIZED ANXIETY DISORDER): ICD-10-CM

## 2024-08-12 DIAGNOSIS — F10.20 ALCOHOL USE DISORDER, SEVERE (MULTI): Primary | ICD-10-CM

## 2024-08-12 DIAGNOSIS — F10.20 ALCOHOL USE DISORDER, SEVERE (MULTI): ICD-10-CM

## 2024-08-12 DIAGNOSIS — F33.0 MILD EPISODE OF RECURRENT MAJOR DEPRESSIVE DISORDER (CMS-HCC): ICD-10-CM

## 2024-08-12 PROCEDURE — 99214 OFFICE O/P EST MOD 30 MIN: CPT | Mod: 95 | Performed by: NURSE PRACTITIONER

## 2024-08-12 PROCEDURE — 2500000001 HC RX 250 WO HCPCS SELF ADMINISTERED DRUGS (ALT 637 FOR MEDICARE OP): Performed by: NURSE PRACTITIONER

## 2024-08-12 PROCEDURE — S9445 PT EDUCATION NOC INDIVID: HCPCS

## 2024-08-12 PROCEDURE — 90853 GROUP PSYCHOTHERAPY: CPT

## 2024-08-12 RX ORDER — FOLIC ACID 1 MG/1
1 TABLET ORAL DAILY
Status: SHIPPED | OUTPATIENT
Start: 2024-08-12 | End: 2024-08-16

## 2024-08-12 RX ORDER — BISMUTH SUBSALICYLATE 262 MG
1 TABLET,CHEWABLE ORAL DAILY
Status: SHIPPED | OUTPATIENT
Start: 2024-08-12 | End: 2024-08-16

## 2024-08-12 RX ORDER — LANOLIN ALCOHOL/MO/W.PET/CERES
100 CREAM (GRAM) TOPICAL DAILY
Status: SHIPPED | OUTPATIENT
Start: 2024-08-12 | End: 2024-08-16

## 2024-08-12 ASSESSMENT — PAIN SCALES - GENERAL: PAINLEVEL: 0-NO PAIN

## 2024-08-12 NOTE — GROUP NOTE
Group Topic: Chemical Dependency - Relapse   Group Date: 8/12/2024  Start Time: 11:00 AM  End Time: 12:00 PM  Facilitators: BARRY Deleon   Department: [facility]    Number of Participants: 12   Treatment Modality: Cognitive Behavioral Therapy and Dialectical Behavioral Therapy  Interventions utilized were exploration, reality testing, and support  Purpose: feelings, communication skills, and trigger / craving management  Comment: Group therapy with members of the [facility], [facility], and aftercare levels of treatment.  Started the group with a reading from “Keep It Simple.”  The reading talked about unfairness in life and how this can negatively impact our recovery if we are not careful.  Group members shared what they could relate to from today's reading.  Group members then provided recovery updates including any recent and/or current sobriety threats.      Name: Mary Mayers YOB: 1970   MR: [id number]      Level of Participation: active  Quality of Participation: appropriate/pleasant, attentive, cooperative, and engaged  Mood/Affect: appropriate  Progress: Gaining insight or knowledge.  Patient was active and engaged in today's group therapy session.  She shared with the group about distress tolerance, and how going to AA meetings was helping her in her recovery.  Patient reports ongoing sobriety and AA meeting attendance.    Plan: continue with services.  Patient will continue with PHP services.

## 2024-08-12 NOTE — PROGRESS NOTES
ARS Nurse Note    Name: Mary Mayers  MRN: [id number]  YOB: 1970    Date: 08/12/24    Reason for Visit:  No chief complaint on file.    Vitals:  BP: 117/65  Heart Rate: 76    Patient Active Problem List   Diagnosis    Abnormal uterine bleeding (AUB)    Alcohol use disorder, severe (Multi)    Anxiety    Benign essential hypertension    Closed fracture of neck of right fibula    Dizziness    Excess skin of abdomen    Hearing loss    History of bilateral tubal ligation    Paroxysmal SVT (supraventricular tachycardia) (CMS-[facility])    Injury of right knee    Laxity of abdominal wall    Laxity of skin    Medication side effect    Mixed conductive and sensorineural hearing loss of both ears    Obesity    PVC's (premature ventricular contractions)    Syncope    Tachycardia    Tinnitus, right ear    Trichomonas vaginalis infection    Fracture, foot    Menorrhagia    Alcoholism (Multi)    DAMION (generalized anxiety disorder)    Mild episode of recurrent major depressive disorder (CMS-[facility])    Primary insomnia       Allergies   Allergen Reactions    Fish Containing Products Anaphylaxis    Lisinopril Cough    Latex Rash       Outpatient Encounter Medications as of 8/12/2024   Medication Sig Dispense Refill    FLUoxetine (PROzac) 20 mg capsule Take 1 capsule (20 mg) by mouth once daily. 30 capsule 0    folic acid (Folvite) 1 mg tablet       hydroCHLOROthiazide (HYDRODiuril) 12.5 mg tablet       hydrOXYzine HCL (Atarax) 50 mg tablet       losartan (Cozaar) 50 mg tablet       metoprolol succinate XL (Toprol-XL) 100 mg 24 hr tablet TAKE ONE TABLET BY MOUTH ONE TIME DAILY 90 tablet 0    traZODone (Desyrel) 50 mg tablet        No facility-administered encounter medications on file as of 8/12/2024.       Progress:    Mary arrived on time for PHP. She reports sleeping 6 hours overnight. She reports having intermittent headaches over the weekend. She reports this happens days after she stops drinking and reports taking  "tylenol to manage.  She rates cravings for alcohol as 8/10 with 10 being the most severe and denies use overnight. She rates anxiety and depression as 8/10 with 10 being the most severe and denies homicidal ideations scored negative on the [place] suicide risk assessment. The provider discussed [facility] to manage tension and headaches. She reports picking up Prozac this weekend. She reports going to a meeting this weekend that was \"weird\" and she ended up leaving and going to a different meeting. She plans to frequenting different meetings until she finds the ones she likes.    "

## 2024-08-12 NOTE — GROUP NOTE
Group Topic: Dialectical Behavioral Therapy - Mindfulness   Group Date: 8/12/2024  Start Time:  9:00 AM  End Time: 10:00 AM  Facilitators: BARRY Deleon   Department: Replaced by [facility]    Number of Participants: 7   Treatment Modality: Dialectical Behavioral Therapy  Interventions utilized were exploration, group exercise, and support  Purpose: feelings, communication skills, and trigger / craving management  Comment: Patient participated in a mindfulness meditation exercise and a group check-in.  The purpose and benefits of mindfulness meditation in strengthening relapse prevention skills was explained.  A mindfulness meditation exercise was conducted, and patient provided feedback in the form of personal observations from the exercise.      Name: Mary Mayers YOB: 1970   MR: [id number]      Level of Participation: when cued  Quality of Participation: appropriate/pleasant, attentive, cooperative, and engaged  Mood/Affect: appropriate  Progress: Gaining insight or knowledge  Plan: continue with services.  Patient will continue with PHP services.

## 2024-08-12 NOTE — GROUP NOTE
Group Topic: Chemical Dependency - Primary Disease   Group Date: 8/12/2024  Start Time:  1:00 PM  End Time:  2:00 PM  Facilitators: BARRY Deleon   Department: [facility]    Number of Participants: 2   Treatment Modality: Interpersonal Therapy  Interventions utilized were exploration, group exercise, and support  Purpose: feelings, communication skills, and self-worth  Comment: 60 minute Activity Group with member of the PHP level of treatment.  Patient filled out a sentence stem completion form related to who they actually are.  This exercise causes patients to think deeply about who they really are.  Patient discussed at length why they answered questions the way they did.  An extended discussion ensued.  Patient attentive and engaged in Activity Group.    Name: Mary Mayers YOB: 1970   MR: [id number]      Level of Participation: active  Quality of Participation: appropriate/pleasant, attentive, cooperative, and engaged  Mood/Affect: appropriate  Progress: Gaining insight or knowledge  Plan: continue with services.  Patient will continue with PHP services.

## 2024-08-12 NOTE — GROUP NOTE
Group Topic: Chemical Dependency - Primary Disease   Group Date: 8/12/2024  Start Time: 10:00 AM  End Time: 11:00 AM  Facilitators: Phil Johns [facility]; [facility]   [facility]    Number of Participants: 7   Treatment Modality: Psychoeducation  Interventions utilized were patient education  Purpose: insight or knowledge  Comment: 60 minute Education group with patients from the [facility] and [facility] levels of treatment.  The topic of education was the Disease Concept.  There was a lecture given covering the main components of the disease concept, and this was followed by a group discussion about the Disease Concept of Addiction.  Patients shared how they could relate to the Disease Concept, and also asked pertinent questions about this topic.       Name: Mary Mayers YOB: 1970   MR: [id number]      Level of Participation: when cued  Quality of Participation: appropriate/pleasant, attentive, cooperative, and engaged  Mood/Affect: appropriate  Progress: Gaining insight or knowledge  Plan: continue with services.  Patient will continue with PHP services.

## 2024-08-13 ENCOUNTER — TELEMEDICINE (OUTPATIENT)
Dept: BEHAVIORAL HEALTH | Facility: CLINIC | Age: 54
End: 2024-08-13
Payer: COMMERCIAL

## 2024-08-13 ENCOUNTER — MULTIDISCIPLINARY VISIT (OUTPATIENT)
Dept: BEHAVIORAL HEALTH | Facility: CLINIC | Age: 54
End: 2024-08-13
Payer: COMMERCIAL

## 2024-08-13 VITALS — HEART RATE: 89 BPM | SYSTOLIC BLOOD PRESSURE: 119 MMHG | DIASTOLIC BLOOD PRESSURE: 70 MMHG

## 2024-08-13 DIAGNOSIS — F10.20 ALCOHOL USE DISORDER, SEVERE (MULTI): ICD-10-CM

## 2024-08-13 DIAGNOSIS — F41.1 GAD (GENERALIZED ANXIETY DISORDER): ICD-10-CM

## 2024-08-13 PROCEDURE — S9445 PT EDUCATION NOC INDIVID: HCPCS

## 2024-08-13 PROCEDURE — 90853 GROUP PSYCHOTHERAPY: CPT

## 2024-08-13 PROCEDURE — 90853 GROUP PSYCHOTHERAPY: CPT | Performed by: COUNSELOR

## 2024-08-13 PROCEDURE — 2500000001 HC RX 250 WO HCPCS SELF ADMINISTERED DRUGS (ALT 637 FOR MEDICARE OP): Performed by: NURSE PRACTITIONER

## 2024-08-13 PROCEDURE — 99214 OFFICE O/P EST MOD 30 MIN: CPT | Mod: 95 | Performed by: NURSE PRACTITIONER

## 2024-08-13 ASSESSMENT — PAIN SCALES - GENERAL: PAINLEVEL: 0-NO PAIN

## 2024-08-13 NOTE — GROUP NOTE
Group Topic: Self-Care/Wellness   Group Date: 8/13/2024  Start Time:  9:00 AM  End Time: 10:00 AM  Facilitators: Chano Loyd RN   Department: [facility]    Number of Participants: 4   Treatment Modality: Psychoeducation  Interventions utilized were group exercise and mental fitness  Purpose: insight or knowledge and self-care  Comment: Mindfulness and meditation: Started group by checking in with each participant and discussing the usefulness of mindfulness and meditation. Next group engaged in a reading that discussed the importance of self care. The meditation involved progressive muscle relaxation. After meditation each member discussed what they noticed or felt during the relaxation exercise.     Name: Mary Mayers YOB: 1970   MR: [id number]      Level of Participation: active  Quality of Participation: appropriate/pleasant and engaged  Mood/Affect: appropriate  Progress: Gaining insight or knowledge  Plan: continue with services

## 2024-08-13 NOTE — PROGRESS NOTES
ARS Clinical Progress Note      Name: Mary Mayers  MRN: [id number]   YOB: 1970    Date: 08/13/24    Record Review: Yes   OARRS Reviewed: Reviewed OARRS on 8/5/24-OARRS has been reviewed and is consistent with prescribed medications diagnosis.    Summary:  Mary  is a 52yo female presenting to [facility] today for treatment of Alcohol use disorder. She reports consuming a half gallon of wine every 2-3 days since her relapse several weeks ago. Last use Sunday 8/4 at 8pm had 1.5 glasses of wine. Ongoing cravings. Attending 12 step meetings daily. She endorses low mood and anxiety. Did start psychotropic medications to target mood and cravings.     IMPRESSION  Alcohol use disorder, severe  Major Depressive disorder  Generalized Anxiety  R/o PTSD    PLAN  Continue PHP  Monitor mood for destabilization  Group psychotherapy  Group Psychoeducation  Family program  Continue Prozac to target mood  Recommend continued 12 step program  Continue home medication as prescribed  Follow up with PCP as scheduled  Rec individual therapy to target anxiety, grief  Ref to [facility] -consult for headache/anxiety    Treatment Plan/Recommendations: Continue current medications, follow up as scheduled    Diagnosis: Alcohol use disorder severe, MDD, DAMINO    Complexity Issues:  Number of diagnoses or management options multiple  Problems effect on treatment and management yes  Risk of complications and/or morbidity or mortality moderate  Coordination of care (e.g. with patient and/or family, social workers, nursing staff, other doctors) >50% of visit    Patient Response to Treatment:  Risks, benefits, side effects, drug to drug interactions and alternatives to treatment were discussed in my usual manner: yes    Patient response to treatment fair  Reason for not reducing medication dose(s) high risk of patient's deterioration    Topics Discussed:  Nature of diagnosis and/or prognosis, Aspects of aging process and  relationship to the current problem, Nature of possible treatment options/drug interaction, Risk of non-treatment, Psychopharmacologic treatment options/possible benefits and risks, Nature of reasons for and possible benefits from psychotherapy, Family and/or situational stressors, Behavioral and/or environmental changes that might help, Medical records reviewed, Communication with facility staff, Referred for psychotherapy, and Forms/reports filled out    Psychotherapy/Counseling: encouraged mindfulness and relaxation techniques for anxiety mgm    Review with patient: Treatment plan reviewed with the patient.  Medication risks/benefit reviewed with the patient    Subjective   Chief Complaint: Alcohol use disorder, MDD    HPI:   Mary is a 54yo female with PMHx htn, SVT, hearing loss, depression, anxiety is presenting to [facility] today for treatment of alcohol use disorder. She has been consuming half gallon of wine 2-3 days per week.  Last consumption Sunday 8/4 around 8pm. She reports experiencing cravings; taking Naltrexone daily. She is attending 12 step meetings daily. Patient reports mood has been depressed and anxious;  started Prozac Sunday. Sleep is disrupted- discussed mindfulness. Plans to meet with a friend this evening then attend a 12 step meeting. She volunteered yesterday, which provided sense of reward. Denies wishes for death or consideration for suicide. CSSRS negative.       Physical/Somatic Complaints  The patient lists: headache    Past Psychiatric History:   Previous therapy: yes with [facility]  Previous psychiatric treatment and medication trials: yes - Zoloft, Hydroxyzine, Gabapentin, Naltrexone, Campral  Previous psychiatric hospitalizations: no  Previous diagnoses: yes - MDD, DAMION, PTSD  Previous suicide attempts: no  History of violence: no  Currently in treatment with n/a.  Education: college graduate  Other pertinent history: None  Depression screening was performed with standardized tool: Yes -  Depression PHQ9 16, DAMION 7 15    Medical History:  Past Medical History:   Diagnosis Date    Anxiety     Depression     Essential (primary) hypertension 10/21/2021    Benign essential hypertension    HL (hearing loss)     Hx of gastric bypass     Other seasonal allergic rhinitis     Seasonal allergies     Current Medications:     Current Outpatient Medications:     FLUoxetine (PROzac) 20 mg capsule, Take 1 capsule (20 mg) by mouth once daily., Disp: 30 capsule, Rfl: 0    folic acid (Folvite) 1 mg tablet, , Disp: , Rfl:     hydroCHLOROthiazide (HYDRODiuril) 12.5 mg tablet, , Disp: , Rfl:     hydrOXYzine HCL (Atarax) 50 mg tablet, , Disp: , Rfl:     losartan (Cozaar) 50 mg tablet, , Disp: , Rfl:     metoprolol succinate XL (Toprol-XL) 100 mg 24 hr tablet, TAKE ONE TABLET BY MOUTH ONE TIME DAILY, Disp: 90 tablet, Rfl: 0    traZODone (Desyrel) 50 mg tablet, , Disp: , Rfl:     Current Facility-Administered Medications:     folic acid (Folvite) tablet 1 mg, 1 mg, oral, Daily, Janina N Aaliyah, APRN-CNP, 1 mg at 08/12/24 1345    multivitamin 1 tablet, 1 tablet, oral, Daily, Janina N Aaliyah, APRN-CNP, 1 tablet at 08/12/24 1346    thiamine (Vitamin B-1) tablet 100 mg, 100 mg, oral, Daily, Janina N Aaliyah, APRN-CNP, 100 mg at 08/12/24 1347    Active Problems:  Patient Active Problem List    Diagnosis Date Noted    DAMION (generalized anxiety disorder) 06/04/2024    Mild episode of recurrent major depressive disorder (CMS-HCC) 06/04/2024    Primary insomnia 06/04/2024    Alcoholism (Multi) 03/18/2024    Abnormal uterine bleeding (AUB) 07/07/2023    Anxiety 07/07/2023    Closed fracture of neck of right fibula 07/07/2023    Dizziness 07/07/2023    Hearing loss 07/07/2023    Paroxysmal SVT (supraventricular tachycardia) (CMS-HCC) 07/07/2023    Injury of right knee 07/07/2023    Medication side effect 07/07/2023    Mixed conductive and sensorineural hearing loss of both ears 07/07/2023    Obesity 07/07/2023    PVC's  (premature ventricular contractions) 07/07/2023    Syncope 07/07/2023    Tachycardia 07/07/2023    Tinnitus, right ear 07/07/2023    Trichomonas vaginalis infection 07/07/2023    Laxity of abdominal wall 05/05/2022    Laxity of skin 05/05/2022    Excess skin of abdomen 05/04/2022    Alcohol use disorder, severe (Multi) 01/03/2018    Benign essential hypertension 01/03/2018    History of bilateral tubal ligation 01/03/2018    Menorrhagia 04/10/2015    Fracture, foot 05/24/2010     Surgical History:  Past Surgical History:   Procedure Laterality Date    BREAST SURGERY      CHOLECYSTECTOMY  03/26/2018    Cholecystectomy Laparoscopic    COSMETIC SURGERY      OTHER SURGICAL HISTORY  04/08/2020    Gastric bypass surgery    OTHER SURGICAL HISTORY  03/16/2021    Vulvectomy    TUBAL LIGATION  03/26/2018    Tubal Ligation     Allergies:  Allergies   Allergen Reactions    Fish Containing Products Anaphylaxis    Lisinopril Cough    Latex Rash       Review of Systems:    Constitutional: Denies cough, weight changes, sweats or fever  Eyes/Ears/Nose/Throat: wears glasses denies difficulty hearing or pain in ears, denies sore throat or harshness, denies rhinorrhea  Respiratory: Denies shortness of breath   Cardiovascular: hx svt, htn  Gastrointestinal: Denies abdominal pain, nausea or vomiting  Genitourinary: Denies incontinence, pain or frequency with urination  Neurological: Denies numbness, tingling, tremor. Denies hx seizures  Hematologic/Lymphatic: Denies bleeding disorders  Endocrine: denies hx thyroid disease or diabetes  Musculoskeletal: gait steady  Integumentary: skin warm and dry, no rash or lesions noted     Family History:  Family History   Problem Relation Name Age of Onset    Colon cancer Mother Cintia     Alcohol abuse Father Sawyer     Stroke Father Sawyer      Social History:  Social History     Socioeconomic History    Marital status: Single     Spouse name: Not on file    Number of children: Not on file    Years of  education: Not on file    Highest education level: Not on file   Occupational History    Occupation: Nurse   Tobacco Use    Smoking status: Former     Types: Cigarettes    Smokeless tobacco: Current   Vaping Use    Vaping status: Some Days   Substance and Sexual Activity    Alcohol use: Yes     Alcohol/week: 10.0 standard drinks of alcohol     Types: 10 Glasses of wine per week     Comment: 4-5 glasses of wine/day    Drug use: Never    Sexual activity: Not on file   Other Topics Concern    Not on file   Social History Narrative    Not on file     Social Determinants of Health     Financial Resource Strain: Not on File (2023)    Received from [facility]    Financial Resource Strain     Financial Resource Strain: 0   Food Insecurity: Not on File (2023)    Received from [facility]    Food Insecurity     Food: 0   Transportation Needs: Not on File (2023)    Received from [facility]    Transportation Needs     Transportation: 0   Physical Activity: Not on File (2023)    Received from [facility]    Physical Activity     Physical Activity: 0   Stress: Not on File (2023)    Received from [facility]    Stress     Stress: 0   Social Connections: Not on File (2023)    Received from [facility]    Social Connections     Social Connections and Isolation: 0   Intimate Partner Violence: Not on file   Housing Stability: Not on File (2023)    Received from [facility]    Housing Stability     Housin       Psychiatric Review Of Systems  Japonica reports history of depression and anxiety for several years.  Depression is characterized by experiencing low mood, anhedonia, low motivation, increased sleep, decreased energy, isolation. Denies wishes for death or consideration for suicide.  CSSRS negative. Also reports experiencing anxiety characterized by uncontrolled worry, mind racing, restlessness, heart racing. Reports hx of panic attacks recently characterized by sob, sweats. Denies ever  "experiencing rosalba of psychosis. Denies hx hospitalization.   Objective        Mental Status Exam:   The patient is alert, pleasant, and cooperative. Well-groomed and maintains good eye contact. Insight and judgment are fair. Oriented to time, place, and person. Speech is goal-directed, coherent, and of normal rate and tone. Thought process is logical. Mood is d\"ok\" and affect is congruent. Denies auditory or visual hallucinations. No overt signs of psychosis or rosalba noted. Denies wishes for death or consideration for suicide. Denies thoughts of violence toward others.     PHYSICAL EXAM  GENERAL: Alert and oriented x 3. No acute distress. Well-nourished.  HEENT: Moist mucous membranes. No scleral icterus.   LUNGS:  No accessory muscle use.  SKIN: No rashes or lesions. Warm and Dry  NEUROLOGIC: No tremor noted.  PSYCHIATRIC: Cooperative. Appropriate mood and affect.     Results:    119/70 89            Wt Readings from Last 1 Encounters:   08/05/24 73.9 kg (163 lb)           Total time on date of patient encounter 33    KACY Taveras-CNP      "

## 2024-08-13 NOTE — CARE PLAN
Problem: D1 Physiologic and Psychological Stability: Withdrawal from alcohol  Goal: Physiologic and Psychological Stability and progression through withdrawal symptomology  Outcome: Progressing  Goal: STG Patient will identify support persons by end of day 1  Outcome: Progressing  Goal: STG Patient will identify personally negative physical consequences of substance dependence by day 1  Outcome: Progressing  Goal: LTG Detoxification will be completed with minimally reported patient discomfort within week 1  Outcome: Progressing  Goal: LTG Detoxification will be completed with minimally reported patient discomfort within week 1  Outcome: Progressing  Goal: LTG Patient will list personally relevant relapse triggers by end of Partial Hospitalization Program (1-2 weeks)  Outcome: Progressing  Goal: LTG Patient will have information and a plan for continuing treatment prior to discharge by end of Partial Hospitalization Program (1-2 weeks)  Outcome: Progressing     Problem: D5 Substance free life: Lifestyle which reinforces maintenance of chemical dependency  Goal: STG Patient will discuss key principals of addictive disease and relate them to their personal experience by end of first week of treatment program  Outcome: Progressing  Goal: STG Patient will identify three personal reinforcers of chemical dependency by end of second week of treatment program  Outcome: Progressing  Goal: LTG Patient will establish a structured recovery peer support program during course of treatment program  Outcome: Progressing  Goal: LTG Patient will develop a written plan for continuing treatment post discharge during last scheduled week of treatment  Outcome: Progressing    Japonica has been endorsing intense cravings for alcohol rating 7-8/10 with 10 being the most severe. She has been keeping busy and attending AA meetings. She has been engaged in treatment and continues to endorse abstinence. The tentative plan is for her to  transition to IOP after this Thursday

## 2024-08-13 NOTE — GROUP NOTE
"Group Topic: Chemical Dependency - Preventative   Group Date: 8/13/2024  Start Time:  9:30 AM  End Time: 10:30 AM  Facilitators[facility]on [facility]   Department: [facility]    Number of Participants: 2   Treatment Modality: Cognitive Behavioral Therapy and Psychoeducation  Interventions utilized were patient education  Purpose: insight or knowledge and self-care  Comment: Father Braxton Lion \"The First Step\" Group members participated in educational presentation followed with an outline and questions. Information promoted growth and understanding about the beginning stage of recovery.    Name: Mary Mayers YOB: 1970   MR: [id number]      Level of Participation: active  Quality of Participation: attentive, cooperative, engaged, motivated, and offered feedback  Mood/Affect: appropriate, positive, and transparent  Progress: Gaining insight or knowledge  Plan: continue with services    "

## 2024-08-13 NOTE — PROGRESS NOTES
ARS Nurse Note    Name: Mary Mayers  MRN: [id number]  YOB: 1970    Date: 08/13/24    Reason for Visit:  No chief complaint on file.    Vitals:       Patient Active Problem List   Diagnosis    Abnormal uterine bleeding (AUB)    Alcohol use disorder, severe (Multi)    Anxiety    Benign essential hypertension    Closed fracture of neck of right fibula    Dizziness    Excess skin of abdomen    Hearing loss    History of bilateral tubal ligation    Paroxysmal SVT (supraventricular tachycardia) (CMS-HCC)    Injury of right knee    Laxity of abdominal wall    Laxity of skin    Medication side effect    Mixed conductive and sensorineural hearing loss of both ears    Obesity    PVC's (premature ventricular contractions)    Syncope    Tachycardia    Tinnitus, right ear    Trichomonas vaginalis infection    Fracture, foot    Menorrhagia    Alcoholism (Multi)    DAMION (generalized anxiety disorder)    Mild episode of recurrent major depressive disorder (CMS[facility])    Primary insomnia       Allergies   Allergen Reactions    Fish Containing Products Anaphylaxis    Lisinopril Cough    Latex Rash       Outpatient Encounter Medications as of 8/13/2024   Medication Sig Dispense Refill    FLUoxetine (PROzac) 20 mg capsule Take 1 capsule (20 mg) by mouth once daily. 30 capsule 0    folic acid (Folvite) 1 mg tablet       hydroCHLOROthiazide (HYDRODiuril) 12.5 mg tablet       hydrOXYzine HCL (Atarax) 50 mg tablet       losartan (Cozaar) 50 mg tablet       metoprolol succinate XL (Toprol-XL) 100 mg 24 hr tablet TAKE ONE TABLET BY MOUTH ONE TIME DAILY 90 tablet 0    traZODone (Desyrel) 50 mg tablet        Facility-Administered Encounter Medications as of 8/13/2024   Medication Dose Route Frequency Provider Last Rate Last Admin    folic acid (Folvite) tablet 1 mg  1 mg oral Daily ANTON Taveras   1 mg at 08/12/24 1345    multivitamin 1 tablet  1 tablet oral Daily ANTON Taveras   1 tablet at 08/12/24  1346    thiamine (Vitamin B-1) tablet 100 mg  100 mg oral Daily ANTON Taveras   100 mg at 08/12/24 1347       Progress:    Mary arrived on time for PHP. She reports sleeping 6 hours overnight. She reports her sleep as interrupted and this has been on going for years. She said a previous sleep study in 2018 revealed she had sleep apnea and she wore a cpap for awhile but has not followed up recently.   She rates cravings for alcohol as 7/10 with 10 being the most severe and denies use overnight. She rates anxiety  7/10  and depression as 6/10 with 10 being the most severe and denies homicidal ideations scored negative on the [place] suicide risk assessment. She reports volunteering at the [employer] last night and went to a meeting. Tonight she reports she will go to an early meeting and have dinner with a friend.

## 2024-08-13 NOTE — GROUP NOTE
Group Topic: Chemical Dependency - Primary Disease   Group Date: 8/13/2024  Start Time:  1:00 PM  End Time:  2:00 PM  Facilitators: BARRY Deleon   Department: [facility] PRAVEEN [facility]    Number of Participants: 2   Treatment Modality: Interpersonal Therapy  Interventions utilized were assignment, exploration, group exercise, and support  Purpose: feelings, communication skills, self-care, and trigger / craving management  Comment: 60 minute activity group.  Patient is in the PHP level of treatment.  Patient wrote a “Compassionate Letter” to themselves from the perspective of an all-wise, very loving, and supportive imaginary friend.  Patient wrote the letter and then shared the letter.  Patient also participated in discussion about the exercise.     Name: Mary Mayers YOB: 1970   MR: [id number]      Level of Participation: active  Quality of Participation: appropriate/pleasant, attentive, cooperative, and engaged  Mood/Affect: appropriate  Progress: Gaining insight or knowledge  Plan: continue with services.  Continue with PHP services.

## 2024-08-13 NOTE — GROUP NOTE
"Group Topic: Chemical Dependency - AA   Group Date: 8/13/2024  Start Time: 10:40 AM  End Time: 11:55 AM  Facilitators: STEVEN Marrero   Department: [facility]AVEEN [facility]    Number of Participants: 2   Treatment Modality: Cognitive Behavioral Therapy  Interventions utilized were confrontation, group exercise, and problem solving  Purpose: coping skills, feelings, irrational fears, self-care, and relapse prevention strategies  Comment: Reading from \"Keep It Simple\" Making Amends Japonica demonstrated an understanding about today's reading, expressing issues with current relationship due to her drinking. Patient recognized unresolved fears that escalates her drinking.     Name: Mary Mayers YOB: 1970   MR: [id number]      Level of Participation: active  Quality of Participation: attentive, cooperative, motivated, offered feedback, and supportive  Mood/Affect: appropriate and positive  Progress: Gaining insight or knowledge  Plan: continue with services    "

## 2024-08-14 ENCOUNTER — TELEMEDICINE (OUTPATIENT)
Dept: BEHAVIORAL HEALTH | Facility: CLINIC | Age: 54
End: 2024-08-14
Payer: COMMERCIAL

## 2024-08-14 ENCOUNTER — MULTIDISCIPLINARY VISIT (OUTPATIENT)
Dept: BEHAVIORAL HEALTH | Facility: CLINIC | Age: 54
End: 2024-08-14
Payer: COMMERCIAL

## 2024-08-14 VITALS — SYSTOLIC BLOOD PRESSURE: 125 MMHG | DIASTOLIC BLOOD PRESSURE: 67 MMHG | HEART RATE: 105 BPM

## 2024-08-14 DIAGNOSIS — F33.0 MILD EPISODE OF RECURRENT MAJOR DEPRESSIVE DISORDER (CMS-HCC): ICD-10-CM

## 2024-08-14 DIAGNOSIS — F10.20 ALCOHOL USE DISORDER, SEVERE (MULTI): ICD-10-CM

## 2024-08-14 DIAGNOSIS — F41.1 GAD (GENERALIZED ANXIETY DISORDER): ICD-10-CM

## 2024-08-14 LAB
1OH-MIDAZOLAM UR CFM-MCNC: <25 NG/ML
6MAM UR CFM-MCNC: <25 NG/ML
7AMINOCLONAZEPAM UR CFM-MCNC: <25 NG/ML
A-OH ALPRAZ UR CFM-MCNC: <25 NG/ML
ALPRAZ UR CFM-MCNC: <25 NG/ML
AMPHETAMINES UR QL SCN: NORMAL
BARBITURATES UR QL SCN: NORMAL
BENZODIAZ UR QL SCN: NORMAL
BZE UR QL SCN: NORMAL
CANNABINOIDS UR QL SCN: NORMAL
CHLORDIAZEP UR CFM-MCNC: <25 NG/ML
CLONAZEPAM UR CFM-MCNC: <25 NG/ML
CODEINE UR CFM-MCNC: <50 NG/ML
DIAZEPAM UR CFM-MCNC: <25 NG/ML
FENTANYL+NORFENTANYL UR QL SCN: NORMAL
HYDROCODONE CTO UR CFM-MCNC: <25 NG/ML
HYDROMORPHONE UR CFM-MCNC: <25 NG/ML
LORAZEPAM UR CFM-MCNC: <25 NG/ML
METHADONE UR QL SCN: NORMAL
MIDAZOLAM UR CFM-MCNC: <25 NG/ML
MORPHINE UR CFM-MCNC: <50 NG/ML
NORDIAZEPAM UR CFM-MCNC: <25 NG/ML
NORHYDROCODONE UR CFM-MCNC: <25 NG/ML
NOROXYCODONE UR CFM-MCNC: <25 NG/ML
OPIATES UR QL SCN: NORMAL
OXAZEPAM UR CFM-MCNC: <25 NG/ML
OXYCODONE UR CFM-MCNC: <25 NG/ML
OXYCODONE+OXYMORPHONE UR QL SCN: NORMAL
OXYMORPHONE UR CFM-MCNC: <25 NG/ML
PCP UR QL SCN: NORMAL
TEMAZEPAM UR CFM-MCNC: <25 NG/ML

## 2024-08-14 PROCEDURE — 90853 GROUP PSYCHOTHERAPY: CPT

## 2024-08-14 PROCEDURE — 99214 OFFICE O/P EST MOD 30 MIN: CPT | Mod: 95 | Performed by: NURSE PRACTITIONER

## 2024-08-14 PROCEDURE — 2500000001 HC RX 250 WO HCPCS SELF ADMINISTERED DRUGS (ALT 637 FOR MEDICARE OP): Performed by: NURSE PRACTITIONER

## 2024-08-14 PROCEDURE — S9445 PT EDUCATION NOC INDIVID: HCPCS

## 2024-08-14 PROCEDURE — 80307 DRUG TEST PRSMV CHEM ANLYZR: CPT

## 2024-08-14 ASSESSMENT — PAIN SCALES - GENERAL
PAINLEVEL: 0-NO PAIN
PAINLEVEL: 0-NO PAIN

## 2024-08-14 NOTE — PROGRESS NOTES
ARS Nurse Note    Name: Mary Mayers  MRN: [id number]  YOB: 1970    Date: 08/14/24    Reason for Visit:  No chief complaint on file.    Vitals:  BP: 125/67  Heart Rate: 105    Patient Active Problem List   Diagnosis    Abnormal uterine bleeding (AUB)    Alcohol use disorder, severe (Multi)    Anxiety    Benign essential hypertension    Closed fracture of neck of right fibula    Dizziness    Excess skin of abdomen    Hearing loss    History of bilateral tubal ligation    Paroxysmal SVT (supraventricular tachycardia) (CMS-[facility])    Injury of right knee    Laxity of abdominal wall    Laxity of skin    Medication side effect    Mixed conductive and sensorineural hearing loss of both ears    Obesity    PVC's (premature ventricular contractions)    Syncope    Tachycardia    Tinnitus, right ear    Trichomonas vaginalis infection    Fracture, foot    Menorrhagia    Alcoholism (Multi)    DAMION (generalized anxiety disorder)    Mild episode of recurrent major depressive disorder (CMS-[facility])    Primary insomnia       Allergies   Allergen Reactions    Fish Containing Products Anaphylaxis    Lisinopril Cough    Latex Rash       Outpatient Encounter Medications as of 8/14/2024   Medication Sig Dispense Refill    FLUoxetine (PROzac) 20 mg capsule Take 1 capsule (20 mg) by mouth once daily. 30 capsule 0    folic acid (Folvite) 1 mg tablet       hydroCHLOROthiazide (HYDRODiuril) 12.5 mg tablet       hydrOXYzine HCL (Atarax) 50 mg tablet       losartan (Cozaar) 50 mg tablet       metoprolol succinate XL (Toprol-XL) 100 mg 24 hr tablet TAKE ONE TABLET BY MOUTH ONE TIME DAILY 90 tablet 0    traZODone (Desyrel) 50 mg tablet        Facility-Administered Encounter Medications as of 8/14/2024   Medication Dose Route Frequency Provider Last Rate Last Admin    folic acid (Folvite) tablet 1 mg  1 mg oral Daily KACY Taveras-CNP   1 mg at 08/13/24 1304    multivitamin 1 tablet  1 tablet oral Daily Janina Fischer  APRN-CNP   1 tablet at 08/13/24 1305    thiamine (Vitamin B-1) tablet 100 mg  100 mg oral Daily KACY Taveras-CNP   100 mg at 08/13/24 1306       Progress:     Mary arrived on time for PHP. Reports sleeping 6 hours overnight. Rates cravings as 6/10 with 10 being the most severe. Rates anxiety as 5/10 and Rates depression as 4/10 with 10 being the most severe. She scored negative on the [facility] [place] suicide risk assessment. She reports meeting friends for lunch and went to a meeting where she stayed over and spoke to some people in AA. She reports sleeping better last night she reports her sleep was less interrupted.     1400: Reviewed the plan for the remainder of the week. She is going to come in on Friday for IOP and then leave at 1100 for her doctor appointment.

## 2024-08-14 NOTE — PROGRESS NOTES
ARS Clinical Progress Note      Name: Mary Mayers  MRN: [id number]   YOB: 1970    Date: 08/14/24    Record Review: Yes   OARRS Reviewed: Reviewed OARRS on 8/5/24-OARRS has been reviewed and is consistent with prescribed medications diagnosis.    Summary:  Mary  is a 52yo female presenting to [facility] today for treatment of Alcohol use disorder. She reports consuming a half gallon of wine every 2-3 days since her relapse several weeks ago. Last use Sunday 8/4 at 8pm had 1.5 glasses of wine. Ongoing cravings. Attending 12 step meetings daily. She endorses low mood and anxiety. Did start psychotropic medications to target mood and cravings.; tolerating well.     IMPRESSION  Alcohol use disorder, severe  Major Depressive disorder  Generalized Anxiety  R/o PTSD    PLAN  Continue PHP  Monitor mood for destabilization  Group psychotherapy  Group Psychoeducation  Family program  Continue Prozac to target mood  Recommend continued 12 step program  Continue home medication as prescribed  Follow up with PCP as scheduled  Rec individual therapy to target anxiety, grief  Ref to [facility] -consult for headache/anxiety    Treatment Plan/Recommendations: Continue current medications, follow up as scheduled    Diagnosis: Alcohol use disorder severe, MDD, DAMION    Complexity Issues:  Number of diagnoses or management options multiple  Problems effect on treatment and management yes  Risk of complications and/or morbidity or mortality moderate  Coordination of care (e.g. with patient and/or family, social workers, nursing staff, other doctors) >50% of visit    Patient Response to Treatment:  Risks, benefits, side effects, drug to drug interactions and alternatives to treatment were discussed in my usual manner: yes    Patient response to treatment fair  Reason for not reducing medication dose(s) high risk of patient's deterioration    Topics Discussed:  Nature of diagnosis and/or prognosis, Aspects of  aging process and relationship to the current problem, Nature of possible treatment options/drug interaction, Risk of non-treatment, Psychopharmacologic treatment options/possible benefits and risks, Nature of reasons for and possible benefits from psychotherapy, Family and/or situational stressors, Behavioral and/or environmental changes that might help, Medical records reviewed, Communication with facility staff, Referred for psychotherapy, and Forms/reports filled out    Psychotherapy/Counseling: encouraged mindfulness and relaxation techniques for anxiety mgm    Review with patient: Treatment plan reviewed with the patient.  Medication risks/benefit reviewed with the patient    Subjective   Chief Complaint: Alcohol use disorder, MDD    HPI:   Mary is a 54yo female with PMHx htn, SVT, hearing loss, depression, anxiety is presenting to [facility] today for treatment of alcohol use disorder. She has been consuming half gallon of wine 2-3 days per week.  Last consumption Sunday 8/4 around 8pm. She reports experiencing cravings but feels they are getting a little better; taking Naltrexone daily. She is attending 12 step meetings daily and making contacts. Patient reports mood has been depressed and anxious;  started Prozac Sunday. Sleep is improving but disrupted. Met with friends for dinner and then attended 12 step meeting last night. Has plans for another meeting this evening. Mood is still depressed but feel some improvement; tolerating Prozac well.       Physical/Somatic Complaints  The patient lists: headache    Past Psychiatric History:   Previous therapy: yes with [facility]  Previous psychiatric treatment and medication trials: yes - Zoloft, Hydroxyzine, Gabapentin, Naltrexone, Campral  Previous psychiatric hospitalizations: no  Previous diagnoses: yes - MDD, DAMION, PTSD  Previous suicide attempts: no  History of violence: no  Currently in treatment with n/a.  Education: college graduate  Other pertinent history:  None  Depression screening was performed with standardized tool: Yes - Depression PHQ9 16, DAMION 7 15    Medical History:  Past Medical History:   Diagnosis Date    Anxiety     Depression     Essential (primary) hypertension 10/21/2021    Benign essential hypertension    HL (hearing loss)     Hx of gastric bypass     Other seasonal allergic rhinitis     Seasonal allergies     Current Medications:     Current Outpatient Medications:     FLUoxetine (PROzac) 20 mg capsule, Take 1 capsule (20 mg) by mouth once daily., Disp: 30 capsule, Rfl: 0    folic acid (Folvite) 1 mg tablet, , Disp: , Rfl:     hydroCHLOROthiazide (HYDRODiuril) 12.5 mg tablet, , Disp: , Rfl:     hydrOXYzine HCL (Atarax) 50 mg tablet, , Disp: , Rfl:     losartan (Cozaar) 50 mg tablet, , Disp: , Rfl:     metoprolol succinate XL (Toprol-XL) 100 mg 24 hr tablet, TAKE ONE TABLET BY MOUTH ONE TIME DAILY, Disp: 90 tablet, Rfl: 0    traZODone (Desyrel) 50 mg tablet, , Disp: , Rfl:     Current Facility-Administered Medications:     folic acid (Folvite) tablet 1 mg, 1 mg, oral, Daily, Janina N Aaliyah, APRN-CNP, 1 mg at 08/13/24 1304    multivitamin 1 tablet, 1 tablet, oral, Daily, Janina N Aaliyah, APRN-CNP, 1 tablet at 08/13/24 1305    thiamine (Vitamin B-1) tablet 100 mg, 100 mg, oral, Daily, Janina N Aaliyah, APRN-CNP, 100 mg at 08/13/24 1306    Active Problems:  Patient Active Problem List    Diagnosis Date Noted    DAMION (generalized anxiety disorder) 06/04/2024    Mild episode of recurrent major depressive disorder (CMS-HCC) 06/04/2024    Primary insomnia 06/04/2024    Alcoholism (Multi) 03/18/2024    Abnormal uterine bleeding (AUB) 07/07/2023    Anxiety 07/07/2023    Closed fracture of neck of right fibula 07/07/2023    Dizziness 07/07/2023    Hearing loss 07/07/2023    Paroxysmal SVT (supraventricular tachycardia) (CMS-HCC) 07/07/2023    Injury of right knee 07/07/2023    Medication side effect 07/07/2023    Mixed conductive and sensorineural  hearing loss of both ears 07/07/2023    Obesity 07/07/2023    PVC's (premature ventricular contractions) 07/07/2023    Syncope 07/07/2023    Tachycardia 07/07/2023    Tinnitus, right ear 07/07/2023    Trichomonas vaginalis infection 07/07/2023    Laxity of abdominal wall 05/05/2022    Laxity of skin 05/05/2022    Excess skin of abdomen 05/04/2022    Alcohol use disorder, severe (Multi) 01/03/2018    Benign essential hypertension 01/03/2018    History of bilateral tubal ligation 01/03/2018    Menorrhagia 04/10/2015    Fracture, foot 05/24/2010     Surgical History:  Past Surgical History:   Procedure Laterality Date    BREAST SURGERY      CHOLECYSTECTOMY  03/26/2018    Cholecystectomy Laparoscopic    COSMETIC SURGERY      OTHER SURGICAL HISTORY  04/08/2020    Gastric bypass surgery    OTHER SURGICAL HISTORY  03/16/2021    Vulvectomy    TUBAL LIGATION  03/26/2018    Tubal Ligation     Allergies:  Allergies   Allergen Reactions    Fish Containing Products Anaphylaxis    Lisinopril Cough    Latex Rash       Review of Systems:    Constitutional: Denies cough, weight changes, sweats or fever  Eyes/Ears/Nose/Throat: wears glasses denies difficulty hearing or pain in ears, denies sore throat or harshness, denies rhinorrhea  Respiratory: Denies shortness of breath   Cardiovascular: hx svt, htn  Gastrointestinal: Denies abdominal pain, nausea or vomiting  Genitourinary: Denies incontinence, pain or frequency with urination  Neurological: Denies numbness, tingling, tremor. Denies hx seizures  Hematologic/Lymphatic: Denies bleeding disorders  Endocrine: denies hx thyroid disease or diabetes  Musculoskeletal: gait steady  Integumentary: skin warm and dry, no rash or lesions noted     Family History:  Family History   Problem Relation Name Age of Onset    Colon cancer Mother Cintia     Alcohol abuse Father Sawyer     Stroke Father Sawyer      Social History:  Social History     Socioeconomic History    Marital status: Single      Spouse name: Not on file    Number of children: Not on file    Years of education: Not on file    Highest education level: Not on file   Occupational History    Occupation: Nurse   Tobacco Use    Smoking status: Former     Types: Cigarettes    Smokeless tobacco: Current   Vaping Use    Vaping status: Some Days   Substance and Sexual Activity    Alcohol use: Yes     Alcohol/week: 10.0 standard drinks of alcohol     Types: 10 Glasses of wine per week     Comment: 4-5 glasses of wine/day    Drug use: Never    Sexual activity: Not on file   Other Topics Concern    Not on file   Social History Narrative    Not on file     Social Determinants of Health     Financial Resource Strain: Not on File (2023)    Received from [facility]    Financial Resource Strain     Financial Resource Strain: 0   Food Insecurity: Not on File (2023)    Received from [facility]    Food Insecurity     Food: 0   Transportation Needs: Not on File (2023)    Received from [facility]    Transportation Needs     Transportation: 0   Physical Activity: Not on File (2023)    Received from [facility]    Physical Activity     Physical Activity: 0   Stress: Not on File (2023)    Received from [facility]    Stress     Stress: 0   Social Connections: Not on File (2023)    Received from [facility]    Social Connections     Social Connections and Isolation: 0   Intimate Partner Violence: Not on file   Housing Stability: Not on File (2023)    Received from [facility]    Housing Stability     Housin       Psychiatric Review Of Systems  Japonica reports history of depression and anxiety for several years.  Depression is characterized by experiencing low mood, anhedonia, low motivation, increased sleep, decreased energy, isolation. Denies wishes for death or consideration for suicide.  CSSRS negative. Also reports experiencing anxiety characterized by uncontrolled worry, mind racing, restlessness, heart racing. Reports  "hx of panic attacks recently characterized by sob, sweats. Denies ever experiencing rosalba of psychosis. Denies hx hospitalization.   Objective        Mental Status Exam:   The patient is alert, pleasant, and cooperative. Well-groomed and maintains good eye contact. Insight and judgment are fair. Oriented to time, place, and person. Speech is goal-directed, coherent, and of normal rate and tone. Thought process is logical. Mood is \"ok\" and affect is congruent. Denies auditory or visual hallucinations. No overt signs of psychosis or rosalba noted. Denies wishes for death or consideration for suicide. Denies thoughts of violence toward others.     PHYSICAL EXAM  GENERAL: Alert and oriented x 3. No acute distress. Well-nourished.  HEENT: Moist mucous membranes. No scleral icterus.   LUNGS:  No accessory muscle use.  SKIN: No rashes or lesions. Warm and Dry  NEUROLOGIC: No tremor noted.  PSYCHIATRIC: Cooperative. Appropriate mood and affect.     Results:    125/67 105            Wt Readings from Last 1 Encounters:   08/05/24 73.9 kg (163 lb)           Total time on date of patient encounter 34    KACY Taveras-CNP      "

## 2024-08-14 NOTE — GROUP NOTE
Group Topic: Chemical Dependency - Relapse   Group Date: 8/14/2024  Start Time: 11:00 AM  End Time: 12:00 PM  Facilitators: BARRY eDleon   Department: [facility]EN [facility]    Number of Participants: 6   Treatment Modality: Cognitive Behavioral Therapy and Dialectical Behavioral Therapy  Interventions utilized were exploration, reality testing, and support  Purpose: feelings, communication skills, and trigger / craving management  Comment: Group therapy with members of the [facility] level of treatment.  Started the group with a reading from “Body, Mind, and Spirit.”  The reading talked about the importance of self-care.  Group members shared what they could relate to from today's reading.  Group member's then provided recovery updates including any recent and/or current sobriety threats.      Name: Mary Mayers YOB: 1970   MR: [id number]      Level of Participation: active  Quality of Participation: appropriate/pleasant, attentive, cooperative, and engaged  Mood/Affect: appropriate  Progress: Gaining insight or knowledge.  Patient was active and engaged in today's group therapy session.  Patient reports ongoing sobriety, and AA meeting attendance.  She provided meaningful support and feedback to her fellow group member who will be moving on to aftercare next week.    Plan: continue with services

## 2024-08-14 NOTE — GROUP NOTE
Group Topic: Chemical Dependency - Primary Disease   Group Date: 8/14/2024  Start Time: 10:00 AM  End Time: 11:00 AM  Facilitators: BARRY Deleon   Department: [facility] PRAVEEN [facility]    Number of Participants: 6   Treatment Modality: Psychoeducation  Interventions utilized were patient education  Purpose: insight or knowledge  Comment: Education group with patients from the PHP and IOP levels of treatment.  They viewed a presentation by Larry Tamez which dealt with the topics of powerlessness, unmanageability, and the recovery process.  The group then discussed the video.  Patient participated in watching the video and the subsequent discussion.        Name: Mary Mayers YOB: 1970   MR: [id number]      Level of Participation: when cued  Quality of Participation: appropriate/pleasant, attentive, cooperative, and engaged  Mood/Affect: appropriate  Progress: Gaining insight or knowledge  Plan: continue with services

## 2024-08-14 NOTE — GROUP NOTE
Group Topic: Dialectical Behavioral Therapy - Mindfulness   Group Date: 8/14/2024  Start Time:  9:00 AM  End Time: 10:00 AM  Facilitators: Mesha Crowe LPC   Department: [facility] PR[facility]EN [facility]    Number of Participants: 5   Treatment Modality: Skills Training  Interventions utilized were group exercise  Purpose: feelings and insight or knowledge  Comment: Beginning of group was focused on introduction to mindfulness exercise. Discussed rational for the practice of mindfulness as well as brief education about some of myths regarding the goals of mindfulness. Allowed time to process the exercise.       Name: Mary Mayers YOB: 1970   MR: [id number]      Level of Participation: active  Quality of Participation: cooperative  Mood/Affect: appropriate  Progress: Gaining insight or knowledge  Plan: continue with services

## 2024-08-15 ENCOUNTER — MULTIDISCIPLINARY VISIT (OUTPATIENT)
Dept: BEHAVIORAL HEALTH | Facility: CLINIC | Age: 54
End: 2024-08-15
Payer: COMMERCIAL

## 2024-08-15 ENCOUNTER — OFFICE VISIT (OUTPATIENT)
Dept: BEHAVIORAL HEALTH | Facility: CLINIC | Age: 54
End: 2024-08-15
Payer: COMMERCIAL

## 2024-08-15 DIAGNOSIS — F10.20 ALCOHOL USE DISORDER, SEVERE (MULTI): ICD-10-CM

## 2024-08-15 DIAGNOSIS — F41.1 GAD (GENERALIZED ANXIETY DISORDER): ICD-10-CM

## 2024-08-15 PROCEDURE — 90853 GROUP PSYCHOTHERAPY: CPT | Performed by: SOCIAL WORKER

## 2024-08-15 PROCEDURE — 90853 GROUP PSYCHOTHERAPY: CPT

## 2024-08-15 PROCEDURE — 99214 OFFICE O/P EST MOD 30 MIN: CPT | Performed by: NURSE PRACTITIONER

## 2024-08-15 PROCEDURE — S9445 PT EDUCATION NOC INDIVID: HCPCS

## 2024-08-15 NOTE — GROUP NOTE
"Group Topic: Chemical Dependency - Preventative   Group Date: 8/15/2024  Start Time: 11:00 AM  End Time: 12:00 PM  Facilitators: STEVEN Whitfield   Department: [facility] PRAVEEN [facility]    Number of Participants: 6   Treatment Modality: Interpersonal Therapy  Interventions utilized were support  Purpose: self-care  Comment: Clinician engaged the group in reading   \"Thought for the Day by Norman. Group members commented on how this applied to them.    Name: Mary Mayers YOB: 1970   MR: [id number]      Level of Participation: moderate  Quality of Participation: attentive, engaged, and motivated  Mood/Affect: bright and positive  Progress: Moderate  Plan: continue with services    "

## 2024-08-15 NOTE — GROUP NOTE
Group Topic: Chemical Dependency - Primary Disease   Group Date: 8/14/2024  Start Time:  1:00 PM  End Time:  2:00 PM  Facilitators: Mesha Crowe LPC   [facility]    Number of Participants: 2   Treatment Modality: Skills Training  Interventions utilized were group exercise  Purpose: insight or knowledge  Comment: Used session to explore suggestions offered to group members since beginning the recovery process including attending 12-step meetings, finding a sponsor, complete abstinence, obtaining a home group, staying away from old people, places and things etc.  Introduced group to decisional balance which explores the pro's and con's of making a change vs. not making a change.  Worked through example as a group.  Encouraged group members to take some time to work on their own decisional balance regarding a recovery suggestion they are considering.     Name: Mary Mayers YOB: 1970   MR: [id number]      Level of Participation: active  Quality of Participation: cooperative  Mood/Affect: appropriate  Progress: Gaining insight or knowledge  Plan: continue with services

## 2024-08-15 NOTE — GROUP NOTE
Group Topic: Chemical Dependency - Primary Disease   Group Date: 8/15/2024  Start Time: 10:00 AM  End Time: 11:00 AM  Facilitators: STEVEN Whitfield   Department: [facility] PRAVEEN [facility]    Number of Participants: 6   Treatment Modality: Psychoeducation  Interventions utilized were patient education  Purpose: insight or knowledge  Comment: Clinician introduced [employer] 101 video. Group members commented about what they learned.    Name: Mary Mayers YOB: 1970   MR: [id number]      Level of Participation: moderate  Quality of Participation: cooperative, engaged, and motivated  Mood/Affect: brightens with interaction and positive  Progress: Moderate  Plan: continue with services

## 2024-08-15 NOTE — GROUP NOTE
Group Topic: Dialectical Behavioral Therapy - Mindfulness   Group Date: 8/15/2024  Start Time:  9:00 AM  End Time: 10:00 AM  Facilitators: STEVEN Whitfield   Department: [facility] PRAVEEN [facility]    Number of Participants: 2   Treatment Modality: Dialectical Behavioral Therapy  Interventions utilized were group exercise  Purpose: coping skills  Comment: Clinician facilitated mindfulness meditation with group. The members participated and gave positive comments regarding being grateful snd their intention for the day.    Name: Mary Mayers YOB: 1970   MR: [id number]      Level of Participation: active  Quality of Participation: attentive, cooperative, and motivated  Mood/Affect: appropriate, brightens with interaction, and positive  Progress: Moderate  Plan: continue with services

## 2024-08-15 NOTE — GROUP NOTE
Group Topic: Chemical Dependency - Primary Disease   Group Date: 8/15/2024  Start Time:  1:00 PM  End Time:  2:00 PM  Facilitators: Mesha Crowe LPC   [facility]    Number of Participants: 2   Treatment Modality: Psychoeducation  Interventions utilized were group exercise  Purpose: insight or knowledge  Comment: Group members worked from written handout 'Slogans' which explores personal meaning behind several common 12-step slogans. Discussed purposeful nature of slogans being shortened versions of important ideas/concepts found in the Big Book.  Explored group members most helpful slogans vs. least helpful slogans.   Name: Mary Mayers YOB: 1970   MR: [id number]      Level of Participation: active  Quality of Participation: attentive  Mood/Affect: appropriate  Progress: Gaining insight or knowledge  Plan: continue with services

## 2024-08-15 NOTE — GROUP NOTE
Group Topic: Dialectical Behavioral Therapy - Mindfulness   Group Date: 8/15/2024  Start Time:  9:00 AM  End Time: 10:00 AM  Facilitators: STEVEN Whitfield   Department: [facility]    Number of Participants: 2   Treatment Modality: Dialectical Behavioral Therapy  Interventions utilized were group exercise  Purpose: coping skills  Comment: Clinician facilitated mindfulness meditation with group. The members participated and gave positive comments regarding being grateful snd their intention for the day.    Name: Mary Mayers YOB: 1970   MR: [id number]      Level of Participation: active  Quality of Participation: cooperative, engaged, and motivated  Mood/Affect: appropriate and positive  Progress: Moderate  Plan: continue with services

## 2024-08-15 NOTE — PROGRESS NOTES
ARS Clinical Progress Note      Name: Mary Mayers  MRN: [id number]   YOB: 1970    Date: 08/15/24    Record Review: Yes   OARRS Reviewed: Reviewed OARRS on 8/5/24-OARRS has been reviewed and is consistent with prescribed medications diagnosis.    Summary:  Mary  is a 52yo female presenting to [facility] today for treatment of Alcohol use disorder. She reports consuming a half gallon of wine every 2-3 days since her relapse several weeks ago. Last use Sunday 8/4 at 8pm had 1.5 glasses of wine. Ongoing cravings. Attending 12 step meetings daily. She endorses ongoing low mood and anxiety- some improvement. Did start psychotropic medications to target mood and cravings.; tolerating well.     IMPRESSION  Alcohol use disorder, severe  Major Depressive disorder  Generalized Anxiety  R/o PTSD    PLAN  Continue PHP- transition to IOP  Monitor mood for destabilization  Group psychotherapy  Group Psychoeducation  Family program  Continue Prozac to target mood  Recommend continued 12 step program  Continue home medication as prescribed  Follow up with PCP as scheduled  Rec individual therapy to target anxiety, grief  Ref to [facility] -consult for headache/anxiety    Treatment Plan/Recommendations: Continue current medications, follow up as scheduled    Diagnosis: Alcohol use disorder severe, MDD, DAMION    Complexity Issues:  Number of diagnoses or management options multiple  Problems effect on treatment and management yes  Risk of complications and/or morbidity or mortality moderate  Coordination of care (e.g. with patient and/or family, social workers, nursing staff, other doctors) >50% of visit    Patient Response to Treatment:  Risks, benefits, side effects, drug to drug interactions and alternatives to treatment were discussed in my usual manner: yes    Patient response to treatment fair  Reason for not reducing medication dose(s) high risk of patient's deterioration    Topics Discussed:  Nature  of diagnosis and/or prognosis, Aspects of aging process and relationship to the current problem, Nature of possible treatment options/drug interaction, Risk of non-treatment, Psychopharmacologic treatment options/possible benefits and risks, Nature of reasons for and possible benefits from psychotherapy, Family and/or situational stressors, Behavioral and/or environmental changes that might help, Medical records reviewed, Communication with facility staff, Referred for psychotherapy, and Forms/reports filled out    Psychotherapy/Counseling: encouraged mindfulness and relaxation techniques for anxiety mgm    Review with patient: Treatment plan reviewed with the patient.  Medication risks/benefit reviewed with the patient    Subjective   Chief Complaint: Alcohol use disorder, MDD    HPI:   Mary is a 52yo female with PMHx htn, SVT, hearing loss, depression, anxiety is presenting to [facility] today for treatment of alcohol use disorder. She has been consuming half gallon of wine 2-3 days per week.  Last consumption Sunday 8/4 around 8pm. She reports experiencing cravings; taking Naltrexone daily. She is attending 12 step meetings daily and making contacts. Patient reports mood has been depressed and anxious;  started Prozac Sunday. Sleep is improving but disrupted. Attending 12 step meetings daily. Mood is still depressed but feel some improvement; tolerating Prozac well. Will transition to IOP this upcoming week.      Physical/Somatic Complaints  The patient lists: headache    Past Psychiatric History:   Previous therapy: yes with [facility]  Previous psychiatric treatment and medication trials: yes - Zoloft, Hydroxyzine, Gabapentin, Naltrexone, Campral  Previous psychiatric hospitalizations: no  Previous diagnoses: yes - MDD, DAMION, PTSD  Previous suicide attempts: no  History of violence: no  Currently in treatment with n/a.  Education: college graduate  Other pertinent history: None  Depression screening was performed with  standardized tool: Yes - Depression PHQ9 16, DAMION 7 15    Medical History:  Past Medical History:   Diagnosis Date    Anxiety     Depression     Essential (primary) hypertension 10/21/2021    Benign essential hypertension    HL (hearing loss)     Hx of gastric bypass     Other seasonal allergic rhinitis     Seasonal allergies     Current Medications:     Current Outpatient Medications:     FLUoxetine (PROzac) 20 mg capsule, Take 1 capsule (20 mg) by mouth once daily., Disp: 30 capsule, Rfl: 0    folic acid (Folvite) 1 mg tablet, , Disp: , Rfl:     hydroCHLOROthiazide (HYDRODiuril) 12.5 mg tablet, , Disp: , Rfl:     hydrOXYzine HCL (Atarax) 50 mg tablet, , Disp: , Rfl:     losartan (Cozaar) 50 mg tablet, , Disp: , Rfl:     metoprolol succinate XL (Toprol-XL) 100 mg 24 hr tablet, TAKE ONE TABLET BY MOUTH ONE TIME DAILY, Disp: 90 tablet, Rfl: 0    traZODone (Desyrel) 50 mg tablet, , Disp: , Rfl:     Current Facility-Administered Medications:     folic acid (Folvite) tablet 1 mg, 1 mg, oral, Daily, Janina N Aaliyah, APRN-CNP, 1 mg at 08/14/24 1243    multivitamin 1 tablet, 1 tablet, oral, Daily, Janina N Aaliyah, APRN-CNP, 1 tablet at 08/14/24 1243    thiamine (Vitamin B-1) tablet 100 mg, 100 mg, oral, Daily, Janina N Aaliyah, APRN-CNP, 100 mg at 08/14/24 1244    Active Problems:  Patient Active Problem List    Diagnosis Date Noted    DAMION (generalized anxiety disorder) 06/04/2024    Mild episode of recurrent major depressive disorder (CMS-[facility]) 06/04/2024    Primary insomnia 06/04/2024    Alcoholism (Multi) 03/18/2024    Abnormal uterine bleeding (AUB) 07/07/2023    Anxiety 07/07/2023    Closed fracture of neck of right fibula 07/07/2023    Dizziness 07/07/2023    Hearing loss 07/07/2023    Paroxysmal SVT (supraventricular tachycardia) (CMS-[facility]) 07/07/2023    Injury of right knee 07/07/2023    Medication side effect 07/07/2023    Mixed conductive and sensorineural hearing loss of both ears 07/07/2023    Obesity  07/07/2023    PVC's (premature ventricular contractions) 07/07/2023    Syncope 07/07/2023    Tachycardia 07/07/2023    Tinnitus, right ear 07/07/2023    Trichomonas vaginalis infection 07/07/2023    Laxity of abdominal wall 05/05/2022    Laxity of skin 05/05/2022    Excess skin of abdomen 05/04/2022    Alcohol use disorder, severe (Multi) 01/03/2018    Benign essential hypertension 01/03/2018    History of bilateral tubal ligation 01/03/2018    Menorrhagia 04/10/2015    Fracture, foot 05/24/2010     Surgical History:  Past Surgical History:   Procedure Laterality Date    BREAST SURGERY      CHOLECYSTECTOMY  03/26/2018    Cholecystectomy Laparoscopic    COSMETIC SURGERY      OTHER SURGICAL HISTORY  04/08/2020    Gastric bypass surgery    OTHER SURGICAL HISTORY  03/16/2021    Vulvectomy    TUBAL LIGATION  03/26/2018    Tubal Ligation     Allergies:  Allergies   Allergen Reactions    Fish Containing Products Anaphylaxis    Lisinopril Cough    Latex Rash       Review of Systems:    Constitutional: Denies cough, weight changes, sweats or fever  Eyes/Ears/Nose/Throat: wears glasses denies difficulty hearing or pain in ears, denies sore throat or harshness, denies rhinorrhea  Respiratory: Denies shortness of breath   Cardiovascular: hx svt, htn  Gastrointestinal: Denies abdominal pain, nausea or vomiting  Genitourinary: Denies incontinence, pain or frequency with urination  Neurological: Denies numbness, tingling, tremor. Denies hx seizures  Hematologic/Lymphatic: Denies bleeding disorders  Endocrine: denies hx thyroid disease or diabetes  Musculoskeletal: gait steady  Integumentary: skin warm and dry, no rash or lesions noted     Family History:  Family History   Problem Relation Name Age of Onset    Colon cancer Mother Cintia     Alcohol abuse Father Sawyer     Stroke Father Sawyer      Social History:  Social History     Socioeconomic History    Marital status: Single     Spouse name: Not on file    Number of children: Not  on file    Years of education: Not on file    Highest education level: Not on file   Occupational History    Occupation: Nurse   Tobacco Use    Smoking status: Former     Types: Cigarettes    Smokeless tobacco: Current   Vaping Use    Vaping status: Some Days   Substance and Sexual Activity    Alcohol use: Yes     Alcohol/week: 10.0 standard drinks of alcohol     Types: 10 Glasses of wine per week     Comment: 4-5 glasses of wine/day    Drug use: Never    Sexual activity: Not on file   Other Topics Concern    Not on file   Social History Narrative    Not on file     Social Determinants of Health     Financial Resource Strain: Not on File (2023)    Received from [facility]    Financial Resource Strain     Financial Resource Strain: 0   Food Insecurity: Not on File (2023)    Received from [facility]    Food Insecurity     Food: 0   Transportation Needs: Not on File (2023)    Received from [facility]    Transportation Needs     Transportation: 0   Physical Activity: Not on File (2023)    Received from [facility]    Physical Activity     Physical Activity: 0   Stress: Not on File (2023)    Received from [facility]    Stress     Stress: 0   Social Connections: Not on File (2023)    Received from [facility]    Social Connections     Social Connections and Isolation: 0   Intimate Partner Violence: Not on file   Housing Stability: Not on File (2023)    Received from [facility]    Housing Stability     Housin       Psychiatric Review Of Systems  Japonica reports history of depression and anxiety for several years.  Depression is characterized by experiencing low mood, anhedonia, low motivation, increased sleep, decreased energy, isolation. Denies wishes for death or consideration for suicide.  CSSRS negative. Also reports experiencing anxiety characterized by uncontrolled worry, mind racing, restlessness, heart racing. Reports hx of panic attacks recently characterized by sob,  "sweats. Denies ever experiencing rosalba of psychosis. Denies hx hospitalization.   Objective        Mental Status Exam:   The patient is alert, pleasant, and cooperative. Well-groomed and maintains good eye contact. Insight and judgment are fair. Oriented to time, place, and person. Speech is goal-directed, coherent, and of normal rate and tone. Thought process is logical. Mood is \"ok\" and affect is congruent. Denies auditory or visual hallucinations. No overt signs of psychosis or rosalba noted. Denies wishes for death or consideration for suicide. Denies thoughts of violence toward others.     PHYSICAL EXAM  GENERAL: Alert and oriented x 3. No acute distress. Well-nourished.  HEENT: Moist mucous membranes. No scleral icterus.   LUNGS:  No accessory muscle use.  SKIN: No rashes or lesions. Warm and Dry  NEUROLOGIC: No tremor noted.  PSYCHIATRIC: Cooperative. Appropriate mood and affect.                 Wt Readings from Last 1 Encounters:   08/05/24 73.9 kg (163 lb)           Total time on date of patient encounter 30  KACY Taveras-CNP      "

## 2024-08-16 ENCOUNTER — MULTIDISCIPLINARY VISIT (OUTPATIENT)
Dept: BEHAVIORAL HEALTH | Facility: CLINIC | Age: 54
End: 2024-08-16
Payer: COMMERCIAL

## 2024-08-16 ENCOUNTER — APPOINTMENT (OUTPATIENT)
Dept: PRIMARY CARE | Facility: CLINIC | Age: 54
End: 2024-08-16
Payer: COMMERCIAL

## 2024-08-16 VITALS
SYSTOLIC BLOOD PRESSURE: 116 MMHG | BODY MASS INDEX: 30.55 KG/M2 | OXYGEN SATURATION: 96 % | HEART RATE: 64 BPM | DIASTOLIC BLOOD PRESSURE: 78 MMHG | HEIGHT: 62 IN | WEIGHT: 166 LBS

## 2024-08-16 DIAGNOSIS — I10 BENIGN ESSENTIAL HYPERTENSION: ICD-10-CM

## 2024-08-16 DIAGNOSIS — F32.A ANXIETY AND DEPRESSION: ICD-10-CM

## 2024-08-16 DIAGNOSIS — F10.20 ALCOHOL USE DISORDER, SEVERE (MULTI): ICD-10-CM

## 2024-08-16 DIAGNOSIS — Z00.00 ANNUAL PHYSICAL EXAM: Primary | ICD-10-CM

## 2024-08-16 DIAGNOSIS — Z12.11 ENCOUNTER FOR COLONOSCOPY IN PATIENT WITH FAMILY HISTORY OF COLON CANCER: ICD-10-CM

## 2024-08-16 DIAGNOSIS — F41.9 ANXIETY AND DEPRESSION: ICD-10-CM

## 2024-08-16 DIAGNOSIS — Z12.31 SCREENING MAMMOGRAM, ENCOUNTER FOR: ICD-10-CM

## 2024-08-16 DIAGNOSIS — Z80.0 ENCOUNTER FOR COLONOSCOPY IN PATIENT WITH FAMILY HISTORY OF COLON CANCER: ICD-10-CM

## 2024-08-16 LAB — ETHYL GLUCURONIDE UR QL SCN: NEGATIVE NG/ML

## 2024-08-16 PROCEDURE — 3074F SYST BP LT 130 MM HG: CPT | Performed by: NURSE PRACTITIONER

## 2024-08-16 PROCEDURE — 3078F DIAST BP <80 MM HG: CPT | Performed by: NURSE PRACTITIONER

## 2024-08-16 PROCEDURE — 99396 PREV VISIT EST AGE 40-64: CPT | Performed by: NURSE PRACTITIONER

## 2024-08-16 PROCEDURE — 90853 GROUP PSYCHOTHERAPY: CPT

## 2024-08-16 PROCEDURE — 3008F BODY MASS INDEX DOCD: CPT | Performed by: NURSE PRACTITIONER

## 2024-08-16 RX ORDER — LOSARTAN POTASSIUM AND HYDROCHLOROTHIAZIDE 12.5; 5 MG/1; MG/1
1 TABLET ORAL DAILY
COMMUNITY
End: 2024-08-16 | Stop reason: SDUPTHER

## 2024-08-16 RX ORDER — LOSARTAN POTASSIUM AND HYDROCHLOROTHIAZIDE 12.5; 5 MG/1; MG/1
1 TABLET ORAL DAILY
Qty: 90 TABLET | Refills: 3 | Status: SHIPPED | OUTPATIENT
Start: 2024-08-16 | End: 2025-08-16

## 2024-08-16 RX ORDER — METOPROLOL SUCCINATE 100 MG/1
100 TABLET, EXTENDED RELEASE ORAL DAILY
Qty: 90 TABLET | Refills: 3 | Status: SHIPPED | OUTPATIENT
Start: 2024-08-16 | End: 2025-08-16

## 2024-08-16 RX ORDER — NALTREXONE HYDROCHLORIDE 50 MG/1
50 TABLET, FILM COATED ORAL DAILY
COMMUNITY
Start: 2024-06-27

## 2024-08-16 ASSESSMENT — ENCOUNTER SYMPTOMS
CONFUSION: 0
FACIAL ASYMMETRY: 0
PSYCHIATRIC NEGATIVE: 1
DEPRESSION: 1
CARDIOVASCULAR NEGATIVE: 1
SLEEP DISTURBANCE: 0
DIZZINESS: 0
MUSCULOSKELETAL NEGATIVE: 1
WEAKNESS: 0
POLYPHAGIA: 0
NERVOUS/ANXIOUS: 0
NEUROLOGICAL NEGATIVE: 1
HEMATOLOGIC/LYMPHATIC NEGATIVE: 1
WOUND: 0
RESPIRATORY NEGATIVE: 1
EYES NEGATIVE: 1
GASTROINTESTINAL NEGATIVE: 1
ALLERGIC/IMMUNOLOGIC NEGATIVE: 1
CONSTITUTIONAL NEGATIVE: 1
LIGHT-HEADEDNESS: 0
ENDOCRINE NEGATIVE: 1

## 2024-08-16 NOTE — GROUP NOTE
Group Topic: Dialectical Behavioral Therapy - Mindfulness   Group Date: 8/16/2024  Start Time:  9:00 AM  End Time: 10:00 AM  Facilitators: Chano Loyd RN   Department: [facility]    Number of Participants: 2   Treatment Modality: Psychoeducation  Interventions utilized were mental fitness and patient education  Purpose: coping skills, self-care, and relapse prevention strategies  Comment: Mindfulness group began with checking in with each group member. Discussed AA meetings, weekend structure, potential roadblocks for the weekend, and how to incorporate mindfulness/meditation into daily routine. Next a reading that discussed the value of silence. Group members reflected on the reading. Meditation today focused on deep relaxation with visualization and brief body scan. Members then discussed the meditation and then set an intention for the day/weekend and stated a gratitude for the day     Name: Mary Mayers YOB: 1970   MR: [id number]      Level of Participation: active  Quality of Participation: appropriate/pleasant and engaged  Mood/Affect: appropriate  Progress: Gaining insight or knowledge  Plan: changes to treatment plan patient transitioned to [facility] level of care today

## 2024-08-16 NOTE — GROUP NOTE
Group Topic: Chemical Dependency - Primary Disease   Group Date: 8/16/2024  Start Time: 10:00 AM  End Time: 11:00 AM  Facilitators: BARRY Deleon   Department: [facility]    Number of Participants: 2   Treatment Modality: Psychoeducation  Interventions utilized were patient education  Purpose: insight or knowledge  Comment: 60 minute Education Group.  Patients learned basic introduction to CBT including connection between thoughts, emotions, and behavior. Patient learned to catch, check and change distorted thinking. In particular, material was applied to addiction and relapse prevention. Patient was attentive throughout session and participated actively in the discussion. Patient displayed their understanding of the material by identifying at least one cognitive distortion they identify with, providing specific examples.    Name: Mary Mayers YOB: 1970   MR: [id number]      Level of Participation: when cued  Quality of Participation: appropriate/pleasant, attentive, cooperative, and engaged  Mood/Affect: appropriate  Progress: Gaining insight or knowledge  Plan: continue with services

## 2024-08-16 NOTE — PROGRESS NOTES
"Subjective   Patient ID: Mary Mayers is a 53 y.o. female who presents for Establish Care.    HPI   Mary is 52 y/o female here to [facility].   No complaints noted.     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 She has 3 adult children.   Does not smoke sober since AUG 4th!     Review of Systems   Constitutional: Negative.    HENT: Negative.     Eyes: Negative.    Respiratory: Negative.     Cardiovascular: Negative.    Gastrointestinal: Negative.    Endocrine: Negative.  Negative for polyphagia.   Genitourinary: Negative.    Musculoskeletal: Negative.    Skin: Negative.  Negative for pallor, rash and wound.   Allergic/Immunologic: Negative.    Neurological: Negative.  Negative for dizziness, facial asymmetry, weakness and light-headedness.   Hematological: Negative.    Psychiatric/Behavioral: Negative.  Negative for confusion, sleep disturbance and suicidal ideas. The patient is not nervous/anxious.    All other systems reviewed and are negative.      Objective   /78   Pulse 64   Ht 1.575 m (5' 2\")   Wt 75.3 kg (166 lb)   SpO2 96%   BMI 30.36 kg/m²     Physical Exam  Vitals and nursing note reviewed.   Constitutional:       Appearance: Normal appearance. She is normal weight.   HENT:      Head: Normocephalic.      Nose: Nose normal.      Mouth/Throat:      Mouth: Mucous membranes are moist.   Eyes:      Extraocular Movements: Extraocular movements intact.      Conjunctiva/sclera: Conjunctivae normal.      " Pupils: Pupils are equal, round, and reactive to light.   Cardiovascular:      Rate and Rhythm: Normal rate and regular rhythm.      Pulses: Normal pulses.      Heart sounds: Normal heart sounds.   Pulmonary:      Effort: Pulmonary effort is normal.   Abdominal:      General: Abdomen is flat. Bowel sounds are normal.      Palpations: Abdomen is soft.   Musculoskeletal:         General: Normal range of motion.      Cervical back: Normal range of motion.   Skin:     General: Skin is warm and dry.   Neurological:      General: No focal deficit present.      Mental Status: She is alert and oriented to person, place, and time.   Psychiatric:         Mood and Affect: Mood normal.         Behavior: Behavior normal.         Assessment/Plan   1. Encounter for colonoscopy in patient with family history of colon cancer  - Colonoscopy Screening; High Risk Patient; mother hx of colon Ca in age 60s; Future    2. Screening mammogram, encounter for  - BI mammo bilateral screening tomosynthesis; Future    3. Annual physical exam  - Lipid Panel; Future  - Comprehensive metabolic panel; Future    4. Anxiety and depression  - Vitamin D 25-Hydroxy,Total (for eval of Vitamin D levels); Future  - Vitamin B12; Future    5. Benign essential hypertension  - losartan-hydrochlorothiazide (Hyzaar) 50-12.5 mg tablet; Take 1 tablet by mouth once daily.  Dispense: 90 tablet; Refill: 3  - metoprolol succinate XL (Toprol-XL) 100 mg 24 hr tablet; Take 1 tablet (100 mg) by mouth once daily.  Dispense: 90 tablet; Refill: 3        FU In 6 months    Patient was identified as a fall risk. Risk prevention instructions provided.

## 2024-08-16 NOTE — GROUP NOTE
Group Topic: Chemical Dependency - Relapse   Group Date: 8/16/2024  Start Time: 11:00 AM  End Time: 12:00 PM  Facilitators: BARRY Deleon   Department: [facility]    Number of Participants: 2   Treatment Modality: Cognitive Behavioral Therapy and Dialectical Behavioral Therapy  Interventions utilized were exploration, reality testing, and support  Purpose: feelings, communication skills, and trigger / craving management  Comment: Group therapy with members of the PHP and [facility] levels of treatment.  Started the group with a reading from “Keep It Simple.”  The reading talked about recovery being a life-long lawson and the need to pace ourselves in recovery.  Group members shared what they could relate to from today's reading.  Group members then provided recovery updates including any recent and/or current sobriety threats.       Name: Mary Mayers YOB: 1970   MR: [id number]      Level of Participation: active  Quality of Participation: appropriate/pleasant, attentive, cooperative, and engaged  Mood/Affect: appropriate  Progress: Gaining insight or knowledge.  Patient was active and engaged in today's group therapy session.  She shared with the group about her efforts to take her life and recovery one day at a time.  The group was supportive of this disclosure.  Patient also talked about her upcoming weekend, and her plan to remain sober.  Patient reports continued sobriety and AA meeting attendance.    Plan: continue with services

## 2024-08-19 ENCOUNTER — MULTIDISCIPLINARY VISIT (OUTPATIENT)
Dept: BEHAVIORAL HEALTH | Facility: CLINIC | Age: 54
End: 2024-08-19
Payer: COMMERCIAL

## 2024-08-19 DIAGNOSIS — F10.20 ALCOHOL USE DISORDER, SEVERE (MULTI): Primary | ICD-10-CM

## 2024-08-19 PROCEDURE — 90853 GROUP PSYCHOTHERAPY: CPT

## 2024-08-19 NOTE — GROUP NOTE
Group Topic: Dialectical Behavioral Therapy - Mindfulness   Group Date: 8/19/2024  Start Time:  9:00 AM  End Time: 10:00 AM  Facilitators: BARRY Deleon   [facility]    Number of Participants: 5   Treatment Modality: Dialectical Behavioral Therapy  Interventions utilized were exploration, group exercise, and support  Purpose: feelings, communication skills, and trigger / craving management  Comment: Patient participated in a mindfulness meditation exercise and a group check-in.  The purpose and benefits of mindfulness meditation in strengthening relapse prevention skills was explained.  A mindfulness meditation exercise was conducted, and patient provided feedback in the form of personal observations from the exercise.      Name: Mary Mayers YOB: 1970   MR: [id number]      Level of Participation: when cued  Quality of Participation: appropriate/pleasant, attentive, cooperative, and engaged  Mood/Affect: appropriate  Progress: Gaining insight or knowledge  Plan: continue with services.  Patient will continue with IOP services.

## 2024-08-19 NOTE — GROUP NOTE
Group Topic: Chemical Dependency - Relapse   Group Date: 8/19/2024  Start Time: 11:00 AM  End Time: 12:00 PM  Facilitators: BARRY Deleon   Department: [facility]    Number of Participants: 12   Treatment Modality: Cognitive Behavioral Therapy and Dialectical Behavioral Therapy  Interventions utilized were exploration, reality testing, and support  Purpose: feelings, communication skills, and trigger / craving management  Comment: Group therapy with members of the [facility], IOP, and aftercare levels of treatment.  Started the group with a reading from “Body, Mind, and Spirt.”  The reading talked about finding peace and charisse in recovery.  Group members shared what they could relate to from today's reading.  Group members then provided recovery updates including any recent and/or current sobriety threats.      Name: Mary Mayers YOB: 1970   MR: [id number]      Level of Participation: active  Quality of Participation: appropriate/pleasant, attentive, cooperative, and engaged  Mood/Affect: appropriate  Progress: Gaining insight or knowledge.  Patient was active and engaged in today's group therapy session.  Patient reports ongoing abstinence and AA meeting attendance.  Patient was very supportive of her fellow group members in today's session.    Plan: continue with services.  Patient will continue with [facility] services.

## 2024-08-19 NOTE — GROUP NOTE
Group Topic: Personal Responsibility   Group Date: 8/19/2024  Start Time: 10:00 AM  End Time: 11:00 AM  Facilitators: BARRY Alvares   Department:  [facility]    Number of Participants: 5   Group Focus: chemical dependency education  Treatment Modality: Solution-Focused Therapy  Interventions utilized were group exercise  Purpose: insight or knowledge    Name: Mary Mayers YOB: 1970   MR: [id number]      Facilitator: Licensed Professional Counselor  Level of Participation: minimal  Quality of Participation: withdrawn  Interactions with others: appropriate  Mood/Affect: flat  Triggers (if applicable): NA  Cognition:  Silent  Progress: Minimal  Comments: This was disease concept lecture. Patient was silent and did not participate verbaly.   Plan: continue with services

## 2024-08-20 ENCOUNTER — APPOINTMENT (OUTPATIENT)
Dept: BEHAVIORAL HEALTH | Facility: CLINIC | Age: 54
End: 2024-08-20
Payer: COMMERCIAL

## 2024-08-21 ENCOUNTER — MULTIDISCIPLINARY VISIT (OUTPATIENT)
Dept: BEHAVIORAL HEALTH | Facility: CLINIC | Age: 54
End: 2024-08-21
Payer: COMMERCIAL

## 2024-08-21 DIAGNOSIS — F10.20 ALCOHOL USE DISORDER, SEVERE (MULTI): ICD-10-CM

## 2024-08-21 PROCEDURE — 80346 BENZODIAZEPINES1-12: CPT

## 2024-08-21 PROCEDURE — 80365 DRUG SCREENING OXYCODONE: CPT

## 2024-08-21 PROCEDURE — 90853 GROUP PSYCHOTHERAPY: CPT

## 2024-08-21 PROCEDURE — 80307 DRUG TEST PRSMV CHEM ANLYZR: CPT

## 2024-08-21 NOTE — GROUP NOTE
Group Topic: Chemical Dependency - Relapse   Group Date: 8/21/2024  Start Time: 11:00 AM  End Time: 12:00 PM  Facilitators: BARRY Deleon   Department: [facility] PRAVEEN [facility]    Number of Participants: 3   Treatment Modality: Cognitive Behavioral Therapy and Dialectical Behavioral Therapy  Interventions utilized were exploration, reality testing, and support  Purpose: feelings, communication skills, and trigger / craving management  Comment: Group therapy with members of the [facility] level of treatment.  Started the group with a reading from “Body, Mind, and Spirit.”  The reading talked about using the “tools” of recovery.  Group members shared what they could relate to from today's reading.  Group members then provided recovery updates including any recent and/or current sobriety threats.     Name: Mary Mayers YOB: 1970   MR: [id number]      Level of Participation: active  Quality of Participation: appropriate/pleasant, attentive, cooperative, and engaged  Mood/Affect: appropriate  Progress: Gaining insight or knowledge.  Patient was active and engaged in today's group therapy session.  Patient shared at length about a recent potential sobriety threat that she was able to successfully navigate.  The group was very supportive of her in this disclosure.  Patient reports ongoing sobriety and AA meeting attendance.    Plan: continue with services

## 2024-08-21 NOTE — GROUP NOTE
Group Topic: Chemical Dependency - Relapse   Group Date: 8/21/2024-[facility]  Start Time: 10:00 AM  End Time: 11:00 AM  Facilitators: BLANK Cotto   [facility]    Number of Participants: 3   Treatment Modality: Psychoeducation  Interventions utilized were exploration and group exercise  Purpose: coping skills and relapse prevention strategies  Comment: Education    Relapse Process and Prevention Planning  Goal os this session is to educate about relapse as a process characterized by changes in attitude. Handout distributed and reviewed together as a group. Discussion ensued and participants shared personal examples relating to content. Members completed relapse prevention plan cards where they listed concrete identified support persons, sober places to go, behaviors that support continued abstinence/managing craving and a saying or a prayer.     KARELY CHEN LICDC-CS    Name: Mary Mayers YOB: 1970   MR: [id number]      Level of Participation: active  Quality of Participation: attentive, engaged, and initiates communication  Mood/Affect: appropriate and bright  Progress: Gaining insight or knowledge  Plan: continue with services

## 2024-08-21 NOTE — GROUP NOTE
Group Topic: Dialectical Behavioral Therapy - Mindfulness   Group Date: 8/21/2024  Start Time:  9:00 AM  End Time: 10:00 AM  Facilitators: BARRY Deleon   Department: [facility] PR[facility]EN [facility]    Number of Participants: 3   Treatment Modality: Dialectical Behavioral Therapy  Interventions utilized were exploration, group exercise, and support  Purpose: communication skills, insight or knowledge, and trigger / craving management  Comment: Patient participated in a mindfulness meditation exercise and a group check-in.  The purpose and benefits of mindfulness meditation in strengthening relapse prevention skills was explained.  A mindfulness meditation exercise was conducted, and patient provided feedback in the form of personal observations from the exercise.      Name: Mary Mayers YOB: 1970   MR: [id number]      Level of Participation: when cued  Quality of Participation: appropriate/pleasant, attentive, cooperative, and engaged  Mood/Affect: appropriate  Progress: Gaining insight or knowledge  Plan: continue with services

## 2024-08-22 ENCOUNTER — MULTIDISCIPLINARY VISIT (OUTPATIENT)
Dept: BEHAVIORAL HEALTH | Facility: CLINIC | Age: 54
End: 2024-08-22
Payer: COMMERCIAL

## 2024-08-22 DIAGNOSIS — F10.20 ALCOHOL USE DISORDER, SEVERE (MULTI): ICD-10-CM

## 2024-08-22 PROCEDURE — 90853 GROUP PSYCHOTHERAPY: CPT

## 2024-08-22 NOTE — GROUP NOTE
Group Topic: Chemical Dependency - Relapse   Group Date: 8/22/2024 IOP  Start Time: 11:00 AM  End Time: 12:00 PM  Facilitators: BLANK Cotto   [facility]    Number of Participants: 6   Treatment Modality: Cognitive Behavioral Therapy, Psychoeducation, and Skills Training  Interventions utilized were patient education, problem solving, and support  Purpose: coping skills, feelings, and relapse prevention strategies  Comment: Group Counseling  Group began with a meditative reading regarding honesty with self and others and pts. were encouraged to reflect and respond. Members were encouraged to provide the group with an update regarding their recovery progress, as well as discuss shared experiences and observations.  Several introductions were given for newer members. A coin ceremony was held at the end of group for a member completing IOP today.   Committed to contacting AA support today by phone. Utilized group time to share increased risk for her with being alone. Friday we will discuss treatment plan for next week.   Name: Mary Mayers YOB: 1970   MR: [id number]      Level of Participation: active  Quality of Participation: engaged  Mood/Affect: appropriate  Progress: Gaining insight or knowledge  Plan: continue with services

## 2024-08-22 NOTE — GROUP NOTE
Group Topic: Loss and Grief Issues   Group Date: 8/22/2024 [facility]  Start Time: 10:00 AM  End Time: 11:00 AM  Facilitators: BLANK Cotto   [facility]    Number of Participants: 6   Treatment Modality: Cognitive Behavioral Therapy and Psychoeducation  Interventions utilized were patient education and support  Purpose: coping skills, feelings, and insight or knowledge  Comment: grief in addiction recovery    Goal of this session is to educate regarding grief process in addiction recovery. Video on this topic included typical losses experienced by both family and addicted person, stages of grief and emotional coping skills. Discussion held throughout.     Name: Mary Mayers YOB: 1970   MR: [id number]      Level of Participation: moderate  Quality of Participation: attentive and engaged  Mood/Affect: appropriate  Progress: Gaining insight or knowledge  Plan: continue with services

## 2024-08-22 NOTE — GROUP NOTE
Group Topic: Dialectical Behavioral Therapy - Mindfulness   Group Date: 8/22/2024-[facility]  Start Time:  9:00 AM  End Time: 10:00 AM  Facilitators: BLANK Cotto   [facility]    Number of Participants: 6   Treatment Modality: Dialectical Behavioral Therapy  Interventions utilized were group exercise  Purpose: coping skills  Comment: Mindfulness and check in  Group began with a meditative reading about        and pts. were encouraged to reflect and respond.  This was followed by a brief explanation of the potential benefits of mindfulness.  An experiential exercise was completed and afterwards members were asked to share a non-judgmental observation about their experience as well as a daily gratitude. Members shared an update regarding recovery progress.     Name: Mary Mayers YOB: 1970   MR: [id number]      Level of Participation: active  Quality of Participation: engaged  Mood/Affect: appropriate  Progress: Gaining insight or knowledge  Plan: continue with services

## 2024-08-23 ENCOUNTER — MULTIDISCIPLINARY VISIT (OUTPATIENT)
Dept: BEHAVIORAL HEALTH | Facility: CLINIC | Age: 54
End: 2024-08-23
Payer: COMMERCIAL

## 2024-08-23 DIAGNOSIS — F10.20 ALCOHOL USE DISORDER, SEVERE (MULTI): ICD-10-CM

## 2024-08-23 LAB
1OH-MIDAZOLAM UR CFM-MCNC: <25 NG/ML
6MAM UR CFM-MCNC: <25 NG/ML
7AMINOCLONAZEPAM UR CFM-MCNC: <25 NG/ML
A-OH ALPRAZ UR CFM-MCNC: <25 NG/ML
ALPRAZ UR CFM-MCNC: <25 NG/ML
CHLORDIAZEP UR CFM-MCNC: <25 NG/ML
CLONAZEPAM UR CFM-MCNC: <25 NG/ML
CODEINE UR CFM-MCNC: <50 NG/ML
DIAZEPAM UR CFM-MCNC: <25 NG/ML
ETHYL GLUCURONIDE UR QL SCN: NEGATIVE NG/ML
HYDROCODONE CTO UR CFM-MCNC: <25 NG/ML
HYDROMORPHONE UR CFM-MCNC: <25 NG/ML
LORAZEPAM UR CFM-MCNC: <25 NG/ML
MIDAZOLAM UR CFM-MCNC: <25 NG/ML
MORPHINE UR CFM-MCNC: <50 NG/ML
NORDIAZEPAM UR CFM-MCNC: <25 NG/ML
NORHYDROCODONE UR CFM-MCNC: <25 NG/ML
NOROXYCODONE UR CFM-MCNC: <25 NG/ML
OXAZEPAM UR CFM-MCNC: <25 NG/ML
OXYCODONE UR CFM-MCNC: <25 NG/ML
OXYMORPHONE UR CFM-MCNC: <25 NG/ML
TEMAZEPAM UR CFM-MCNC: <25 NG/ML

## 2024-08-23 PROCEDURE — 90853 GROUP PSYCHOTHERAPY: CPT

## 2024-08-23 NOTE — GROUP NOTE
Group Topic: Chemical Dependency - Primary Disease   Group Date: 8/23/2024  Start Time: 10:00 AM  End Time: 11:00 AM  Facilitators: BARRY Deleon   Department: [facility]    Number of Participants: 2   Treatment Modality: Psychoeducation  Interventions utilized were patient education  Purpose: insight or knowledge  Comment: Education group with members of the PHP and IOP levels of treatment.  The group watched a short video on the concepts of shame, vulnerability, and connection.  The group then discussed these topics at length, with particular attention on the importance of connection with others in the recovery process.  Patient was attentive, and also actively participated in the group discussion.     Name: Mary Mayers YOB: 1970   MR: [id number]      Level of Participation: when cued  Quality of Participation: appropriate/pleasant, attentive, cooperative, and engaged  Mood/Affect: appropriate  Progress: Gaining insight or knowledge  Plan: continue with services.  Patient will continue with [facility] services.

## 2024-08-23 NOTE — GROUP NOTE
Group Topic: Dialectical Behavioral Therapy - Mindfulness   Group Date: 8/23/2024 IOP  Start Time:  9:00 AM  End Time: 10:00 AM  Facilitators: BLANK Cotto   [facility]    Number of Participants: 2   Treatment Modality: Dialectical Behavioral Therapy and Psychoeducation  Interventions utilized were group exercise, patient education, and problem solving  Purpose: coping skills and insight or knowledge  Comment: Mindfulness and check in  Group began with a meditative reading nd pts. were encouraged to reflect and respond.  This was followed by a brief explanation of the potential benefits of mindfulness.  An experiential exercise was completed and afterwards members were asked to share a non-judgmental observation about their experience as well as a daily gratitude. Members shared an update regarding recovery progress.   Self related to reading by sharing about the current dynamics in her relationship. Staff normalized these early recovery experiences.   Name: Mary Mayers YOB: 1970   MR: [id number]      Level of Participation: active  Quality of Participation: engaged  Mood/Affect: appropriate  Progress: Gaining insight or knowledge  Plan: continue with services

## 2024-08-23 NOTE — GROUP NOTE
Group Topic: Chemical Dependency - Relapse   Group Date: 8/23/2024  Start Time: 11:00 AM  End Time: 12:00 PM  Facilitators: BARRY Deleon   Department: [facility]    Number of Participants: 2   Treatment Modality: Cognitive Behavioral Therapy and Dialectical Behavioral Therapy  Interventions utilized were exploration, reality testing, and support  Purpose: feelings, communication skills, and trigger / craving management  Comment: Group therapy with patients from the [facility] and [facility] levels of treatment.  Started the group with a reading from “Body, Mind, and Spirit.”  The reading talked about the importance of our attitude in recovery.  Group members shared what they could relate to from today's reading.  Group members then provided recovery updates including any recent and/or current sobriety threats.       Name: Mary Mayers YOB: 1970   MR: [id number]      Level of Participation: active  Quality of Participation: appropriate/pleasant, attentive, cooperative, and engaged  Mood/Affect: appropriate  Progress: Gaining insight or knowledge.  Patient was active and engaged in today's group therapy session.  Patient shared with the group about her recovery plans for the upcoming weekend.  This included a couple of AA meetings and a meet up with a potential sponsor.  The group supported her in this plan.  Patient reports ongoing sobriety.    Plan: continue with services.  Patient will continue with [facility] services.

## 2024-08-26 ENCOUNTER — MULTIDISCIPLINARY VISIT (OUTPATIENT)
Dept: BEHAVIORAL HEALTH | Facility: CLINIC | Age: 54
End: 2024-08-26
Payer: COMMERCIAL

## 2024-08-26 DIAGNOSIS — F10.20 ALCOHOL USE DISORDER, SEVERE (MULTI): Primary | ICD-10-CM

## 2024-08-26 DIAGNOSIS — F41.1 GAD (GENERALIZED ANXIETY DISORDER): ICD-10-CM

## 2024-08-26 PROCEDURE — 90853 GROUP PSYCHOTHERAPY: CPT

## 2024-08-26 NOTE — GROUP NOTE
Group Topic: Chemical Dependency - Relapse   Group Date: 8/26/2024 -[facility]  Start Time: 10:00 AM  End Time: 11:00 AM  Facilitators: BLANK Cotto   [facility]    Number of Participants: 7   Treatment Modality: Psychoeducation  Interventions utilized were assignment, exploration, and group exercise  Purpose: coping skills and relapse prevention   Comment: 8 stages of the relapse process    Goal of this session is to educate and develop coping skills/prevention regarding the relapse process.  Handouts distributed and reviewed as a group with participants rating their own level of each stage of relapse and identifying personally with their specific indications of the stage. Coping skills discussed as a group and participants identified at least one for the areas.   Unusually quiet.  Name: Mary Mayers YOB: 1970   MR: [id number]      Level of Participation: withdrawn  Quality of Participation: quiet  Mood/Affect: depressed  Progress: Minimal  Plan: continue with services

## 2024-08-26 NOTE — GROUP NOTE
Group Topic: Dialectical Behavioral Therapy - Mindfulness   Group Date: 8/26/2024  Start Time:  9:00 AM  End Time: 10:00 AM  Facilitators: BARRY Deleon   [facility]    Number of Participants: 7   Treatment Modality: Dialectical Behavioral Therapy  Interventions utilized were exploration, group exercise, and support  Purpose: feelings, communication skills, insight or knowledge, and trigger / craving management  Comment: Patient participated in a mindfulness meditation exercise and a group check-in.  The purpose and benefits of mindfulness meditation in strengthening relapse prevention skills was explained.  A mindfulness meditation exercise was conducted, and patient provided feedback in the form of personal observations from the exercise.    Name: Mary Mayers YOB: 1970   MR: [id number]      Level of Participation: when cued  Quality of Participation: appropriate/pleasant, attentive, cooperative, and engaged  Mood/Affect: appropriate  Progress: Gaining insight or knowledge  Plan: continue with services.  Continue with IOP services.

## 2024-08-26 NOTE — GROUP NOTE
Group Topic: Chemical Dependency - Relapse   Group Date: 8/26/2024  Start Time: 11:00 AM  End Time: 12:00 PM  Facilitators: BARRY Deleon; BLANK Cotto   Department: [facility]    Number of Participants: 13   Treatment Modality: Cognitive Behavioral Therapy and Dialectical Behavioral Therapy  Interventions utilized were exploration, reality testing, and support  Purpose: feelings, communication skills, and trigger / craving management  Comment: Group therapy with members of the [facility], IOP, and aftercare levels of treatment.  Started the group with introductions.  There was then a reading from “Body, Mind, and Spirit.”  The reading talked about spirituality in recovery.  Group members shared what they could relate to from today's reading.  Next, group members provided recovery updates including any recent and/or current sobriety threats.      Name: Mary Mayers YOB: 1970   MR: [id number]      Level of Participation: active  Quality of Participation: appropriate/pleasant, attentive, cooperative, and engaged  Mood/Affect: appropriate  Progress: Gaining insight or knowledge.  Patient was active in today's group therapy session.  She shared with the group about being anxious regarding her return to work.  The group was very supportive.  Patient reports continued sobriety and AA meeting attendance.    Plan: continue with services.  Patient will continue with [facility] services.

## 2024-08-28 ENCOUNTER — MULTIDISCIPLINARY VISIT (OUTPATIENT)
Dept: BEHAVIORAL HEALTH | Facility: CLINIC | Age: 54
End: 2024-08-28
Payer: COMMERCIAL

## 2024-08-28 DIAGNOSIS — F10.20 ALCOHOL USE DISORDER, SEVERE (MULTI): ICD-10-CM

## 2024-08-28 DIAGNOSIS — F41.1 GAD (GENERALIZED ANXIETY DISORDER): ICD-10-CM

## 2024-08-28 PROCEDURE — 80307 DRUG TEST PRSMV CHEM ANLYZR: CPT

## 2024-08-28 PROCEDURE — 90853 GROUP PSYCHOTHERAPY: CPT

## 2024-08-28 NOTE — GROUP NOTE
Group Topic: Dialectical Behavioral Therapy - Mindfulness   Group Date: 8/28/2024-[facility]  Start Time:  9:00 AM  End Time: 10:00 AM  Facilitators: BLANK Cotto   [facility]    Number of Participants: 7   Treatment Modality: Dialectical Behavioral Therapy  Interventions utilized were group exercise and patient education  Purpose: coping skills and insight or knowledge  Comment: Mindfulness and check in  Group began with a meditative reading and pts. were encouraged to reflect and respond.  This was followed by a brief explanation of the potential benefits of mindfulness.  An experiential exercise was completed and afterwards members were asked to share a non-judgmental observation about their experience as well as a daily gratitude. Members shared an update regarding recovery progress.       Name: Mary Mayers YOB: 1970   MR: [id number]      Level of Participation: when cued    Quality of Participation: attentive and quiet  Mood/Affect: flat  Progress: Moderate  Plan: continue with services

## 2024-08-28 NOTE — GROUP NOTE
Group Topic: Coping Skills   Group Date: 8/28/2024-[facility]  Start Time: 11:00 AM  End Time: 12:00 PM  Facilitators: BLANK Cotto   Department: [facility]    Number of Participants: 7   Treatment Modality: Interpersonal Therapy and Psychoeducation  Interventions utilized were exploration, patient education, and support  Purpose: coping skills, feelings, and insight or knowledge  Comment: Group Counseling    Group began with a meditative reading about making daily choices and pts. were encouraged to reflect and respond. Members were encouraged to provide the group with an update regarding their recovery progress, as well as discuss shared experiences and observations.  Group participation guidelines were reviewed.   Definite shift in her mood over the past several days as she plans to return to work this week. Again reports that she is preparing for an emotional response regarding her father's loss and realizes that she hasn't worked through her grief.   Name: Mary Mayers YOB: 1970   MR: [id number]      Level of Participation: when cued  Quality of Participation: attentive and quiet  Mood/Affect: anxious and depressed  Progress: Minimal  Plan: continue with services

## 2024-08-28 NOTE — GROUP NOTE
Group Topic: Dialectical Behavioral Therapy - Emotional Regulation   Group Date: 8/28/2024 IOP  Start Time: 10:00 AM  End Time: 11:00 AM  Facilitators: BLANK Cotto   [facility]    Number of Participants: 7   Treatment Modality: Dialectical Behavioral Therapy  Interventions utilized were exploration, patient education, and support  Purpose: coping skills, feelings, and insight or knowledge  Comment: Emotional regulation-ACCEPTS/DBT    Goal of this session is to review mindfulness concept and learn DBT skill of radical acceptance as a tool for emotional management.  A brief video was shown, reviewed and discussed on both topics.  A handout was distributed and reviewed on definition, benefits, interfering factors and how to practice radical acceptance.  Information re:: relevance to addiction recovery was provided. Discussion followed and examples provided throughout.   More withdrawn and quiet recently. Still presents with eye contact and listening.   Name: Mary Mayers YOB: 1970   MR: [id number]      Level of Participation: moderate  Quality of Participation: attentive  Mood/Affect: depressed  Progress: Moderate  Plan: continue with services

## 2024-08-29 ENCOUNTER — MULTIDISCIPLINARY VISIT (OUTPATIENT)
Dept: BEHAVIORAL HEALTH | Facility: CLINIC | Age: 54
End: 2024-08-29
Payer: COMMERCIAL

## 2024-08-29 DIAGNOSIS — F10.20 ALCOHOL USE DISORDER, SEVERE (MULTI): ICD-10-CM

## 2024-08-29 PROCEDURE — 90853 GROUP PSYCHOTHERAPY: CPT

## 2024-08-29 NOTE — GROUP NOTE
Group Topic: Chemical Dependency - Primary Disease   Group Date: 8/29/2024 IOP  Start Time: 10:00 AM  End Time: 11:00 AM  Facilitators: BLANK oCtto   [facility]    Number of Participants: 7   Treatment Modality: Other: Education   Interventions utilized were group exercise, patient education, and support  Purpose: coping skills and insight or knowledge  Comment: Signs and Symptoms of Addiction    Goal of this session is to examine signs and symptoms of addiction within the frame work of Step 1 of a 12 step program [powerlessness, loss of control, etc.].  An exercise that is a review of Step 1 was presented by a member who is completing the IOP portion of treatment.  Members are asked to respond to a series of questions where they share their own signs and symptoms of addiction including, preoccupation, protecting supply, powerlessness, use despite consequences, unmanageability, loss of control, etc. Other group members are asked to participate by reading questions, actively listening and identification with the speaker's content.  Each member is asked to give the presenter feedback in the form of identification, shared treatment experience, observation, etc. and regarding their ongoing recovery plan. Today a coin was presented to recognize the completion of the Intensive Outpatient Program for the member.   Appropriately self relating and offering feedback.   Name: Mary Mayers YOB: 1970   MR: [id number]      Level of Participation: moderate  Quality of Participation: engaged  Mood/Affect: depressed  Progress: Moderate  Plan: continue with services

## 2024-08-29 NOTE — GROUP NOTE
Group Topic: Chemical Dependency - Relapse   Group Date: 8/29/2024 IOP  Start Time: 11:00 AM  End Time: 12:00 PM  Facilitators: BLANK Cotto   [facility]    Number of Participants: 7   Treatment Modality: Interpersonal Therapy and Psychoeducation  Interventions utilized were clarification, patient education, problem solving, and support  Purpose: coping skills, insight or knowledge, and relapse prevention strategies  Comment: Group Counseling    Group began with a meditative reading about patience and pts. were encouraged to reflect and respond. Members were encouraged to provide the group with an update regarding their recovery progress, as well as discuss shared experiences and observations.  This is an upcoming extended holiday weekend and thus majority of the time was spent relapse prevention planing.   Reviewed plans to return to work. She is committed to attending meetings, possibly family cookout, but no definitive plans. Will be attending [facility] beginning next Tuesday. Discussed sponsorship. Remains committed to abstinence.  Name: Mary Mayers YOB: 1970   MR: [id number]      Level of Participation: moderate  Quality of Participation: engaged  Mood/Affect: appropriate  Progress: Gaining insight or knowledge  Plan: continue with services

## 2024-08-29 NOTE — GROUP NOTE
Group Topic: Dialectical Behavioral Therapy - Mindfulness   Group Date: 8/29/2024 -[facility]  Start Time:  9:00 AM  End Time: 10:00 AM  Facilitators: BLANK Cotto   [facility]    Number of Participants: 7   Treatment Modality: Dialectical Behavioral Therapy  Interventions utilized were group exercise and patient education  Purpose: coping skills  Comment: Mindfulness and check in  Group began with a meditative reading and pts. were encouraged to reflect and respond.  This was followed by a brief explanation of the potential benefits of mindfulness.  An experiential exercise was completed and afterwards members were asked to share a non-judgmental observation about their experience as well as a daily gratitude. Members shared an update regarding recovery progress.   Participating and self relating.   Name: Mary Mayers YOB: 1970   MR: [id number]      Level of Participation: moderate  Quality of Participation: attentive and engaged  Mood/Affect: appropriate  Progress: Gaining insight or knowledge  Plan: continue with services

## 2024-08-30 LAB — ETHYL GLUCURONIDE UR QL SCN: NEGATIVE NG/ML

## 2024-09-03 ENCOUNTER — MULTIDISCIPLINARY VISIT (OUTPATIENT)
Dept: BEHAVIORAL HEALTH | Facility: CLINIC | Age: 54
End: 2024-09-03
Payer: COMMERCIAL

## 2024-09-03 DIAGNOSIS — F10.20 ALCOHOL USE DISORDER, SEVERE (MULTI): Primary | ICD-10-CM

## 2024-09-03 PROCEDURE — 90853 GROUP PSYCHOTHERAPY: CPT | Performed by: COUNSELOR

## 2024-09-03 PROCEDURE — 80307 DRUG TEST PRSMV CHEM ANLYZR: CPT

## 2024-09-03 NOTE — GROUP NOTE
Group Topic: Chemical Dependency - Primary Disease   Group Date: 9/3/2024  Start Time:  6:00 PM  End Time:  7:00 PM  Facilitators: Mesha Crowe LPC   [facility]    Number of Participants: 4   Treatment Modality: Psychoeducation  Interventions utilized were patient education  Purpose: insight or knowledge  Comment: Group members reviewed written material from [employer] regarding the top 10 myths of addiction/alcoholism.  Reviewed each myth and discussed the reality of substance use disorders.  Good group discussion as several group members related with myths and how attending education sessions as helped to challenge myths and stereotypes that persist.     Name: Mary Mayers YOB: 1970   MR: [id number]      Level of Participation: active  Quality of Participation: cooperative  Mood/Affect: appropriate  Progress: Gaining insight or knowledge  Plan: continue with services

## 2024-09-04 NOTE — GROUP NOTE
Group Topic: Dialectical Behavioral Therapy - Mindfulness   Group Date: 9/3/2024  Start Time:  7:00 PM  End Time:  8:00 PM  Facilitators: BARRY Deleon   Department: [facility]    Number of Participants: 4   Treatment Modality: Dialectical Behavioral Therapy  Interventions utilized were exploration, group exercise, and support  Purpose: feelings, communication skills, insight or knowledge, and trigger / craving management  Comment: Patient participated in a mindfulness meditation exercise and a group check-in.  The purpose and benefits of mindfulness meditation in strengthening relapse prevention skills was explained.  A mindfulness meditation exercise was conducted, and patient provided feedback in the form of personal observations from the exercise.    Name: Mary Mayers YOB: 1970   MR: [id number]      Level of Participation: when cued  Quality of Participation: appropriate/pleasant, attentive, cooperative, and engaged  Mood/Affect: appropriate  Progress: Gaining insight or knowledge  Plan: continue with services     spontaneous

## 2024-09-04 NOTE — GROUP NOTE
Group Topic: Chemical Dependency - Relapse   Group Date: 9/3/2024  Start Time:  8:00 PM  End Time:  9:00 PM  Facilitators: BARRY Deleon   Department: [facility]    Number of Participants: 4   Treatment Modality: Cognitive Behavioral Therapy and Dialectical Behavioral Therapy  Interventions utilized were exploration, reality testing, and support  Purpose: feelings, communication skills, and trigger / craving management  Comment: Group therapy with members of the [facility] level of treatment.  Started the group with a reading from “Body, Mind, and Spirit.”  The reading talked about managing expectations in life and recovery.  Group members shared what they could relate to from today's reading.  Group members then shared recovery updates including any recent and/or current sobriety threats.       Name: Mary Mayers YOB: 1970   MR: [id number]      Level of Participation: active  Quality of Participation: appropriate/pleasant, attentive, cooperative, and engaged  Mood/Affect: appropriate  Progress: Gaining insight or knowledge.  Patient was active and engaged in tonight's group therapy session.  She shared with the group that tomorrow (9/4/24) will be 30 days sober for her, and she will be attending her AA home group to celebrate this milestone.  The group was very supportive of her regarding her ongoing sobriety.    Plan: continue with services

## 2024-09-05 ENCOUNTER — MULTIDISCIPLINARY VISIT (OUTPATIENT)
Dept: BEHAVIORAL HEALTH | Facility: CLINIC | Age: 54
End: 2024-09-05
Payer: COMMERCIAL

## 2024-09-05 ENCOUNTER — DOCUMENTATION (OUTPATIENT)
Dept: BEHAVIORAL HEALTH | Facility: CLINIC | Age: 54
End: 2024-09-05
Payer: COMMERCIAL

## 2024-09-05 DIAGNOSIS — F10.20 ALCOHOL USE DISORDER, SEVERE (MULTI): ICD-10-CM

## 2024-09-05 PROCEDURE — 90853 GROUP PSYCHOTHERAPY: CPT

## 2024-09-05 NOTE — GROUP NOTE
Group Topic: Chemical Dependency - Primary Disease   Group Date: 9/5/2024  Start Time:  7:00 PM  End Time:  8:00 PM  Facilitators: Mesha Crowe LPC   [facility]    Number of Participants: 8   Treatment Modality: Psychodynamic Psychotherapy  Interventions utilized were exploration, problem solving, story telling, and support  Purpose: coping skills, feelings, communication skills, and insight or knowledge  Comment: Group therapy.  Group members are encouraged to take time to share any updates regarding their addiction recovery, including successes and challenges.   Good group discussion on boundaries and saying no.  One group member shares a successful return to work and another is celebrating 30 days.      Name: Mary Mayers YOB: 1970   MR: [id number]      Level of Participation: active  Quality of Participation: attentive  Mood/Affect: appropriate  Progress: Gaining insight or knowledge  Plan: continue with services  Reports continued sobriety.   Supportive and relating to peers.  Notes she recently celebrated 30 days sober.

## 2024-09-05 NOTE — GROUP NOTE
Group Topic: Dialectical Behavioral Therapy - Mindfulness   Group Date: 9/5/2024  Start Time:  6:00 PM  End Time:  7:00 PM  Facilitators: Mesha Crowe LPC; BARRY Deleon   [facility]    Number of Participants: 8   Treatment Modality: Skills Training  Interventions utilized were group exercise  Purpose: maladaptive thinking and feelings  Comment: Beginning of group was focused on introduction to mindfulness exercise. Discussed rational for the practice of mindfulness as well as brief education about some of myths regarding the goals of mindfulness. Allowed time to process the exercise.       Name: Mary Mayers YOB: 1970   MR: [id number]      Level of Participation: active  Quality of Participation: cooperative  Mood/Affect: appropriate  Progress: Gaining insight or knowledge  Plan: continue with services

## 2024-09-05 NOTE — CARE PLAN
Problem: D5 Substance free life: Lifestyle which reinforces maintenance of chemical dependency  Goal: STG Patient will abstain from alcohol and all mind altering substances as evidence by negative uds  Outcome: Progressing  Goal: STG Patient will attend scheduled IOP days  Outcome: Progressing  Goal: LTG Patient will establish a structured recovery peer support program during course of treatment program  Outcome: Progressing  Goal: LTG Patient will develop a written plan for continuing treatment post discharge during last scheduled week of treatment  Outcome: Progressing     Problem: D3 Understanding Mental Health: Psychological distress influencing daily functioning  Goal: D3 Goal To better understand the relationship between substance/alcohol use and mental health  Outcome: Progressing  Goal: D3 Goal Take all medications as prescribed and report and side effects  Outcome: Progressing    Level of Care Assessment:  D1: Acute Intx/Withdrawal Potential: 0  D2: Biomedical Conditions/Complications: 0  D3: Emtional Behavioral/Cog. Conditions Complications: 2  D4: Treatment Acceptance/Resistance: 1  D5: Relapse/Cont. Use/Cont. Problem Potential: 1  D6: Recovery Environment: 1    9/5/2024 Successfully transitioned back to evening IOP as of 9/3/2024 after several weeks in PHP and 5 day IOP during day at [facility].  Is attending meetings regularly however does not have a sponsor.  In group sessions is an active participant.  Has started outside 1:1 counseling.  UDS results are negative which support her self report of abstinence.  Is progressing towards the goals as outlined in her treatment plan.

## 2024-09-06 ENCOUNTER — APPOINTMENT (OUTPATIENT)
Dept: RADIOLOGY | Facility: CLINIC | Age: 54
End: 2024-09-06
Payer: COMMERCIAL

## 2024-09-06 NOTE — GROUP NOTE
Group Topic: Chemical Dependency - Primary Disease   Group Date: 9/5/2024  Start Time:  8:00 PM  End Time:  9:00 PM  Facilitators: BARRY Deleon   Department: [facility]    Number of Participants: 5   Treatment Modality: Psychoeducation  Interventions utilized were patient education  Purpose: insight or knowledge  Comment: Today's education session focused on personally identifying signs and symptomatology of substance use disorder including a loss of control over drinking or drug use and identifying life areas that have been affected by addiction.  One patient who is transitioning from IOP to after care, spoke to the group about ways that these principles have manifested in their own life.  Patient was engaged and attentive.    Name: Mary Mayers YOB: 1970   MR: [id number]      Level of Participation: when cued  Quality of Participation: appropriate/pleasant, attentive, cooperative, and engaged  Mood/Affect: appropriate  Progress: Gaining insight or knowledge  Plan: continue with services.  Patient will continue with IOP services.

## 2024-09-09 ENCOUNTER — MULTIDISCIPLINARY VISIT (OUTPATIENT)
Dept: BEHAVIORAL HEALTH | Facility: CLINIC | Age: 54
End: 2024-09-09
Payer: COMMERCIAL

## 2024-09-09 DIAGNOSIS — F10.20 ALCOHOL USE DISORDER, SEVERE (MULTI): Primary | ICD-10-CM

## 2024-09-09 PROCEDURE — 80346 BENZODIAZEPINES1-12: CPT

## 2024-09-09 PROCEDURE — 80307 DRUG TEST PRSMV CHEM ANLYZR: CPT

## 2024-09-09 PROCEDURE — 90853 GROUP PSYCHOTHERAPY: CPT | Performed by: COUNSELOR

## 2024-09-09 PROCEDURE — 80365 DRUG SCREENING OXYCODONE: CPT

## 2024-09-10 ENCOUNTER — APPOINTMENT (OUTPATIENT)
Dept: BEHAVIORAL HEALTH | Facility: CLINIC | Age: 54
End: 2024-09-10
Payer: COMMERCIAL

## 2024-09-10 NOTE — GROUP NOTE
Group Topic: Chemical Dependency - Primary Disease   Group Date: 9/9/2024  Start Time:  6:00 PM  End Time:  7:00 PM  Facilitators: Mesha Crowe LPC   [facility]    Number of Participants: 5   Treatment Modality: Psychoeducation  Interventions utilized were patient education  Purpose: insight or knowledge  Comment: Today's topic was “Roadmap to Recovery”, a [facility] PowerPoint that outlines stages of recovery and different considerations for each stage. Education on triggers in each stage and how to combat them.     Name: Mary Mayers YOB: 1970   MR: [id number]      Level of Participation: active  Quality of Participation: attentive  Mood/Affect: appropriate  Progress: Gaining insight or knowledge  Plan: continue with services

## 2024-09-11 DIAGNOSIS — F33.0 MILD EPISODE OF RECURRENT MAJOR DEPRESSIVE DISORDER (CMS-HCC): ICD-10-CM

## 2024-09-11 DIAGNOSIS — F41.1 GAD (GENERALIZED ANXIETY DISORDER): ICD-10-CM

## 2024-09-11 LAB — ETHYL GLUCURONIDE UR QL SCN: NEGATIVE NG/ML

## 2024-09-11 RX ORDER — FLUOXETINE HYDROCHLORIDE 20 MG/1
20 CAPSULE ORAL DAILY
Qty: 30 CAPSULE | Refills: 0 | Status: SHIPPED | OUTPATIENT
Start: 2024-09-11 | End: 2024-10-11

## 2024-09-11 NOTE — GROUP NOTE
Group Topic: Dialectical Behavioral Therapy - Mindfulness   Group Date: 9/9/2024  Start Time:  7:00 PM  End Time:  8:00 PM  Facilitators: Mesha Crowe LPC   [facility]    Number of Participants: 5   Treatment Modality: Skills Training  Interventions utilized were group exercise  Purpose: insight or knowledge  Comment: Beginning of group was focused on introduction to mindfulness exercise. Discussed rational for the practice of mindfulness as well as brief education about some of myths regarding the goals of mindfulness. Allowed time to process the exercise.       Name: Mary Mayers YOB: 1970   MR: [id number]      Level of Participation: active  Quality of Participation: attentive  Mood/Affect: appropriate  Progress: Gaining insight or knowledge  Plan: continue with services

## 2024-09-11 NOTE — GROUP NOTE
Group Topic: Chemical Dependency - Primary Disease   Group Date: 9/9/2024  Start Time:  8:00 PM  End Time:  9:00 PM  Facilitators: Mesha Crowe LPC   [facility]    Number of Participants: 5   Treatment Modality: Psychodynamic Psychotherapy  Interventions utilized were exploration, group exercise, patient education, problem solving, reality testing, and support  Purpose: coping skills, feelings, insight or knowledge, and relapse prevention strategies  Comment: Group therapy.  Group members are encouraged to take time to share any updates regarding their addiction recovery, including successes and challenges.  Group members were also encouraged to identify what is working well for them and what the next step in their recovery may be.         Name: Mary Mayers YOB: 1970   MR: [id number]      Level of Participation: active  Quality of Participation: cooperative  Mood/Affect: appropriate  Progress: Gaining insight or knowledge  Plan: continue with services  Reports continued sobriety.   Doing well- attending meetings, fellowshiping, has a homegroup.  Currently looking for sponsor as she notes wanting to begin working the steps.  Supportive to peers via relating and sharing her personal experiences.

## 2024-09-12 ENCOUNTER — MULTIDISCIPLINARY VISIT (OUTPATIENT)
Dept: BEHAVIORAL HEALTH | Facility: CLINIC | Age: 54
End: 2024-09-12
Payer: COMMERCIAL

## 2024-09-12 ENCOUNTER — HOSPITAL ENCOUNTER (OUTPATIENT)
Dept: RADIOLOGY | Facility: CLINIC | Age: 54
Discharge: HOME | End: 2024-09-12
Payer: COMMERCIAL

## 2024-09-12 VITALS — HEIGHT: 62 IN | BODY MASS INDEX: 30.55 KG/M2 | WEIGHT: 166.01 LBS

## 2024-09-12 DIAGNOSIS — F10.20 ALCOHOL USE DISORDER, SEVERE (MULTI): ICD-10-CM

## 2024-09-12 DIAGNOSIS — R92.8 ABNORMAL SCREENING MAMMOGRAM: Primary | ICD-10-CM

## 2024-09-12 DIAGNOSIS — Z12.31 SCREENING MAMMOGRAM, ENCOUNTER FOR: ICD-10-CM

## 2024-09-12 PROCEDURE — 77067 SCR MAMMO BI INCL CAD: CPT | Performed by: RADIOLOGY

## 2024-09-12 PROCEDURE — 77063 BREAST TOMOSYNTHESIS BI: CPT | Performed by: RADIOLOGY

## 2024-09-12 PROCEDURE — 90853 GROUP PSYCHOTHERAPY: CPT

## 2024-09-12 PROCEDURE — 77067 SCR MAMMO BI INCL CAD: CPT

## 2024-09-13 ENCOUNTER — DOCUMENTATION (OUTPATIENT)
Dept: BEHAVIORAL HEALTH | Facility: CLINIC | Age: 54
End: 2024-09-13
Payer: COMMERCIAL

## 2024-09-13 NOTE — GROUP NOTE
Group Topic: Chemical Dependency - Primary Disease   Group Date: 9/12/2024  Start Time:  8:00 PM  End Time:  9:00 PM  Facilitators: BARRY Deleon   Department: [facility]    Number of Participants: 5   Treatment Modality: Psychoeducation  Interventions utilized were patient education  Purpose: insight or knowledge  Comment: 60 minute Education Group with members of the [facility] level of treatment.  All group members listened attentively to a presentation on “Anger Management and Recovery.”  The presentation talked about how negative emotions, including anger, can lead a person to a relapse.  The presentation then described various ways to effectively diffuse our anger and protect our recovery in the process.  The presentation was followed by a discussion on this topic.  All group members shared what they could relate to and also supported their fellow group members.       Name: Mary Mayers YOB: 1970   MR: [id number]      Level of Participation: when cued  Quality of Participation: appropriate/pleasant, attentive, cooperative, and engaged  Mood/Affect: appropriate  Progress: Gaining insight or knowledge  Plan: continue with services.  Patient will continue with IOP services.

## 2024-09-13 NOTE — GROUP NOTE
Group Topic: Chemical Dependency - Primary Disease   Group Date: 9/12/2024  Start Time:  7:00 PM  End Time:  8:00 PM  Facilitators: Mesha Crowe LPC; BARRY Deleon   [facility]    Number of Participants: 8   Treatment Modality: Psychodynamic Psychotherapy  Interventions utilized were exploration, patient education, problem solving, story telling, and support  Purpose: communication skills and insight or knowledge  Comment: Group therapy.  Group members are encouraged to take time to share any updates regarding their addiction recovery, including successes and challenges.   In addition group members are given time to introduce themselves as there are aftercare members and group members who are meeting for the first time.      Name: Mary Mayers YOB: 1970   MR: [id number]      Level of Participation: active  Quality of Participation: cooperative  Mood/Affect: appropriate  Progress: Gaining insight or knowledge  Plan: continue with services  Reports continued sobriety.   Talks about potential to switch jobs noting she recently completed a preliminary interview and is planning a second sometime next week.  Hears support from peers who relative via talking about decision making and pros/cons.

## 2024-09-13 NOTE — CARE PLAN
Problem: D5 Substance free life: Lifestyle which reinforces maintenance of chemical dependency  Goal: STG Patient will abstain from alcohol and all mind altering substances as evidence by negative uds  Outcome: Progressing  Goal: STG Patient will attend scheduled IOP days  Outcome: Progressing  Goal: LTG Patient will establish a structured recovery peer support program during course of treatment program  Outcome: Progressing  Goal: LTG Patient will develop a written plan for continuing treatment post discharge during last scheduled week of treatment  Outcome: Progressing     Problem: D3 Understanding Mental Health: Psychological distress influencing daily functioning  Goal: D3 Goal To better understand the relationship between substance/alcohol use and mental health  Outcome: Progressing  Goal: D3 Goal Take all medications as prescribed and report and side effects  Outcome: Progressing    Level of Care Assessment:  D1: Acute Intx/Withdrawal Potential: 0  D2: Biomedical Conditions/Complications: 0  D3: Emtional Behavioral/Cog. Conditions Complications: 1  D4: Treatment Acceptance/Resistance: 1  D5: Relapse/Cont. Use/Cont. Problem Potential: 1  D6: Recovery Environment: 0    9/13/2024 Client is progressing towards the goals as outlined in her treatment plan.  In group is an active member.  Tentative plan to present first step Monday 9/16 and transition to aftercare as of 9/19.  UDS results have been negative which support her self reports of continued sobriety.

## 2024-09-13 NOTE — GROUP NOTE
Group Topic: Dialectical Behavioral Therapy - Mindfulness   Group Date: 9/12/2024  Start Time:  6:00 PM  End Time:  7:00 PM  Facilitators: Mesha Crowe LPC; BARRY Deleon   [facility]    Number of Participants: 8   Treatment Modality: Skills Training  Interventions utilized were group exercise  Purpose: coping skills, maladaptive thinking, and insight or knowledge  Comment: Beginning of group was focused on introduction to mindfulness exercise. Discussed rational for the practice of mindfulness as well as brief education about some of myths regarding the goals of mindfulness. Allowed time to process the exercise.       Name: Mary Mayers YOB: 1970   MR: [id number]      Level of Participation: active  Quality of Participation: cooperative  Mood/Affect: appropriate  Progress: Gaining insight or knowledge  Plan: continue with services

## 2024-09-16 ENCOUNTER — MULTIDISCIPLINARY VISIT (OUTPATIENT)
Dept: BEHAVIORAL HEALTH | Facility: CLINIC | Age: 54
End: 2024-09-16
Payer: COMMERCIAL

## 2024-09-16 DIAGNOSIS — F10.20 ALCOHOL USE DISORDER, SEVERE: Primary | ICD-10-CM

## 2024-09-16 PROCEDURE — 90853 GROUP PSYCHOTHERAPY: CPT | Performed by: COUNSELOR

## 2024-09-17 NOTE — GROUP NOTE
Group Topic: Dialectical Behavioral Therapy - Mindfulness   Group Date: 9/16/2024  Start Time:  7:00 PM  End Time:  8:00 PM  Facilitators: Mesha Crowe LPC   [facility]    Number of Participants: 4   Treatment Modality: Skills Training  Interventions utilized were group exercise  Purpose: coping skills, maladaptive thinking, and insight or knowledge  Comment: Beginning of group was focused on introduction to mindfulness exercise. Discussed rational for the practice of mindfulness as well as brief education about some of myths regarding the goals of mindfulness. Allowed time to process the exercise.       Name: Mary Mayers YOB: 1970   MR: [id number]      Level of Participation: active  Quality of Participation: cooperative  Mood/Affect: appropriate  Progress: Gaining insight or knowledge  Plan: continue with services

## 2024-09-17 NOTE — GROUP NOTE
Group Topic: Chemical Dependency - Primary Disease   Group Date: 9/16/2024  Start Time:  6:00 PM  End Time:  7:00 PM  Facilitators: Mesha Crowe LPC   [facility]    Number of Participants: 4   Treatment Modality: Psychoeducation  Interventions utilized were group exercise  Purpose: insight or knowledge  Comment: Today's education session focused on identifying signs and symptomatology of substance use disorder personally, including a sense of loss of control over drinking or drug use and identifying life areas that have been affected by addiction.  One patient who is transitioning from IOP to after care, spoke to the group about ways that these principles have manifested in their own life.     Name: Mary Mayers YOB: 1970   MR: [id number]      Level of Participation: active  Quality of Participation: engaged  Mood/Affect: appropriate  Progress: Gaining insight or knowledge  Plan: continue with services  Presents first step to peers.

## 2024-09-18 NOTE — GROUP NOTE
Group Topic: Chemical Dependency - Primary Disease   Group Date: 9/16/2024  Start Time:  8:00 PM  End Time:  9:00 PM  Facilitators: Mesha Crowe LPC   [facility]    Number of Participants: 4   Treatment Modality: Psychodynamic Psychotherapy  Interventions utilized were exploration, patient education, problem solving, and support  Purpose: coping skills and insight or knowledge  Comment: Group therapy.  Group members are encouraged to take time to share any updates regarding their addiction recovery, including successes and challenges.   One group member takes time to share challenges he faced over the weekend.      Name: Mary Mayers YOB: 1970   MR: [id number]      Level of Participation: active  Quality of Participation: attentive  Mood/Affect: appropriate  Progress: Gaining insight or knowledge  Plan: continue with services  Active in offering support and relating to peers.  Having increased cravings to drink possibly triggered by transition to aftercare as well as return to work.  Continues to look for a sponsor as she notes she is eager to begin working the steps.

## 2024-09-19 ENCOUNTER — MULTIDISCIPLINARY VISIT (OUTPATIENT)
Dept: BEHAVIORAL HEALTH | Facility: CLINIC | Age: 54
End: 2024-09-19
Payer: COMMERCIAL

## 2024-09-19 DIAGNOSIS — F10.20 ALCOHOL USE DISORDER, SEVERE, DEPENDENCE (MULTI): ICD-10-CM

## 2024-09-19 PROCEDURE — 90853 GROUP PSYCHOTHERAPY: CPT | Performed by: COUNSELOR

## 2024-09-20 ENCOUNTER — DOCUMENTATION (OUTPATIENT)
Dept: BEHAVIORAL HEALTH | Facility: CLINIC | Age: 54
End: 2024-09-20
Payer: COMMERCIAL

## 2024-09-20 NOTE — GROUP NOTE
Group Topic: Chemical Dependency - Primary Disease   Group Date: 9/19/2024  Start Time:  6:00 PM  End Time:  7:30 PM  Facilitators: Mesha Crowe LPC; BARRY Deleon   [facility]    Number of Participants: 9   Treatment Modality: Psychodynamic Psychotherapy and Skills Training  Interventions utilized were clarification, exploration, group exercise, problem solving, story telling, and support  Purpose: coping skills, feelings, communication skills, insight or knowledge, and relapse prevention strategies  Comment: Beginning of group was focused on introduction to mindfulness exercise. Discussed rational for the practice of mindfulness as well as brief education about some of myths regarding the goals of mindfulness. Allowed time to process the exercise.   Used remainder of time to allow group members to share any updates regarding their addiction recovery, including successes and challenges.   One group member take time to share update- 60 days sober and planning to return to work.  Another takes time to share thoughts about how she may handle her birthday coming up this weekend.  Other group members offer support and feedback.    Name: Mary Mayers YOB: 1970   MR: [id number]      Level of Participation: active  Quality of Participation: cooperative  Mood/Affect: appropriate  Progress: Gaining insight or knowledge  Plan: continue with services  Talks about upcoming birthday Sunday.  Notes that she is thinking of plans however hears encouragement to make plans concrete especially for increased accountability.  Sad when discussing first birthday without her father.

## 2024-09-26 ENCOUNTER — MULTIDISCIPLINARY VISIT (OUTPATIENT)
Dept: BEHAVIORAL HEALTH | Facility: CLINIC | Age: 54
End: 2024-09-26
Payer: COMMERCIAL

## 2024-09-26 DIAGNOSIS — F10.20 ALCOHOL USE DISORDER, SEVERE, DEPENDENCE (MULTI): ICD-10-CM

## 2024-09-26 PROCEDURE — 90853 GROUP PSYCHOTHERAPY: CPT | Performed by: COUNSELOR

## 2024-09-27 NOTE — GROUP NOTE
Group Topic: Chemical Dependency - Primary Disease   Group Date: 9/26/2024  Start Time:  6:00 PM  End Time:  7:30 PM  Facilitators: Mesha Crowe LPC; BARRY Deleon   [facility]    Number of Participants: 9   Treatment Modality: Psychodynamic Psychotherapy and Skills Training  Interventions utilized were clarification, exploration, group exercise, patient education, problem solving, and support  Purpose: coping skills, feelings, communication skills, insight or knowledge, and relapse prevention strategies  Comment: Beginning of group was focused on introduction to mindfulness exercise. Discussed rational for the practice of mindfulness as well as brief education about some of myths regarding the goals of mindfulness. Allowed time to process the exercise.   Used remainder of time to encouraged group members to share any updates regarding their addiction recovery, including successes and challenges.       Name: Mary Mayers YOB: 1970   MR: [id number]      Client is present and engaged.  Overall doing well.  Reports continued sobriety.  Updates group regarding recent birthday celebration that she was concerned could present some challenges.  Made it through birthday successfully.  Takes time to process recent text from her sister regarding setting ridged boundaries that effects client and her ability to spend time with her mother.  Hears validation from peers regarding annoyance and tone of email however encourages thinking about the pros and cons of responding as client admits belief that responding will lead to increased tension.

## 2024-10-01 DIAGNOSIS — R92.8 ABNORMAL MAMMOGRAM: Primary | ICD-10-CM

## 2024-10-03 ENCOUNTER — APPOINTMENT (OUTPATIENT)
Dept: RADIOLOGY | Facility: CLINIC | Age: 54
End: 2024-10-03
Payer: COMMERCIAL

## 2024-10-03 ENCOUNTER — MULTIDISCIPLINARY VISIT (OUTPATIENT)
Dept: BEHAVIORAL HEALTH | Facility: CLINIC | Age: 54
End: 2024-10-03
Payer: COMMERCIAL

## 2024-10-03 DIAGNOSIS — F10.20 ALCOHOL USE DISORDER, SEVERE, DEPENDENCE (MULTI): ICD-10-CM

## 2024-10-03 PROCEDURE — 80359 METHYLENEDIOXYAMPHETAMINES: CPT | Performed by: NURSE PRACTITIONER

## 2024-10-03 PROCEDURE — 90853 GROUP PSYCHOTHERAPY: CPT | Performed by: COUNSELOR

## 2024-10-03 PROCEDURE — 80307 DRUG TEST PRSMV CHEM ANLYZR: CPT | Performed by: NURSE PRACTITIONER

## 2024-10-03 PROCEDURE — 80365 DRUG SCREENING OXYCODONE: CPT | Performed by: NURSE PRACTITIONER

## 2024-10-03 PROCEDURE — 80346 BENZODIAZEPINES1-12: CPT | Performed by: NURSE PRACTITIONER

## 2024-10-04 LAB
AMPHETAMINES UR QL SCN: ABNORMAL
BARBITURATES UR QL SCN: ABNORMAL
BENZODIAZ UR QL SCN: ABNORMAL
BZE UR QL SCN: ABNORMAL
CANNABINOIDS UR QL SCN: ABNORMAL
FENTANYL+NORFENTANYL UR QL SCN: ABNORMAL
METHADONE UR QL SCN: ABNORMAL
OPIATES UR QL SCN: ABNORMAL
OXYCODONE+OXYMORPHONE UR QL SCN: ABNORMAL
PCP UR QL SCN: ABNORMAL

## 2024-10-04 NOTE — GROUP NOTE
Group Topic: Chemical Dependency - Primary Disease   Group Date: 10/3/2024  Start Time:  6:00 PM  End Time:  7:30 PM  Facilitators: Mesha Crowe LPC; BARRY Deleon   [facility]    Number of Participants: 6   Treatment Modality: Psychodynamic Psychotherapy and Skills Training  Interventions utilized were exploration, group exercise, problem solving, story telling, and support  Purpose: coping skills, feelings, insight or knowledge, and relapse prevention strategies  Comment: Beginning of group was focused on introduction to mindfulness exercise. Discussed rational for the practice of mindfulness as well as brief education about some of myths regarding the goals of mindfulness. Allowed time to process the exercise.   Used remainder of the group time to check-in with group members regarding progress in their addiction recovery. Many group members take time to share celebratory milestones and experiences in AA meetings.  One group member takes time to process relapse with alcohol.        Name: Mary Mayers YOB: 1970   MR: [id number]      Level of Participation: active  Quality of Participation: cooperative  Mood/Affect: appropriate  Progress: Gaining insight or knowledge  Plan: continue with services  Currently 59 days sober and talks about celebrating 60 days tomorrow.  Supportive of peers and offers good encouragement regarding attending AA meetings via her own personal experience.  Still no sponsor however talks about a recent encounter with a sober support who volunteers to help client begin working the steps.

## 2024-10-05 LAB — ETHYL GLUCURONIDE UR QL SCN: NEGATIVE NG/ML

## 2024-10-07 LAB
AMPHET UR-MCNC: <50 NG/ML
MDA UR-MCNC: <200 NG/ML
MDEA UR-MCNC: <200 NG/ML
MDMA UR-MCNC: <200 NG/ML
METHAMPHET UR-MCNC: <200 NG/ML
PHENTERMINE UR CFM-MCNC: <200 NG/ML

## 2024-10-10 ENCOUNTER — MULTIDISCIPLINARY VISIT (OUTPATIENT)
Dept: BEHAVIORAL HEALTH | Facility: CLINIC | Age: 54
End: 2024-10-10
Payer: COMMERCIAL

## 2024-10-10 DIAGNOSIS — F10.20 ALCOHOL USE DISORDER, SEVERE, DEPENDENCE (MULTI): Primary | ICD-10-CM

## 2024-10-10 PROCEDURE — 90853 GROUP PSYCHOTHERAPY: CPT | Performed by: COUNSELOR

## 2024-10-11 NOTE — GROUP NOTE
"Group Topic: Chemical Dependency - Primary Disease   Group Date: 10/10/2024  Start Time:  6:00 PM  End Time:  7:30 PM  Facilitators: Mesha Crowe LPC; BARRY Deleon   [facility]    Number of Participants: 6   Treatment Modality: Psychodynamic Psychotherapy and Skills Training  Interventions utilized were exploration, group exercise, patient education, and support  Purpose: coping skills, maladaptive thinking, feelings, communication skills, self-care, and relapse prevention strategies  Comment: Beginning of group was focused on introduction to mindfulness exercise. Discussed rational for the practice of mindfulness as well as brief education about some of myths regarding the goals of mindfulness. Allowed time to process the exercise.  Group used time for introductions as there is a new group member starting this pm.  Used remainder of time to allow group members time to check in and share updates regarding their addiction recovery.  Good group discussion and sharing around AA meeting attendance, sponsorship, and fellowship.    Name: Mary Mayers YOB: 1970   MR: [id number]      Level of Participation: active  Quality of Participation: engaged  Mood/Affect: appropriate  Progress: Gaining insight or knowledge  Plan: continue with services  Active in sharing how she remains involved in meetings and how she stays connected during online meetings- noting \"you get out what you put in.\"  Overall doing well and staying sober.  Offers good support and information to peers regarding a variety of AA meetings.  Continuing to search for sponsor however talks about getting active in meetings including bringing snacks and fellowshiping.        "

## 2024-10-17 ENCOUNTER — HOSPITAL ENCOUNTER (OUTPATIENT)
Dept: RADIOLOGY | Facility: CLINIC | Age: 54
Discharge: HOME | End: 2024-10-17
Payer: COMMERCIAL

## 2024-10-17 ENCOUNTER — MULTIDISCIPLINARY VISIT (OUTPATIENT)
Dept: BEHAVIORAL HEALTH | Facility: CLINIC | Age: 54
End: 2024-10-17
Payer: COMMERCIAL

## 2024-10-17 DIAGNOSIS — R92.8 ABNORMAL MAMMOGRAM: ICD-10-CM

## 2024-10-17 DIAGNOSIS — F10.20 ALCOHOL USE DISORDER, SEVERE, DEPENDENCE (MULTI): ICD-10-CM

## 2024-10-17 PROCEDURE — 77061 BREAST TOMOSYNTHESIS UNI: CPT | Mod: LT

## 2024-10-17 PROCEDURE — 90853 GROUP PSYCHOTHERAPY: CPT | Performed by: COUNSELOR

## 2024-10-18 DIAGNOSIS — R92.8 ABNORMAL MAMMOGRAM: Primary | ICD-10-CM

## 2024-10-18 NOTE — GROUP NOTE
Group Topic: Chemical Dependency - Primary Disease   Group Date: 10/17/2024  Start Time:  6:00 PM  End Time:  7:30 PM  Facilitators: Mesha Crowe LPC; BARRY Deleon   Department: [facility]    Number of Participants: 7   Treatment Modality: Psychodynamic Psychotherapy and Skills Training  Interventions utilized were exploration, group exercise, patient education, and support  Purpose: coping skills, maladaptive thinking, feelings, communication skills, and insight or knowledge  Comment: Beginning of group was focused on introduction to mindfulness exercise. Discussed rational for the practice of mindfulness as well as brief education about some of myths regarding the goals of mindfulness. Allowed time to process the exercise.   Group therapy.  Group members are encouraged to take time to share any updates regarding their addiction recovery, including successes and challenges.  Good group discussion regarding working a recovery program.  One group member takes time to process a resentment.         Name: Mary Mayers YOB: 1970   MR: [id number]      Level of Participation: active  Quality of Participation: attentive  Mood/Affect: appropriate  Progress: Gaining insight or knowledge  Plan: continue with services  Reports continued sobriety.   Actively searching for sponsor- asked someone recently however advised she does not sponsor.  Remains engaged in meetings and recently applied to be host of online meeting.  Hears good support from peers regarding the recovery work she is putting in.

## 2024-10-31 ENCOUNTER — MULTIDISCIPLINARY VISIT (OUTPATIENT)
Dept: BEHAVIORAL HEALTH | Facility: CLINIC | Age: 54
End: 2024-10-31
Payer: COMMERCIAL

## 2024-10-31 DIAGNOSIS — F10.20 ALCOHOL USE DISORDER, SEVERE, DEPENDENCE (MULTI): ICD-10-CM

## 2024-10-31 PROCEDURE — 90853 GROUP PSYCHOTHERAPY: CPT | Performed by: COUNSELOR

## 2024-11-07 ENCOUNTER — MULTIDISCIPLINARY VISIT (OUTPATIENT)
Dept: BEHAVIORAL HEALTH | Facility: CLINIC | Age: 54
End: 2024-11-07
Payer: COMMERCIAL

## 2024-11-07 DIAGNOSIS — F10.20 ALCOHOL USE DISORDER, SEVERE, DEPENDENCE (MULTI): ICD-10-CM

## 2024-11-07 PROCEDURE — 90853 GROUP PSYCHOTHERAPY: CPT | Performed by: COUNSELOR

## 2024-11-08 NOTE — GROUP NOTE
Group Topic: Chemical Dependency - Primary Disease   Group Date: 11/7/2024  Start Time:  6:00 PM  End Time:  7:30 PM  Facilitators: Mesha Crowe LPC; BARRY Deleon   [facility]    Number of Participants: 9   Treatment Modality: Psychodynamic Psychotherapy and Skills Training  Interventions utilized were exploration, group exercise, patient education, and support  Purpose: insight or knowledge  Comment: Beginning of group was focused on introduction to mindfulness exercise. Discussed rational for the practice of mindfulness as well as brief education about some of myths regarding the goals of mindfulness. Allowed time to process the exercise.   Used remainder of time to allow group members to take time to share any updates regarding their addiction recovery, including successes and challenges.       Name: Mary Mayers YOB: 1970   MR: [id number]      Level of Participation: active  Quality of Participation: engaged  Mood/Affect: appropriate  Progress: Gaining insight or knowledge  Plan: continue with services  Client is active- recently completed goal of 90 meetings in 90 days.  Co-hosting online meeting.  Active in offering support and feedback to peers regarding her experience in early recovery.

## 2024-11-09 DIAGNOSIS — F33.0 MILD EPISODE OF RECURRENT MAJOR DEPRESSIVE DISORDER (CMS-HCC): ICD-10-CM

## 2024-11-09 DIAGNOSIS — F41.1 GAD (GENERALIZED ANXIETY DISORDER): ICD-10-CM

## 2024-11-11 RX ORDER — FLUOXETINE HYDROCHLORIDE 20 MG/1
20 CAPSULE ORAL DAILY
Qty: 30 CAPSULE | Refills: 0 | Status: SHIPPED | OUTPATIENT
Start: 2024-11-11

## 2024-11-14 ENCOUNTER — MULTIDISCIPLINARY VISIT (OUTPATIENT)
Dept: BEHAVIORAL HEALTH | Facility: CLINIC | Age: 54
End: 2024-11-14
Payer: COMMERCIAL

## 2024-11-14 DIAGNOSIS — F10.20 ALCOHOL USE DISORDER, SEVERE, DEPENDENCE (MULTI): ICD-10-CM

## 2024-11-14 PROCEDURE — 90853 GROUP PSYCHOTHERAPY: CPT | Performed by: COUNSELOR

## 2024-11-15 NOTE — GROUP NOTE
Group Topic: Chemical Dependency - Primary Disease   Group Date: 11/14/2024  Start Time:  6:00 PM  End Time:  7:30 PM  Facilitators: Mesha Crowe LPC; BARRY Deleon   [facility]    Number of Participants: 5   Treatment Modality: Psychodynamic Psychotherapy and Skills Training  Interventions utilized were exploration, group exercise, and support  Purpose: coping skills, feelings, communication skills, insight or knowledge, and relapse prevention strategies  Comment: Beginning of group was focused on introduction to mindfulness exercise. Discussed rational for the practice of mindfulness as well as brief education about some of myths regarding the goals of mindfulness. Allowed time to process the exercise.   Used reminder of time to allow group members time to check in regarding their addiction recovery, introductions, and a reading.      Name: Mary Mayers YOB: 1970   MR: [id number]      Level of Participation: active  Quality of Participation: engaged  Mood/Affect: appropriate  Progress: Gaining insight or knowledge  Plan: continue with services  Reports continued sobriety.   Doing well- co-hosting meeting for second week.  Continuing to attend meetings and offering support and feedback to peers regarding 12-step meetings.  Notes how she is better able to handle situations and people now that she is sober.

## 2024-11-21 ENCOUNTER — MULTIDISCIPLINARY VISIT (OUTPATIENT)
Dept: BEHAVIORAL HEALTH | Facility: CLINIC | Age: 54
End: 2024-11-21
Payer: COMMERCIAL

## 2024-11-21 DIAGNOSIS — F10.20 ALCOHOL USE DISORDER, SEVERE, DEPENDENCE (MULTI): ICD-10-CM

## 2024-11-21 PROCEDURE — 90853 GROUP PSYCHOTHERAPY: CPT | Performed by: COUNSELOR

## 2024-11-22 NOTE — GROUP NOTE
"Group Topic: Chemical Dependency - Primary Disease   Group Date: 11/21/2024  Start Time:  6:00 PM  End Time:  7:30 PM  Facilitators: Mesha Crowe LPC; BARRY Deleon   [facility]    Number of Participants: 6   Treatment Modality: Psychodynamic Psychotherapy and Skills Training  Interventions utilized were exploration, group exercise, problem solving, and support  Purpose: coping skills, feelings, communication skills, and relapse prevention strategies  Comment: Beginning of group was focused on introduction to mindfulness exercise. Discussed rational for the practice of mindfulness as well as brief education about some of myths regarding the goals of mindfulness. Allowed time to process the exercise.   Used reminder of session to allow group members time to check in and share any updates regarding their addiction recovery.  Spent time exploring holiday plans and strategies to stay sober through the holidays.      Name: Mary Mayers YOB: 1970   MR: [id number]      Level of Participation: active  Quality of Participation: cooperative  Mood/Affect: appropriate  Progress: Gaining insight or knowledge  Plan: continue with services  Reports continued sobriety.  Talks about upcoming holiday of \"firsts\" including being sober and the first holiday without her father.  Thinking of how to move forward in her recovery as to avoid being stagnant and notes importance of getting busy working the steps.    "

## 2024-12-03 ENCOUNTER — APPOINTMENT (OUTPATIENT)
Dept: INTEGRATIVE MEDICINE | Facility: CLINIC | Age: 54
End: 2024-12-03
Payer: COMMERCIAL

## 2024-12-16 ENCOUNTER — PATIENT MESSAGE (OUTPATIENT)
Dept: PRIMARY CARE | Facility: CLINIC | Age: 54
End: 2024-12-16
Payer: COMMERCIAL

## 2024-12-16 DIAGNOSIS — F41.1 GAD (GENERALIZED ANXIETY DISORDER): ICD-10-CM

## 2024-12-16 DIAGNOSIS — F33.0 MILD EPISODE OF RECURRENT MAJOR DEPRESSIVE DISORDER (CMS-HCC): ICD-10-CM

## 2024-12-16 RX ORDER — HYDROXYZINE HYDROCHLORIDE 50 MG/1
50 TABLET, FILM COATED ORAL EVERY 8 HOURS PRN
Qty: 30 TABLET | Refills: 0 | Status: SHIPPED | OUTPATIENT
Start: 2024-12-16 | End: 2025-12-16

## 2024-12-16 RX ORDER — FLUOXETINE HYDROCHLORIDE 20 MG/1
20 CAPSULE ORAL DAILY
Qty: 30 CAPSULE | Refills: 3 | Status: SHIPPED | OUTPATIENT
Start: 2024-12-16 | End: 2024-12-20 | Stop reason: SDUPTHER

## 2024-12-19 ENCOUNTER — APPOINTMENT (OUTPATIENT)
Dept: BEHAVIORAL HEALTH | Facility: CLINIC | Age: 54
End: 2024-12-19
Payer: COMMERCIAL

## 2024-12-20 ENCOUNTER — PATIENT MESSAGE (OUTPATIENT)
Dept: PRIMARY CARE | Facility: CLINIC | Age: 54
End: 2024-12-20
Payer: COMMERCIAL

## 2024-12-20 DIAGNOSIS — F41.1 GAD (GENERALIZED ANXIETY DISORDER): ICD-10-CM

## 2024-12-20 DIAGNOSIS — F33.0 MILD EPISODE OF RECURRENT MAJOR DEPRESSIVE DISORDER (CMS-HCC): ICD-10-CM

## 2024-12-20 RX ORDER — FLUOXETINE HYDROCHLORIDE 20 MG/1
20 CAPSULE ORAL DAILY
Qty: 30 CAPSULE | Refills: 3 | Status: SHIPPED | OUTPATIENT
Start: 2024-12-20

## 2025-02-19 ENCOUNTER — APPOINTMENT (OUTPATIENT)
Dept: PRIMARY CARE | Facility: CLINIC | Age: 55
End: 2025-02-19
Payer: COMMERCIAL

## 2025-02-19 VITALS
OXYGEN SATURATION: 95 % | HEART RATE: 87 BPM | TEMPERATURE: 97 F | SYSTOLIC BLOOD PRESSURE: 124 MMHG | BODY MASS INDEX: 33.1 KG/M2 | WEIGHT: 181 LBS | DIASTOLIC BLOOD PRESSURE: 76 MMHG

## 2025-02-19 DIAGNOSIS — F33.0 MILD EPISODE OF RECURRENT MAJOR DEPRESSIVE DISORDER (CMS-HCC): ICD-10-CM

## 2025-02-19 DIAGNOSIS — F41.1 GAD (GENERALIZED ANXIETY DISORDER): ICD-10-CM

## 2025-02-19 DIAGNOSIS — Z00.00 ANNUAL PHYSICAL EXAM: Primary | ICD-10-CM

## 2025-02-19 PROCEDURE — 3078F DIAST BP <80 MM HG: CPT | Performed by: NURSE PRACTITIONER

## 2025-02-19 PROCEDURE — 3074F SYST BP LT 130 MM HG: CPT | Performed by: NURSE PRACTITIONER

## 2025-02-19 PROCEDURE — 99214 OFFICE O/P EST MOD 30 MIN: CPT | Performed by: NURSE PRACTITIONER

## 2025-02-19 RX ORDER — FLUOXETINE HYDROCHLORIDE 20 MG/1
20 CAPSULE ORAL DAILY
Qty: 90 CAPSULE | Refills: 3 | Status: SHIPPED | OUTPATIENT
Start: 2025-02-19 | End: 2026-02-19

## 2025-02-19 RX ORDER — FLUOXETINE 10 MG/1
10 CAPSULE ORAL DAILY
Qty: 90 CAPSULE | Refills: 3 | Status: SHIPPED | OUTPATIENT
Start: 2025-02-19 | End: 2026-02-19

## 2025-02-19 ASSESSMENT — ENCOUNTER SYMPTOMS
DIZZINESS: 0
EYES NEGATIVE: 1
CARDIOVASCULAR NEGATIVE: 1
MUSCULOSKELETAL NEGATIVE: 1
ENDOCRINE NEGATIVE: 1
CONFUSION: 0
HEMATOLOGIC/LYMPHATIC NEGATIVE: 1
NEUROLOGICAL NEGATIVE: 1
GASTROINTESTINAL NEGATIVE: 1
SLEEP DISTURBANCE: 1
LIGHT-HEADEDNESS: 0
CONSTITUTIONAL NEGATIVE: 1
RESPIRATORY NEGATIVE: 1
ALLERGIC/IMMUNOLOGIC NEGATIVE: 1
NERVOUS/ANXIOUS: 0
POLYPHAGIA: 0
WEAKNESS: 0
FACIAL ASYMMETRY: 0
WOUND: 0

## 2025-02-19 ASSESSMENT — PATIENT HEALTH QUESTIONNAIRE - PHQ9
1. LITTLE INTEREST OR PLEASURE IN DOING THINGS: SEVERAL DAYS
2. FEELING DOWN, DEPRESSED OR HOPELESS: SEVERAL DAYS
SUM OF ALL RESPONSES TO PHQ9 QUESTIONS 1 AND 2: 2

## 2025-02-19 NOTE — LETTER
February 19, 2025     Patient: Mary Mayers   YOB: 1970   Date of Visit: 2/19/2025       To Whom It May Concern:    Mary Mayers was seen in my clinic on 2/19/2025 at 2:30 pm. Please excuse Mary for her absence from work on this day to make the appointment.    If you have any questions or concerns, please don't hesitate to call.         Sincerely,         Karina Salgado, APRN-CNP        CC: No Recipients

## 2025-02-19 NOTE — PATIENT INSTRUCTIONS
Please complete labs fasting     Please have  cognitive  behavior  call or fax papers to discuss FMLA papers     Please colonoscopy

## 2025-02-19 NOTE — PROGRESS NOTES
Subjective   Patient ID: Mary Mayers is a 54 y.o. female who presents for Follow-up (Weight loss med, [facility] for mental health, ).    HPI Japonica is here for follow up visit.  She is requesting [facility] paperwork for her intermittent mental health issues.  However we discussed that she needs to provide proof from her cognitive behavior therapist and how well she is doing and that she is attending her sessions.  She denies that she is no longer drinking alcohol.  She also is interested in weight loss injections.  She needs to complete her yearly labs as she does have a significant history of alcohol abuse.  She does need to complete a colonoscopy as her mother has a history of colon cancer young age.  We will increase her Prozac to 30 mg daily I provided prescriptions for this.  No other issues noted.    Review of Systems   Constitutional: Negative.    HENT: Negative.     Eyes: Negative.    Respiratory: Negative.     Cardiovascular: Negative.    Gastrointestinal: Negative.    Endocrine: Negative.  Negative for polyphagia.   Genitourinary: Negative.    Musculoskeletal: Negative.    Skin: Negative.  Negative for pallor, rash and wound.   Allergic/Immunologic: Negative.    Neurological: Negative.  Negative for dizziness, facial asymmetry, weakness and light-headedness.   Hematological: Negative.    Psychiatric/Behavioral:  Positive for sleep disturbance. Negative for confusion. The patient is not nervous/anxious.    All other systems reviewed and are negative.      Objective   /76   Pulse 87   Temp 36.1 °C (97 °F)   Wt 82.1 kg (181 lb)   SpO2 95%   BMI 33.10 kg/m²     Physical Exam  Vitals and nursing note reviewed.   Constitutional:       Appearance: Normal appearance. She is normal weight.   HENT:      Head: Normocephalic.      Nose: Nose normal.      Mouth/Throat:      Mouth: Mucous membranes are moist.   Eyes:      Extraocular Movements: Extraocular movements intact.      Conjunctiva/sclera: Conjunctivae  normal.      Pupils: Pupils are equal, round, and reactive to light.   Cardiovascular:      Rate and Rhythm: Normal rate and regular rhythm.      Pulses: Normal pulses.      Heart sounds: Normal heart sounds.   Pulmonary:      Effort: Pulmonary effort is normal.   Abdominal:      General: Abdomen is flat. Bowel sounds are normal.      Palpations: Abdomen is soft.   Musculoskeletal:         General: Normal range of motion.      Cervical back: Normal range of motion.   Skin:     General: Skin is warm and dry.   Neurological:      General: No focal deficit present.      Mental Status: She is alert and oriented to person, place, and time.   Psychiatric:         Mood and Affect: Mood normal.         Behavior: Behavior normal.         Assessment/Plan   1. Mild episode of recurrent major depressive disorder (CMS-HCC)  - FLUoxetine (PROzac) 20 mg capsule; Take 1 capsule (20 mg) by mouth once daily.  Dispense: 90 capsule; Refill: 3  - FLUoxetine (PROzac) 10 mg capsule; Take 1 capsule (10 mg) by mouth once daily.  Dispense: 90 capsule; Refill: 3    2. DAMION (generalized anxiety disorder)    - FLUoxetine (PROzac) 20 mg capsule; Take 1 capsule (20 mg) by mouth once daily.  Dispense: 90 capsule; Refill: 3  - FLUoxetine (PROzac) 10 mg capsule; Take 1 capsule (10 mg) by mouth once daily.  Dispense: 90 capsule; Refill: 3    3. Annual physical exam (Primary)  - Hemoglobin A1C; Future  - CBC; Future  - Comprehensive Metabolic Panel; Future  - Lipid Panel; Future  - TSH with reflex to Free T4 if abnormal; Future  - Triiodothyronine, Total; Future  - Hemoglobin A1C  - CBC  - Comprehensive Metabolic Panel  - Lipid Panel  - TSH with reflex to Free T4 if abnormal  - Triiodothyronine, Total

## 2025-02-20 ENCOUNTER — APPOINTMENT (OUTPATIENT)
Dept: DERMATOLOGY | Facility: CLINIC | Age: 55
End: 2025-02-20
Payer: COMMERCIAL

## 2025-02-20 DIAGNOSIS — L20.9 ATOPIC DERMATITIS, UNSPECIFIED TYPE: Primary | ICD-10-CM

## 2025-02-20 PROCEDURE — 99204 OFFICE O/P NEW MOD 45 MIN: CPT | Performed by: DERMATOLOGY

## 2025-02-20 RX ORDER — TRIAMCINOLONE ACETONIDE 1 MG/G
CREAM TOPICAL 2 TIMES DAILY
Qty: 15 G | Refills: 3 | Status: CANCELLED | OUTPATIENT
Start: 2025-02-20 | End: 2025-03-06

## 2025-02-20 RX ORDER — CLOBETASOL PROPIONATE 0.5 MG/G
CREAM TOPICAL
Qty: 60 G | Refills: 3 | Status: SHIPPED | OUTPATIENT
Start: 2025-02-20

## 2025-02-20 NOTE — PROGRESS NOTES
Maribell Mayers is a 54 y.o. female who presents for the following: Dermatitis (Follow up - [facility] 01/25/24 (Xiao Barragan PA-C) atopic dermatitis - Patient states she has has improved while using the Triamcinolone.  Area(s) of concern: anterior right leg, left/right arms, left/right forearms.).     Review of Systems:  No other skin or systemic complaints other than what is documented elsewhere in the note.    The following portions of the chart were reviewed this encounter and updated as appropriate:          Skin Cancer History  No skin cancer on file.      Specialty Problems          Dermatology Problems    Excess skin of abdomen    Laxity of skin        Objective   Well appearing patient in no apparent distress; mood and affect are within normal limits.    A focused skin examination was performed of bilateral upper extremities, right leg. All findings within normal limits unless otherwise noted below.    Assessment/Plan   1. Atopic dermatitis, unspecified type  Right Lower Leg - Anterior  Hyperpigmented and lichenified plaque on the right anterior leg  Bilateral upper extremities with erythematous scaly patches   Dryness noted of skin    The chronic and intermittently flaring nature of this skin condition was discussed with the patient today. Advise that this occurs due to a genetic defect in the skin barrier, is common in children, can persist into adolescence and adulthood, however some children may outgrow this skin condition. Atopic dermatitis treatment goal is to restore the skin barrier. Exacerbations may be due to a variety of causes. The itching associated with atopic dermatitis can interfere with sleep and quality of life.  We discussed a gentle skin care regimen including washing with a mild soap without fragrance such as dove for sensitive skin, cetaphil or cerave. Pat dry after washing and then apply a thick moisturizer such as Cerave cream or cetaphil cream.     Discontinue  triamcinolone 0.1% cream.    Start clobetasol 0.05% cream Patient to apply 2x daily x 2 wks then decrease to 2x/day 2 days per week. Can repeat full 2 week course as often as every 6-8 weeks as needed for flaring. Side effects of topical steroids includes, but is not limited to skin atrophy and dyspigmentation.    Follows up if fails to improve or worsens despite treatment.     clobetasol (Temovate) 0.05 % cream - Right Lower Leg - Anterior  apply to the upper extremities, right leg 2x daily x 2 wks then decrease to 2x/day 2 days per week. Can repeat full 2 week course as often as every 6-8 weeks as needed for flaring

## 2025-02-22 LAB
ALBUMIN SERPL-MCNC: 4.3 G/DL (ref 3.6–5.1)
ALP SERPL-CCNC: 95 U/L (ref 37–153)
ALT SERPL-CCNC: 12 U/L (ref 6–29)
ANION GAP SERPL CALCULATED.4IONS-SCNC: 13 MMOL/L (CALC) (ref 7–17)
AST SERPL-CCNC: 17 U/L (ref 10–35)
BILIRUB SERPL-MCNC: 0.5 MG/DL (ref 0.2–1.2)
BUN SERPL-MCNC: 14 MG/DL (ref 7–25)
CALCIUM SERPL-MCNC: 9.7 MG/DL (ref 8.6–10.4)
CHLORIDE SERPL-SCNC: 105 MMOL/L (ref 98–110)
CHOLEST SERPL-MCNC: 229 MG/DL
CHOLEST/HDLC SERPL: 3 (CALC)
CO2 SERPL-SCNC: 22 MMOL/L (ref 20–32)
CREAT SERPL-MCNC: 0.68 MG/DL (ref 0.5–1.03)
EGFRCR SERPLBLD CKD-EPI 2021: 103 ML/MIN/1.73M2
ERYTHROCYTE [DISTWIDTH] IN BLOOD BY AUTOMATED COUNT: 11.9 % (ref 11–15)
EST. AVERAGE GLUCOSE BLD GHB EST-MCNC: 120 MG/DL
EST. AVERAGE GLUCOSE BLD GHB EST-SCNC: 6.6 MMOL/L
GLUCOSE SERPL-MCNC: 81 MG/DL (ref 65–99)
HBA1C MFR BLD: 5.8 % OF TOTAL HGB
HCT VFR BLD AUTO: 40 % (ref 35–45)
HDLC SERPL-MCNC: 77 MG/DL
HGB BLD-MCNC: 12.8 G/DL (ref 11.7–15.5)
LDLC SERPL CALC-MCNC: 130 MG/DL (CALC)
MCH RBC QN AUTO: 29.6 PG (ref 27–33)
MCHC RBC AUTO-ENTMCNC: 32 G/DL (ref 32–36)
MCV RBC AUTO: 92.4 FL (ref 80–100)
NONHDLC SERPL-MCNC: 152 MG/DL (CALC)
PLATELET # BLD AUTO: 435 THOUSAND/UL (ref 140–400)
PMV BLD REES-ECKER: 10.6 FL (ref 7.5–12.5)
POTASSIUM SERPL-SCNC: 4.7 MMOL/L (ref 3.5–5.3)
PROT SERPL-MCNC: 7.4 G/DL (ref 6.1–8.1)
RBC # BLD AUTO: 4.33 MILLION/UL (ref 3.8–5.1)
SODIUM SERPL-SCNC: 140 MMOL/L (ref 135–146)
T3 SERPL-MCNC: 94 NG/DL (ref 76–181)
TRIGL SERPL-MCNC: 109 MG/DL
TSH SERPL-ACNC: 0.83 MIU/L
WBC # BLD AUTO: 6.6 THOUSAND/UL (ref 3.8–10.8)

## 2025-02-27 ENCOUNTER — PATIENT MESSAGE (OUTPATIENT)
Dept: PRIMARY CARE | Facility: CLINIC | Age: 55
End: 2025-02-27
Payer: COMMERCIAL

## 2025-02-27 DIAGNOSIS — E78.2 MIXED HYPERLIPIDEMIA: ICD-10-CM

## 2025-02-27 DIAGNOSIS — R73.03 PREDIABETES: ICD-10-CM

## 2025-03-04 ENCOUNTER — PATIENT MESSAGE (OUTPATIENT)
Dept: PRIMARY CARE | Facility: CLINIC | Age: 55
End: 2025-03-04
Payer: COMMERCIAL

## 2025-04-22 ENCOUNTER — OFFICE VISIT (OUTPATIENT)
Dept: PODIATRY | Facility: CLINIC | Age: 55
End: 2025-04-22
Payer: COMMERCIAL

## 2025-04-22 DIAGNOSIS — M79.671 RIGHT FOOT PAIN: Primary | ICD-10-CM

## 2025-04-22 DIAGNOSIS — M72.2 PLANTAR FASCIITIS: ICD-10-CM

## 2025-04-22 DIAGNOSIS — Q66.72 PES CAVUS OF BOTH FEET: ICD-10-CM

## 2025-04-22 DIAGNOSIS — Q66.71 PES CAVUS OF BOTH FEET: ICD-10-CM

## 2025-04-22 DIAGNOSIS — R26.89 ANTALGIC GAIT: ICD-10-CM

## 2025-04-22 DIAGNOSIS — M79.672 LEFT FOOT PAIN: ICD-10-CM

## 2025-04-22 PROCEDURE — 20605 DRAIN/INJ JOINT/BURSA W/O US: CPT | Performed by: PODIATRIST

## 2025-04-22 PROCEDURE — 99214 OFFICE O/P EST MOD 30 MIN: CPT | Mod: 25 | Performed by: PODIATRIST

## 2025-04-22 PROCEDURE — 20605 DRAIN/INJ JOINT/BURSA W/O US: CPT | Mod: 50 | Performed by: PODIATRIST

## 2025-04-22 PROCEDURE — 99204 OFFICE O/P NEW MOD 45 MIN: CPT | Performed by: PODIATRIST

## 2025-04-22 PROCEDURE — 2500000004 HC RX 250 GENERAL PHARMACY W/ HCPCS (ALT 636 FOR OP/ED): Mod: JZ | Performed by: PODIATRIST

## 2025-04-22 RX ORDER — TRIAMCINOLONE ACETONIDE 40 MG/ML
40 INJECTION, SUSPENSION INTRA-ARTICULAR; INTRAMUSCULAR ONCE
Status: COMPLETED | OUTPATIENT
Start: 2025-04-22 | End: 2025-04-22

## 2025-04-22 RX ADMIN — TRIAMCINOLONE ACETONIDE 40 MG: 40 INJECTION, SUSPENSION INTRA-ARTICULAR; INTRAMUSCULAR at 16:30

## 2025-04-22 ASSESSMENT — ENCOUNTER SYMPTOMS
CONSTITUTIONAL NEGATIVE: 1
ALLERGIC/IMMUNOLOGIC NEGATIVE: 1
GASTROINTESTINAL NEGATIVE: 1
ENDOCRINE NEGATIVE: 1
EYES NEGATIVE: 1
RESPIRATORY NEGATIVE: 1
HEMATOLOGIC/LYMPHATIC NEGATIVE: 1
CARDIOVASCULAR NEGATIVE: 1

## 2025-04-22 NOTE — PROGRESS NOTES
Chief complaint bilateral foot pain right greater than left.  Sometimes pain can be a 10 out of 10.  Points to the heel and the arch of the foot.  Progressive pain over the past 6 months.  She admits to post static dyskinesia.  States that she recently went to [employer] feet store and had some inserts given to her.  She has recently started wearing these though she is not too sure if they are helping much at all.  Her feet have been quite painful at night after wearing these.  In addition there is pain in the mid arch of the foot when she wears them.  The pain was not that intense previously.  No trauma to either foot  Past medical history includes depressive disorder, general anxiety disorder.  Works as a nurse at [employer].  Medications and allergies reviewed  Non-smoker.    Review of Systems   Constitutional: Negative.    HENT: Negative.     Eyes: Negative.    Respiratory: Negative.     Cardiovascular: Negative.    Gastrointestinal: Negative.    Endocrine: Negative.    Genitourinary: Negative.    Musculoskeletal:  Positive for gait problem.   Skin: Negative.    Allergic/Immunologic: Negative.    Hematological: Negative.    Psychiatric/Behavioral:          Anxiety       General/Constitutional: Alert. NAD.   Respiratory: Non labored breathing.   Psychiatric: Mood and affect normal/baseline.   HEENT: Sclera clear. + Corrective lenses.   Dermatologic: Skin and nails intact.  No acute inflammatory infectious process. Web spaces are dry. No ulcers no pre-ulcerative areas.  Vascular: Pedal pulses are intact and symmetric including the dorsalis pedis and the posterior tibial pulses. Feet are warm to touch. No swelling appreciated either extremity.  Neurological: Alert and oriented. No acute distress. No sensory impairment bilateral.  Musculoskeletal: Strength is normal for age. No acute deficits appreciated.  Both feet with well-formed moderately cavus arch.  Pain mid arch of both feet.  Pain  palpation medial calcaneal tuberosities right greater than left.  There is no insertional Achilles pain appreciated.  No gross deformities noted.  No pain with hindfoot range of motion.  No ankle joint pain noted.  No subtalar joint pain no sinus tarsi pain.    Impression: Bilateral plantar fasciitis secondary to pes cavus deformity.    -Today's treatment and course of therapy was discussed with the patient in detail. Patient's questions were answered. Proper foot care was discussed. This dictation was done using Dragon computer software and as such may contain grammatical errors.    -Bilateral injection with Kenalog 20 mg per side.  This was well-tolerated.    -I did discuss use of ibuprofen over-the-counter take this at home regularly x 7 to 10 days.  Then as needed.  Follow label instructions.    -Discussed appropriate stretching exercises, proper shoe gear, avoid walking barefoot excessively.    -I am not sure that the over-the-counter inserts are going to be to your advantage you might want to consider returning them and should you need them in the future he can go back to them.  I would not want you to be stuck with an expensive device that you cannot wear.    -We can also consider physical therapy.    - X-rays as needed.    -Patient was seen in conjunction with the resident who gave the injections (Aldo Xavier DPM PGY1)    - Labs 2/21/2025 reviewed noncontributory to today's course of treatment.    - PCP note reviewed.  No pertinent findings except as noted above.

## 2025-05-01 ENCOUNTER — APPOINTMENT (OUTPATIENT)
Dept: OBSTETRICS AND GYNECOLOGY | Facility: CLINIC | Age: 55
End: 2025-05-01
Payer: COMMERCIAL

## 2025-05-06 ENCOUNTER — APPOINTMENT (OUTPATIENT)
Dept: AUDIOLOGY | Facility: CLINIC | Age: 55
End: 2025-05-06
Payer: COMMERCIAL

## 2025-05-06 ENCOUNTER — APPOINTMENT (OUTPATIENT)
Dept: OTOLARYNGOLOGY | Facility: CLINIC | Age: 55
End: 2025-05-06
Payer: COMMERCIAL

## 2025-05-06 VITALS — BODY MASS INDEX: 33.31 KG/M2 | HEIGHT: 62 IN | WEIGHT: 181 LBS

## 2025-05-06 DIAGNOSIS — H81.12 BENIGN POSITIONAL VERTIGO, LEFT: ICD-10-CM

## 2025-05-06 DIAGNOSIS — H69.92 DYSFUNCTION OF LEFT EUSTACHIAN TUBE: ICD-10-CM

## 2025-05-06 DIAGNOSIS — H90.3 SENSORINEURAL HEARING LOSS (SNHL) OF BOTH EARS: ICD-10-CM

## 2025-05-06 DIAGNOSIS — H90.6 MIXED CONDUCTIVE AND SENSORINEURAL HEARING LOSS OF BOTH EARS: ICD-10-CM

## 2025-05-06 DIAGNOSIS — R42 DIZZINESS: ICD-10-CM

## 2025-05-06 DIAGNOSIS — H69.93 DYSFUNCTION OF BOTH EUSTACHIAN TUBES: ICD-10-CM

## 2025-05-06 DIAGNOSIS — H90.6 MIXED CONDUCTIVE AND SENSORINEURAL HEARING LOSS OF BOTH EARS: Primary | ICD-10-CM

## 2025-05-06 DIAGNOSIS — R42 DIZZINESS: Primary | ICD-10-CM

## 2025-05-06 DIAGNOSIS — J30.89 NON-SEASONAL ALLERGIC RHINITIS DUE TO OTHER ALLERGIC TRIGGER: ICD-10-CM

## 2025-05-06 PROCEDURE — 92567 TYMPANOMETRY: CPT

## 2025-05-06 PROCEDURE — 3008F BODY MASS INDEX DOCD: CPT | Performed by: OTOLARYNGOLOGY

## 2025-05-06 PROCEDURE — 92557 COMPREHENSIVE HEARING TEST: CPT

## 2025-05-06 PROCEDURE — 95992 CANALITH REPOSITIONING PROC: CPT | Performed by: OTOLARYNGOLOGY

## 2025-05-06 PROCEDURE — 99204 OFFICE O/P NEW MOD 45 MIN: CPT | Performed by: OTOLARYNGOLOGY

## 2025-05-06 RX ORDER — AZELASTINE 1 MG/ML
2 SPRAY, METERED NASAL 2 TIMES DAILY
Qty: 90 ML | Refills: 0 | Status: SHIPPED | OUTPATIENT
Start: 2025-05-06

## 2025-05-06 RX ORDER — FLUTICASONE PROPIONATE 50 MCG
2 SPRAY, SUSPENSION (ML) NASAL DAILY
Qty: 48 ML | Refills: 0 | Status: SHIPPED | OUTPATIENT
Start: 2025-05-06

## 2025-05-06 ASSESSMENT — ENCOUNTER SYMPTOMS: DIZZINESS: 1

## 2025-05-06 NOTE — PROGRESS NOTES
Subjective   Patient ID: Mary Mayers is a 54 y.o. female  HPI  Patient is complaining of a chronic history of hearing loss.  She is concerned about some deterioration of the hearing in the last few months.  She is also complaining of a chronic history of intermittent dizziness with head movement.  The dizziness seems to occur when she places her head down in bed on the left side.  She has no otalgia and no otorrhea and no tinnitus.  She has no fevers or chills or nausea or vomiting.    Review of Systems   HENT:  Positive for hearing loss.    Neurological:  Positive for dizziness.       Objective   Physical Exam  The following elements of a detailed head and neck exam were performed: General appearance, the skin of the head and neck, inspection of the external ears and ear canal the tympanic membranes, inspection of the mobility of the tympanic membranes, the appearance of the external nose, the nasal mucosa and septum and nasal turbinates, the lips, the teeth, the gums, the tongue, the oral mucosa and the palate, inspection of the tonsils and the posterior pharyngeal wall, indirect mirror laryngoscopy and inspection of the hypopharynx, palpation of the lymph nodes in the neck, and palpation of the thyroid, palpation of the salivary glands, cranial nerve exam including cranial nerves II, III, IV, V, VI, and VII, and cranial nerves IX, X, XI, and XII, and the subjective evaluation of the voice, the inspection of the neck for the presence of respiratory retractions, and the presence or absence of stridor.    There is clinical evidence of hearing loss noted and she is using hearing aids.  There is no spontaneous nystagmus noted.  Finger-to-nose and tandem gait and Romberg are negative.  There is nasal edema and the turbinates are pale.  The tympanic membranes are slightly retracted and they are moving with a Valsalva maneuver.  The remainder of her exam was within normal limits.  The Manuel-Hallpike maneuver was  performed on the left side and was noted to be positive with latency and fatigue.  The Epley maneuver was subsequently performed and she was noted to have an immediate response.  An audiogram and tympanogram were ordered and obtained and the results were reviewed today and revealed bilateral mixed hearing loss with negative pressure in the middle ears.  The previous hearing test from 2020 was reviewed and she was noted to have a similar hearing loss.    Canalith Repositioning for BPPV    Date/Time: 5/6/2025 2:59 PM    Performed by: Emperatriz Booker MD  Authorized by: Emperatriz Booker MD    Consent:     Consent obtained:  Verbal  Indications:     Indications:  Benign positional vertigo      Assessment/Plan   Diagnoses and all orders for this visit:  Dizziness (Primary)  Benign positional vertigo, left  -     Canalith Repositioning for BPPV  Sensorineural hearing loss (SNHL) of both ears  -     Tympanometry Only; Future  -     Comprehensive hearing test; Future  Mixed conductive and sensorineural hearing loss of both ears  -     CT internal auditory canals posterior fossa wo IV contrast; Future  Dysfunction of both eustachian tubes  Non-seasonal allergic rhinitis due to other allergic trigger  -     azelastine (Astelin) 137 mcg (0.1 %) nasal spray; Administer 2 sprays into each nostril 2 times a day.  -     fluticasone (Flonase) 50 mcg/actuation nasal spray; Administer 2 sprays into each nostril once daily. Shake gently. Before first use, prime pump. After use, clean tip and replace cap.     1.  Chronic intermittent positional dizziness consistent clinically with benign positional vertigo on the left side that appears to have responded to the Epley maneuver today.  Further evaluation and VNG testing will be considered if her dizziness continues.  2.  Chronic allergic rhinitis leading to bilateral eustachian tube dysfunction.  The patient was started on Flonase and Astelin and advised to perform the Valsalva maneuver on a  daily basis.  3.  Chronic bilateral mixed hearing loss consistent clinically with otosclerosis.  The patient was scheduled for a CT scan of the temporal bones to rule out middle ear lesions.  She will follow-up in 1 month.

## 2025-05-06 NOTE — PROGRESS NOTES
AUDIOLOGIC EVALUATION    Name:  Mary Mayers  :    1970  Age:     54 y.o.    HISTORY:     Patient was seen for initial hearing test in conjunction with medical follow-up for bilateral hearing loss and dizziness. Recall, most recent hearing test on file performed at [facility] on 2020 revealed right ear hearing moderately severe rising to moderate mixed hearing loss, left ear hearing sensitivity mild rising to within normal limits. Previous hearing test on file from 04/15/2015 performed at the [facility] revealed right ear hearing Moderate rising to mild mixed hearing loss (NOTE: presence of Carhart's notch at 2000 Hz), left ear mild gradually rising to within normal limits. Slight air-bone gap at 500 Hz.     Today, she reported she feels her hearing has gotten worse in both ears she uses [employer] pure [employer] hearing aids which she purchased from [employer]. Endorsed spinning sensation which last for couple seconds.    Denied tinnitus, otalgia, aural pressure/fullness, recent ear infections, and otorrhea.     EVALUATION:    Please see Audiogram.    RESULTS:    Otoscopy:  Right: Clear canal, normal color and appearance of tympanic membrane.  Left: Clear canal, normal color and appearance of tympanic membrane.    Tympanometry:  Right: Normal tympanic membrane mobility and middle ear pressure.  Left: Normal tympanic membrane mobility with negative middle ear pressure (-115 daPa).    Hearing Sensitivity:  Right: Moderately severe rising to moderate mixed (conductive and sensorineural) hearing loss through 8000 Hz.  Left: Moderate rising to mild mixed (conductive and sensorineural) hearing loss through 8000 Hz.    Word Recognition Score (NU-6 half list):  Right: Excellent (100%) at 85 dBHL which is above average speech level (55-60 dBHL).  Left: Excellent (92%) at 80 dBHL which is above average speech level (55-60 dBHL).    IMPRESSIONS:    Compared to previous hearing evaluation  on 07/17/2020, noted 10-20 dB decrease in the right ear at 4273-2291 Hz and in the left ear at 250 Hz and at 8000 Hz.     ASSESSMENT AND PLAN:    - Continue medical follow-up with Dr. Booker.  - Consider further evaluation/monitoring of bilateral mixed hearing loss.  - Pending medical clearance, consider hearing needs assessment to discuss management of hearing loss.  - Continue use of amplification and follow-up as recommended for hearing aid maintenance and adjustments.  - Monitor and recheck hearing as warranted.  - Counseled regarding results and recommendations.      Constantin Buckley.  Clinical Audiologist.

## 2025-05-14 ENCOUNTER — OFFICE VISIT (OUTPATIENT)
Dept: PODIATRY | Facility: CLINIC | Age: 55
End: 2025-05-14
Payer: COMMERCIAL

## 2025-05-14 DIAGNOSIS — M79.671 RIGHT FOOT PAIN: ICD-10-CM

## 2025-05-14 DIAGNOSIS — Q66.72 PES CAVUS OF BOTH FEET: ICD-10-CM

## 2025-05-14 DIAGNOSIS — Q66.71 PES CAVUS OF BOTH FEET: ICD-10-CM

## 2025-05-14 DIAGNOSIS — R26.89 ANTALGIC GAIT: ICD-10-CM

## 2025-05-14 DIAGNOSIS — M72.2 PLANTAR FASCIITIS: Primary | ICD-10-CM

## 2025-05-14 DIAGNOSIS — M79.672 LEFT FOOT PAIN: ICD-10-CM

## 2025-05-14 PROCEDURE — 2500000004 HC RX 250 GENERAL PHARMACY W/ HCPCS (ALT 636 FOR OP/ED): Mod: JZ | Performed by: PODIATRIST

## 2025-05-14 PROCEDURE — 20605 DRAIN/INJ JOINT/BURSA W/O US: CPT | Performed by: PODIATRIST

## 2025-05-14 PROCEDURE — 99213 OFFICE O/P EST LOW 20 MIN: CPT | Mod: 25 | Performed by: PODIATRIST

## 2025-05-14 PROCEDURE — 99213 OFFICE O/P EST LOW 20 MIN: CPT | Performed by: PODIATRIST

## 2025-05-14 PROCEDURE — 20605 DRAIN/INJ JOINT/BURSA W/O US: CPT | Mod: 50 | Performed by: PODIATRIST

## 2025-05-14 RX ORDER — TRIAMCINOLONE ACETONIDE 40 MG/ML
40 INJECTION, SUSPENSION INTRA-ARTICULAR; INTRAMUSCULAR ONCE
Status: COMPLETED | OUTPATIENT
Start: 2025-05-14 | End: 2025-05-14

## 2025-05-14 RX ADMIN — TRIAMCINOLONE ACETONIDE 40 MG: 40 INJECTION, SUSPENSION INTRA-ARTICULAR; INTRAMUSCULAR at 16:19

## 2025-05-14 ASSESSMENT — ENCOUNTER SYMPTOMS
GASTROINTESTINAL NEGATIVE: 1
ALLERGIC/IMMUNOLOGIC NEGATIVE: 1
ENDOCRINE NEGATIVE: 1
EYES NEGATIVE: 1
CARDIOVASCULAR NEGATIVE: 1
CONSTITUTIONAL NEGATIVE: 1
RESPIRATORY NEGATIVE: 1
HEMATOLOGIC/LYMPHATIC NEGATIVE: 1

## 2025-05-14 NOTE — PROGRESS NOTES
Chief Complaint   Patient presents with    Foot Pain     F/U PLANTAR FASCITIS   Follow-up bilateral heel pain.  She done very well since the last injections.  Overall good improvement noted.  No post attic dyskinesia.  Pain now about a 3 out of 10.  Much improved from prior.  She has been stretching.  Wearing good shoes.  Works as a nurse at [employer].  Medications and allergies reviewed  Non-smoker.    Review of Systems   Constitutional: Negative.    HENT: Negative.     Eyes: Negative.    Respiratory: Negative.     Cardiovascular: Negative.    Gastrointestinal: Negative.    Endocrine: Negative.    Genitourinary: Negative.    Musculoskeletal:  Positive for gait problem.   Skin: Negative.    Allergic/Immunologic: Negative.    Hematological: Negative.    Psychiatric/Behavioral:          Anxiety       General/Constitutional: Alert. NAD.   Respiratory: Non labored breathing.   Psychiatric: Mood and affect normal/baseline.   HEENT: Sclera clear. + Corrective lenses.   Dermatologic: Skin and nails intact.  No acute inflammatory infectious process. Web spaces are dry. No ulcers no pre-ulcerative areas.  Vascular: Pedal pulses are intact and symmetric including the dorsalis pedis and the posterior tibial pulses. Feet are warm to touch. No swelling appreciated either extremity.  Neurological: Alert and oriented. No acute distress. No sensory impairment bilateral.  Musculoskeletal: Strength is normal for age. No acute deficits appreciated.  Mild pain on palpation bilateral heels medial calcaneal tuberosity.  Negative Tinel's negative swelling no ecchymosis.  There is no insertional Achilles pain appreciated.  No gross deformities noted.  No pain with hindfoot range of motion.  No ankle joint pain noted.  No subtalar joint pain no sinus tarsi pain.    Impression: Improved bilateral plantar fasciitis secondary to pes cavus deformity.    -Today's treatment and course of therapy was discussed with the  patient in detail. Patient's questions were answered. Proper foot care was discussed. This dictation was done using Dragon computer software and as such may contain grammatical errors.    -Bilateral injection with Kenalog 15 mg per side.  This was well-tolerated.    -I did discuss use of ibuprofen over-the-counter take this at home regularly x 7 to 10 days.  Then as needed.  Follow label instructions.    -Discussed appropriate stretching exercises, proper shoe gear, avoid walking barefoot excessively.    -No new pertinent notes or labs to review

## 2025-05-16 ENCOUNTER — HOSPITAL ENCOUNTER (OUTPATIENT)
Dept: RADIOLOGY | Facility: CLINIC | Age: 55
Discharge: HOME | End: 2025-05-16
Payer: COMMERCIAL

## 2025-05-16 DIAGNOSIS — H90.6 MIXED CONDUCTIVE AND SENSORINEURAL HEARING LOSS OF BOTH EARS: ICD-10-CM

## 2025-05-16 PROCEDURE — 70480 CT ORBIT/EAR/FOSSA W/O DYE: CPT

## 2025-05-19 ENCOUNTER — APPOINTMENT (OUTPATIENT)
Dept: PRIMARY CARE | Facility: CLINIC | Age: 55
End: 2025-05-19
Payer: COMMERCIAL

## 2025-06-04 ENCOUNTER — APPOINTMENT (OUTPATIENT)
Dept: PODIATRY | Facility: CLINIC | Age: 55
End: 2025-06-04
Payer: COMMERCIAL

## 2025-06-07 ENCOUNTER — APPOINTMENT (OUTPATIENT)
Dept: OTOLARYNGOLOGY | Facility: CLINIC | Age: 55
End: 2025-06-07
Payer: COMMERCIAL

## 2025-06-28 ENCOUNTER — APPOINTMENT (OUTPATIENT)
Dept: OTOLARYNGOLOGY | Facility: CLINIC | Age: 55
End: 2025-06-28
Payer: COMMERCIAL

## 2025-08-08 ENCOUNTER — OFFICE VISIT (OUTPATIENT)
Dept: OBSTETRICS AND GYNECOLOGY | Facility: CLINIC | Age: 55
End: 2025-08-08
Payer: COMMERCIAL

## 2025-08-08 VITALS
DIASTOLIC BLOOD PRESSURE: 82 MMHG | WEIGHT: 174 LBS | BODY MASS INDEX: 32.02 KG/M2 | HEIGHT: 62 IN | SYSTOLIC BLOOD PRESSURE: 132 MMHG

## 2025-08-08 DIAGNOSIS — Z11.3 ROUTINE SCREENING FOR STI (SEXUALLY TRANSMITTED INFECTION): ICD-10-CM

## 2025-08-08 DIAGNOSIS — B37.31 YEAST VAGINITIS: ICD-10-CM

## 2025-08-08 DIAGNOSIS — N89.8 VAGINAL IRRITATION: Primary | ICD-10-CM

## 2025-08-08 PROCEDURE — 3008F BODY MASS INDEX DOCD: CPT

## 2025-08-08 PROCEDURE — 3079F DIAST BP 80-89 MM HG: CPT

## 2025-08-08 PROCEDURE — 99214 OFFICE O/P EST MOD 30 MIN: CPT

## 2025-08-08 PROCEDURE — 3075F SYST BP GE 130 - 139MM HG: CPT

## 2025-08-08 RX ORDER — FLUCONAZOLE 150 MG/1
150 TABLET ORAL ONCE
Qty: 1 TABLET | Refills: 0 | Status: SHIPPED | OUTPATIENT
Start: 2025-08-08 | End: 2025-08-08

## 2025-08-08 NOTE — PROGRESS NOTES
Tried over the counter monistat  Got better then came back   Used some of the leftover cream on the outside of the labia  No discomfort with urination   
no

## 2025-08-09 DIAGNOSIS — B96.89 BACTERIAL VAGINOSIS: Primary | ICD-10-CM

## 2025-08-09 DIAGNOSIS — N76.0 BACTERIAL VAGINOSIS: Primary | ICD-10-CM

## 2025-08-09 LAB
BV SCORE VAG QL: ABNORMAL
C TRACH RRNA SPEC QL NAA+PROBE: NOT DETECTED
N GONORRHOEA RRNA SPEC QL NAA+PROBE: NOT DETECTED
QUEST GC CT AMPLIFIED (ALWAYS MESSAGE): NORMAL
T VAGINALIS RRNA SPEC QL NAA+PROBE: NOT DETECTED

## 2025-08-09 RX ORDER — METRONIDAZOLE 500 MG/1
500 TABLET ORAL 2 TIMES DAILY
Qty: 14 TABLET | Refills: 0 | Status: SHIPPED | OUTPATIENT
Start: 2025-08-09 | End: 2025-08-16